# Patient Record
Sex: FEMALE | Race: WHITE | NOT HISPANIC OR LATINO | Employment: FULL TIME | URBAN - METROPOLITAN AREA
[De-identification: names, ages, dates, MRNs, and addresses within clinical notes are randomized per-mention and may not be internally consistent; named-entity substitution may affect disease eponyms.]

---

## 2020-03-16 ENCOUNTER — APPOINTMENT (OUTPATIENT)
Dept: GENERAL RADIOLOGY | Facility: HOSPITAL | Age: 50
End: 2020-03-16

## 2020-03-16 ENCOUNTER — HOSPITAL ENCOUNTER (INPATIENT)
Facility: HOSPITAL | Age: 50
LOS: 1 days | Discharge: HOME | End: 2020-03-18
Attending: EMERGENCY MEDICINE | Admitting: INTERNAL MEDICINE

## 2020-03-16 DIAGNOSIS — E87.6 HYPOKALEMIA: ICD-10-CM

## 2020-03-16 DIAGNOSIS — I48.91 ATRIAL FIBRILLATION WITH RVR (HCC): ICD-10-CM

## 2020-03-16 DIAGNOSIS — I48.91 ATRIAL FIBRILLATION WITH RVR (HCC): Primary | ICD-10-CM

## 2020-03-16 PROBLEM — E03.9 HYPOTHYROIDISM: Status: ACTIVE | Noted: 2020-03-16

## 2020-03-16 PROBLEM — C44.91 BASAL CELL CARCINOMA: Status: ACTIVE | Noted: 2020-03-16

## 2020-03-16 PROBLEM — D25.9 UTERINE LEIOMYOMA: Status: ACTIVE | Noted: 2020-03-16

## 2020-03-16 LAB
ALBUMIN SERPL-MCNC: 4.7 G/DL (ref 3.5–5.2)
ALBUMIN/GLOB SERPL: 1.7 G/DL
ALP SERPL-CCNC: 63 U/L (ref 39–117)
ALT SERPL W P-5'-P-CCNC: 11 U/L (ref 1–33)
ANION GAP SERPL CALCULATED.3IONS-SCNC: 17 MMOL/L (ref 5–15)
AST SERPL-CCNC: 15 U/L (ref 1–32)
BASOPHILS # BLD AUTO: 0.08 10*3/MM3 (ref 0–0.2)
BASOPHILS NFR BLD AUTO: 0.5 % (ref 0–1.5)
BILIRUB SERPL-MCNC: 0.4 MG/DL (ref 0.2–1.2)
BUN BLD-MCNC: 12 MG/DL (ref 6–20)
BUN/CREAT SERPL: 13 (ref 7–25)
CALCIUM SPEC-SCNC: 9.3 MG/DL (ref 8.6–10.5)
CHLORIDE SERPL-SCNC: 100 MMOL/L (ref 98–107)
CO2 SERPL-SCNC: 22 MMOL/L (ref 22–29)
CREAT BLD-MCNC: 0.92 MG/DL (ref 0.57–1)
DEPRECATED RDW RBC AUTO: 38.4 FL (ref 37–54)
EOSINOPHIL # BLD AUTO: 0.25 10*3/MM3 (ref 0–0.4)
EOSINOPHIL NFR BLD AUTO: 1.7 % (ref 0.3–6.2)
ERYTHROCYTE [DISTWIDTH] IN BLOOD BY AUTOMATED COUNT: 12.2 % (ref 12.3–15.4)
GFR SERPL CREATININE-BSD FRML MDRD: 65 ML/MIN/1.73
GLOBULIN UR ELPH-MCNC: 2.8 GM/DL
GLUCOSE BLD-MCNC: 155 MG/DL (ref 65–99)
HCT VFR BLD AUTO: 41.7 % (ref 34–46.6)
HGB BLD-MCNC: 13.8 G/DL (ref 12–15.9)
HOLD SPECIMEN: NORMAL
HOLD SPECIMEN: NORMAL
IMM GRANULOCYTES # BLD AUTO: 0.03 10*3/MM3 (ref 0–0.05)
IMM GRANULOCYTES NFR BLD AUTO: 0.2 % (ref 0–0.5)
LYMPHOCYTES # BLD AUTO: 6.16 10*3/MM3 (ref 0.7–3.1)
LYMPHOCYTES NFR BLD AUTO: 41.5 % (ref 19.6–45.3)
MAGNESIUM SERPL-MCNC: 2 MG/DL (ref 1.6–2.6)
MCH RBC QN AUTO: 28.5 PG (ref 26.6–33)
MCHC RBC AUTO-ENTMCNC: 33.1 G/DL (ref 31.5–35.7)
MCV RBC AUTO: 86 FL (ref 79–97)
MONOCYTES # BLD AUTO: 1.03 10*3/MM3 (ref 0.1–0.9)
MONOCYTES NFR BLD AUTO: 6.9 % (ref 5–12)
NEUTROPHILS # BLD AUTO: 7.28 10*3/MM3 (ref 1.7–7)
NEUTROPHILS NFR BLD AUTO: 49.2 % (ref 42.7–76)
NRBC BLD AUTO-RTO: 0 /100 WBC (ref 0–0.2)
NT-PROBNP SERPL-MCNC: 247.2 PG/ML (ref 5–450)
PLATELET # BLD AUTO: 334 10*3/MM3 (ref 140–450)
PMV BLD AUTO: 10.9 FL (ref 6–12)
POTASSIUM BLD-SCNC: 2.9 MMOL/L (ref 3.5–5.2)
PROT SERPL-MCNC: 7.5 G/DL (ref 6–8.5)
RBC # BLD AUTO: 4.85 10*6/MM3 (ref 3.77–5.28)
SODIUM BLD-SCNC: 139 MMOL/L (ref 136–145)
TROPONIN T SERPL-MCNC: <0.01 NG/ML (ref 0–0.03)
TSH SERPL DL<=0.05 MIU/L-ACNC: 7.79 UIU/ML (ref 0.27–4.2)
WBC NRBC COR # BLD: 14.83 10*3/MM3 (ref 3.4–10.8)
WHOLE BLOOD HOLD SPECIMEN: NORMAL
WHOLE BLOOD HOLD SPECIMEN: NORMAL

## 2020-03-16 PROCEDURE — 99285 EMERGENCY DEPT VISIT HI MDM: CPT

## 2020-03-16 PROCEDURE — 84439 ASSAY OF FREE THYROXINE: CPT | Performed by: NURSE PRACTITIONER

## 2020-03-16 PROCEDURE — 25010000002 PROPOFOL 10 MG/ML EMULSION: Performed by: EMERGENCY MEDICINE

## 2020-03-16 PROCEDURE — 84481 FREE ASSAY (FT-3): CPT | Performed by: NURSE PRACTITIONER

## 2020-03-16 PROCEDURE — 99153 MOD SED SAME PHYS/QHP EA: CPT

## 2020-03-16 PROCEDURE — 80053 COMPREHEN METABOLIC PANEL: CPT | Performed by: EMERGENCY MEDICINE

## 2020-03-16 PROCEDURE — 25010000002 AMIODARONE IN DEXTROSE 5% 360-4.14 MG/200ML-% SOLUTION: Performed by: NURSE PRACTITIONER

## 2020-03-16 PROCEDURE — 99152 MOD SED SAME PHYS/QHP 5/>YRS: CPT

## 2020-03-16 PROCEDURE — 84443 ASSAY THYROID STIM HORMONE: CPT | Performed by: EMERGENCY MEDICINE

## 2020-03-16 PROCEDURE — 83735 ASSAY OF MAGNESIUM: CPT | Performed by: EMERGENCY MEDICINE

## 2020-03-16 PROCEDURE — 71045 X-RAY EXAM CHEST 1 VIEW: CPT

## 2020-03-16 PROCEDURE — 92960 CARDIOVERSION ELECTRIC EXT: CPT

## 2020-03-16 PROCEDURE — 25010000003 POTASSIUM CHLORIDE 10 MEQ/100ML SOLUTION: Performed by: EMERGENCY MEDICINE

## 2020-03-16 PROCEDURE — 83880 ASSAY OF NATRIURETIC PEPTIDE: CPT | Performed by: EMERGENCY MEDICINE

## 2020-03-16 PROCEDURE — 93005 ELECTROCARDIOGRAM TRACING: CPT | Performed by: EMERGENCY MEDICINE

## 2020-03-16 PROCEDURE — 84484 ASSAY OF TROPONIN QUANT: CPT | Performed by: EMERGENCY MEDICINE

## 2020-03-16 PROCEDURE — 85025 COMPLETE CBC W/AUTO DIFF WBC: CPT | Performed by: EMERGENCY MEDICINE

## 2020-03-16 PROCEDURE — 25010000002 AMIODARONE IN DEXTROSE 5% 150-4.21 MG/100ML-% SOLUTION: Performed by: NURSE PRACTITIONER

## 2020-03-16 PROCEDURE — 5A2204Z RESTORATION OF CARDIAC RHYTHM, SINGLE: ICD-10-PCS | Performed by: EMERGENCY MEDICINE

## 2020-03-16 RX ORDER — POTASSIUM CHLORIDE 750 MG/1
20 CAPSULE, EXTENDED RELEASE ORAL ONCE
Status: COMPLETED | OUTPATIENT
Start: 2020-03-16 | End: 2020-03-16

## 2020-03-16 RX ORDER — PROPOFOL 10 MG/ML
40 VIAL (ML) INTRAVENOUS ONCE
Status: DISCONTINUED | OUTPATIENT
Start: 2020-03-16 | End: 2020-03-16

## 2020-03-16 RX ORDER — PROPOFOL 10 MG/ML
VIAL (ML) INTRAVENOUS
Status: COMPLETED | OUTPATIENT
Start: 2020-03-16 | End: 2020-03-16

## 2020-03-16 RX ORDER — MAGNESIUM SULFATE HEPTAHYDRATE 40 MG/ML
2 INJECTION, SOLUTION INTRAVENOUS ONCE
Status: COMPLETED | OUTPATIENT
Start: 2020-03-16 | End: 2020-03-17

## 2020-03-16 RX ORDER — POTASSIUM CHLORIDE 7.45 MG/ML
10 INJECTION INTRAVENOUS ONCE
Status: COMPLETED | OUTPATIENT
Start: 2020-03-16 | End: 2020-03-17

## 2020-03-16 RX ORDER — POTASSIUM CHLORIDE 1.5 G/1.77G
40 POWDER, FOR SOLUTION ORAL AS NEEDED
Status: DISCONTINUED | OUTPATIENT
Start: 2020-03-16 | End: 2020-03-18 | Stop reason: HOSPADM

## 2020-03-16 RX ORDER — DILTIAZEM HCL IN NACL,ISO-OSM 125 MG/125
5-15 PLASTIC BAG, INJECTION (ML) INTRAVENOUS
Status: DISCONTINUED | OUTPATIENT
Start: 2020-03-16 | End: 2020-03-16

## 2020-03-16 RX ORDER — ONDANSETRON 4 MG/1
4 TABLET, FILM COATED ORAL EVERY 6 HOURS PRN
Status: DISCONTINUED | OUTPATIENT
Start: 2020-03-16 | End: 2020-03-18 | Stop reason: HOSPADM

## 2020-03-16 RX ORDER — ALPRAZOLAM 0.25 MG/1
0.25 TABLET ORAL 2 TIMES DAILY PRN
Status: DISCONTINUED | OUTPATIENT
Start: 2020-03-16 | End: 2020-03-18 | Stop reason: HOSPADM

## 2020-03-16 RX ORDER — HEPARIN SODIUM 5000 [USP'U]/ML
5000 INJECTION, SOLUTION INTRAVENOUS; SUBCUTANEOUS EVERY 8 HOURS SCHEDULED
Status: DISCONTINUED | OUTPATIENT
Start: 2020-03-16 | End: 2020-03-17

## 2020-03-16 RX ORDER — SODIUM CHLORIDE 0.9 % (FLUSH) 0.9 %
10 SYRINGE (ML) INJECTION AS NEEDED
Status: DISCONTINUED | OUTPATIENT
Start: 2020-03-16 | End: 2020-03-18 | Stop reason: HOSPADM

## 2020-03-16 RX ORDER — PROPOFOL 10 MG/ML
100 VIAL (ML) INTRAVENOUS ONCE
Status: DISCONTINUED | OUTPATIENT
Start: 2020-03-16 | End: 2020-03-17

## 2020-03-16 RX ORDER — POTASSIUM CHLORIDE 7.45 MG/ML
10 INJECTION INTRAVENOUS
Status: DISCONTINUED | OUTPATIENT
Start: 2020-03-16 | End: 2020-03-18 | Stop reason: HOSPADM

## 2020-03-16 RX ORDER — POTASSIUM CHLORIDE 750 MG/1
40 CAPSULE, EXTENDED RELEASE ORAL AS NEEDED
Status: DISCONTINUED | OUTPATIENT
Start: 2020-03-16 | End: 2020-03-18 | Stop reason: HOSPADM

## 2020-03-16 RX ADMIN — AMIODARONE HYDROCHLORIDE 1 MG/MIN: 1.8 INJECTION, SOLUTION INTRAVENOUS at 23:43

## 2020-03-16 RX ADMIN — PROPOFOL 30 MG: 10 INJECTION, EMULSION INTRAVENOUS at 22:06

## 2020-03-16 RX ADMIN — PROPOFOL 50 MG: 10 INJECTION, EMULSION INTRAVENOUS at 22:04

## 2020-03-16 RX ADMIN — PROPOFOL 50 MG: 10 INJECTION, EMULSION INTRAVENOUS at 22:21

## 2020-03-16 RX ADMIN — POTASSIUM CHLORIDE 20 MEQ: 10 CAPSULE, COATED, EXTENDED RELEASE ORAL at 23:34

## 2020-03-16 RX ADMIN — SODIUM CHLORIDE 2000 ML: 9 INJECTION, SOLUTION INTRAVENOUS at 22:55

## 2020-03-16 RX ADMIN — PROPOFOL 50 MG: 10 INJECTION, EMULSION INTRAVENOUS at 22:22

## 2020-03-16 RX ADMIN — AMIODARONE HYDROCHLORIDE 150 MG: 1.5 INJECTION, SOLUTION INTRAVENOUS at 23:30

## 2020-03-16 RX ADMIN — PROPOFOL 50 MG: 10 INJECTION, EMULSION INTRAVENOUS at 22:02

## 2020-03-16 RX ADMIN — Medication 5 MG/HR: at 23:01

## 2020-03-16 RX ADMIN — POTASSIUM CHLORIDE 10 MEQ: 7.46 INJECTION, SOLUTION INTRAVENOUS at 23:38

## 2020-03-16 RX ADMIN — PROPOFOL 20 MG: 10 INJECTION, EMULSION INTRAVENOUS at 22:07

## 2020-03-16 RX ADMIN — PROPOFOL 20 MG: 10 INJECTION, EMULSION INTRAVENOUS at 22:23

## 2020-03-17 ENCOUNTER — APPOINTMENT (OUTPATIENT)
Dept: CARDIOLOGY | Facility: HOSPITAL | Age: 50
End: 2020-03-17

## 2020-03-17 PROBLEM — I47.29 NONSUSTAINED VENTRICULAR TACHYCARDIA (HCC): Status: ACTIVE | Noted: 2020-03-17

## 2020-03-17 LAB
AMPHET+METHAMPHET UR QL: NEGATIVE
AMPHETAMINES UR QL: NEGATIVE
ANION GAP SERPL CALCULATED.3IONS-SCNC: 11 MMOL/L (ref 5–15)
B-HCG UR QL: NEGATIVE
BARBITURATES UR QL SCN: NEGATIVE
BENZODIAZ UR QL SCN: NEGATIVE
BH CV ECHO MEAS - AO MAX PG (FULL): 4.1 MMHG
BH CV ECHO MEAS - AO MAX PG: 7.4 MMHG
BH CV ECHO MEAS - AO MEAN PG (FULL): 1 MMHG
BH CV ECHO MEAS - AO MEAN PG: 3 MMHG
BH CV ECHO MEAS - AO ROOT AREA (BSA CORRECTED): 1.4
BH CV ECHO MEAS - AO ROOT AREA: 5.7 CM^2
BH CV ECHO MEAS - AO ROOT DIAM: 2.7 CM
BH CV ECHO MEAS - AO V2 MAX: 136 CM/SEC
BH CV ECHO MEAS - AO V2 MEAN: 82.4 CM/SEC
BH CV ECHO MEAS - AO V2 VTI: 25.8 CM
BH CV ECHO MEAS - AVA(I,A): 1.7 CM^2
BH CV ECHO MEAS - AVA(I,D): 1.7 CM^2
BH CV ECHO MEAS - AVA(V,A): 1.5 CM^2
BH CV ECHO MEAS - AVA(V,D): 1.5 CM^2
BH CV ECHO MEAS - BSA(HAYCOCK): 2 M^2
BH CV ECHO MEAS - BSA: 2 M^2
BH CV ECHO MEAS - BZI_BMI: 25.1 KILOGRAMS/M^2
BH CV ECHO MEAS - BZI_METRIC_HEIGHT: 177.8 CM
BH CV ECHO MEAS - BZI_METRIC_WEIGHT: 79.4 KG
BH CV ECHO MEAS - EDV(CUBED): 82.3 ML
BH CV ECHO MEAS - EDV(MOD-SP2): 47 ML
BH CV ECHO MEAS - EDV(MOD-SP4): 51 ML
BH CV ECHO MEAS - EDV(TEICH): 85.4 ML
BH CV ECHO MEAS - EF(CUBED): 71.9 %
BH CV ECHO MEAS - EF(MOD-BP): 62 %
BH CV ECHO MEAS - EF(MOD-SP2): 61.7 %
BH CV ECHO MEAS - EF(MOD-SP4): 60.8 %
BH CV ECHO MEAS - EF(TEICH): 63.8 %
BH CV ECHO MEAS - ESV(CUBED): 23.1 ML
BH CV ECHO MEAS - ESV(MOD-SP2): 18 ML
BH CV ECHO MEAS - ESV(MOD-SP4): 20 ML
BH CV ECHO MEAS - ESV(TEICH): 30.9 ML
BH CV ECHO MEAS - FS: 34.5 %
BH CV ECHO MEAS - IVS/LVPW: 1.1
BH CV ECHO MEAS - IVSD: 0.79 CM
BH CV ECHO MEAS - LA DIMENSION: 2.8 CM
BH CV ECHO MEAS - LA/AO: 1
BH CV ECHO MEAS - LAD MAJOR: 4.6 CM
BH CV ECHO MEAS - LAT PEAK E' VEL: 11.8 CM/SEC
BH CV ECHO MEAS - LATERAL E/E' RATIO: 8.2
BH CV ECHO MEAS - LV DIASTOLIC VOL/BSA (35-75): 25.9 ML/M^2
BH CV ECHO MEAS - LV MASS(C)D: 99 GRAMS
BH CV ECHO MEAS - LV MASS(C)DI: 50.2 GRAMS/M^2
BH CV ECHO MEAS - LV MAX PG: 3.3 MMHG
BH CV ECHO MEAS - LV MEAN PG: 2 MMHG
BH CV ECHO MEAS - LV SYSTOLIC VOL/BSA (12-30): 10.1 ML/M^2
BH CV ECHO MEAS - LV V1 MAX: 91 CM/SEC
BH CV ECHO MEAS - LV V1 MEAN: 58.1 CM/SEC
BH CV ECHO MEAS - LV V1 VTI: 18.8 CM
BH CV ECHO MEAS - LVIDD: 4.4 CM
BH CV ECHO MEAS - LVIDS: 2.9 CM
BH CV ECHO MEAS - LVLD AP2: 7 CM
BH CV ECHO MEAS - LVLD AP4: 6.6 CM
BH CV ECHO MEAS - LVLS AP2: 5.7 CM
BH CV ECHO MEAS - LVLS AP4: 5.5 CM
BH CV ECHO MEAS - LVOT AREA (M): 2.3 CM^2
BH CV ECHO MEAS - LVOT AREA: 2.3 CM^2
BH CV ECHO MEAS - LVOT DIAM: 1.7 CM
BH CV ECHO MEAS - LVPWD: 0.71 CM
BH CV ECHO MEAS - MED PEAK E' VEL: 9.5 CM/SEC
BH CV ECHO MEAS - MEDIAL E/E' RATIO: 10.2
BH CV ECHO MEAS - MV A MAX VEL: 53.3 CM/SEC
BH CV ECHO MEAS - MV DEC SLOPE: 294 CM/SEC^2
BH CV ECHO MEAS - MV DEC TIME: 0.23 SEC
BH CV ECHO MEAS - MV E MAX VEL: 96.7 CM/SEC
BH CV ECHO MEAS - MV E/A: 1.8
BH CV ECHO MEAS - MV P1/2T MAX VEL: 95.4 CM/SEC
BH CV ECHO MEAS - MV P1/2T: 95 MSEC
BH CV ECHO MEAS - MVA P1/2T LCG: 2.3 CM^2
BH CV ECHO MEAS - MVA(P1/2T): 2.3 CM^2
BH CV ECHO MEAS - SI(AO): 74.9 ML/M^2
BH CV ECHO MEAS - SI(CUBED): 30 ML/M^2
BH CV ECHO MEAS - SI(LVOT): 21.6 ML/M^2
BH CV ECHO MEAS - SI(MOD-SP2): 14.7 ML/M^2
BH CV ECHO MEAS - SI(MOD-SP4): 15.7 ML/M^2
BH CV ECHO MEAS - SI(TEICH): 27.6 ML/M^2
BH CV ECHO MEAS - SV(AO): 147.7 ML
BH CV ECHO MEAS - SV(CUBED): 59.2 ML
BH CV ECHO MEAS - SV(LVOT): 42.7 ML
BH CV ECHO MEAS - SV(MOD-SP2): 29 ML
BH CV ECHO MEAS - SV(MOD-SP4): 31 ML
BH CV ECHO MEAS - SV(TEICH): 54.5 ML
BH CV ECHO MEAS - TR MAX PG: 4 MMHG
BH CV ECHO MEAS - TR MAX VEL: 93.9 CM/SEC
BH CV ECHO MEASUREMENTS AVERAGE E/E' RATIO: 9.08
BILIRUB UR QL STRIP: NEGATIVE
BUN BLD-MCNC: 11 MG/DL (ref 6–20)
BUN/CREAT SERPL: 15.3 (ref 7–25)
BUPRENORPHINE SERPL-MCNC: NEGATIVE NG/ML
CA-I SERPL ISE-MCNC: 1.21 MMOL/L (ref 1.12–1.32)
CALCIUM SPEC-SCNC: 8 MG/DL (ref 8.6–10.5)
CANNABINOIDS SERPL QL: NEGATIVE
CHLORIDE SERPL-SCNC: 107 MMOL/L (ref 98–107)
CLARITY UR: CLEAR
CO2 SERPL-SCNC: 20 MMOL/L (ref 22–29)
COCAINE UR QL: NEGATIVE
COLOR UR: YELLOW
CREAT BLD-MCNC: 0.72 MG/DL (ref 0.57–1)
DEPRECATED RDW RBC AUTO: 38.7 FL (ref 37–54)
ERYTHROCYTE [DISTWIDTH] IN BLOOD BY AUTOMATED COUNT: 12.3 % (ref 12.3–15.4)
GFR SERPL CREATININE-BSD FRML MDRD: 86 ML/MIN/1.73
GLUCOSE BLD-MCNC: 122 MG/DL (ref 65–99)
GLUCOSE UR STRIP-MCNC: NEGATIVE MG/DL
HCT VFR BLD AUTO: 37.9 % (ref 34–46.6)
HGB BLD-MCNC: 12.1 G/DL (ref 12–15.9)
HGB UR QL STRIP.AUTO: NEGATIVE
KETONES UR QL STRIP: NEGATIVE
LEFT ATRIUM VOLUME INDEX: 27.4 ML/M^2
LEFT ATRIUM VOLUME: 54 ML
LEUKOCYTE ESTERASE UR QL STRIP.AUTO: NEGATIVE
LV EF 2D ECHO EST: 60 %
MAGNESIUM SERPL-MCNC: 2.2 MG/DL (ref 1.6–2.6)
MCH RBC QN AUTO: 27.5 PG (ref 26.6–33)
MCHC RBC AUTO-ENTMCNC: 31.9 G/DL (ref 31.5–35.7)
MCV RBC AUTO: 86.1 FL (ref 79–97)
METHADONE UR QL SCN: NEGATIVE
NITRITE UR QL STRIP: NEGATIVE
OPIATES UR QL: NEGATIVE
OXYCODONE UR QL SCN: NEGATIVE
PCP UR QL SCN: NEGATIVE
PH UR STRIP.AUTO: 6.5 [PH] (ref 5–8)
PHOSPHATE SERPL-MCNC: 2.8 MG/DL (ref 2.5–4.5)
PLATELET # BLD AUTO: 266 10*3/MM3 (ref 140–450)
PMV BLD AUTO: 10.5 FL (ref 6–12)
POTASSIUM BLD-SCNC: 4.4 MMOL/L (ref 3.5–5.2)
PROPOXYPH UR QL: NEGATIVE
PROT UR QL STRIP: NEGATIVE
RBC # BLD AUTO: 4.4 10*6/MM3 (ref 3.77–5.28)
SODIUM BLD-SCNC: 138 MMOL/L (ref 136–145)
SP GR UR STRIP: 1.01 (ref 1–1.03)
T3FREE SERPL-MCNC: 4.02 PG/ML (ref 2–4.4)
T4 FREE SERPL-MCNC: 1.29 NG/DL (ref 0.93–1.7)
TRICYCLICS UR QL SCN: NEGATIVE
TROPONIN T SERPL-MCNC: <0.01 NG/ML (ref 0–0.03)
UROBILINOGEN UR QL STRIP: NORMAL
WBC NRBC COR # BLD: 12.53 10*3/MM3 (ref 3.4–10.8)

## 2020-03-17 PROCEDURE — 93005 ELECTROCARDIOGRAM TRACING: CPT | Performed by: NURSE PRACTITIONER

## 2020-03-17 PROCEDURE — 93306 TTE W/DOPPLER COMPLETE: CPT | Performed by: INTERNAL MEDICINE

## 2020-03-17 PROCEDURE — 93306 TTE W/DOPPLER COMPLETE: CPT

## 2020-03-17 PROCEDURE — 83735 ASSAY OF MAGNESIUM: CPT | Performed by: NURSE PRACTITIONER

## 2020-03-17 PROCEDURE — 93010 ELECTROCARDIOGRAM REPORT: CPT | Performed by: INTERNAL MEDICINE

## 2020-03-17 PROCEDURE — 99254 IP/OBS CNSLTJ NEW/EST MOD 60: CPT | Performed by: INTERNAL MEDICINE

## 2020-03-17 PROCEDURE — 25010000002 AMIODARONE IN DEXTROSE 5% 360-4.14 MG/200ML-% SOLUTION: Performed by: NURSE PRACTITIONER

## 2020-03-17 PROCEDURE — 84484 ASSAY OF TROPONIN QUANT: CPT | Performed by: NURSE PRACTITIONER

## 2020-03-17 PROCEDURE — 25010000002 HEPARIN (PORCINE) PER 1000 UNITS: Performed by: NURSE PRACTITIONER

## 2020-03-17 PROCEDURE — 80048 BASIC METABOLIC PNL TOTAL CA: CPT | Performed by: NURSE PRACTITIONER

## 2020-03-17 PROCEDURE — 81003 URINALYSIS AUTO W/O SCOPE: CPT | Performed by: INTERNAL MEDICINE

## 2020-03-17 PROCEDURE — 25010000002 MAGNESIUM SULFATE 2 GM/50ML SOLUTION: Performed by: EMERGENCY MEDICINE

## 2020-03-17 PROCEDURE — 84100 ASSAY OF PHOSPHORUS: CPT | Performed by: NURSE PRACTITIONER

## 2020-03-17 PROCEDURE — 81025 URINE PREGNANCY TEST: CPT | Performed by: INTERNAL MEDICINE

## 2020-03-17 PROCEDURE — 85027 COMPLETE CBC AUTOMATED: CPT | Performed by: NURSE PRACTITIONER

## 2020-03-17 PROCEDURE — 99223 1ST HOSP IP/OBS HIGH 75: CPT | Performed by: INTERNAL MEDICINE

## 2020-03-17 PROCEDURE — 82330 ASSAY OF CALCIUM: CPT | Performed by: NURSE PRACTITIONER

## 2020-03-17 PROCEDURE — 80306 DRUG TEST PRSMV INSTRMNT: CPT | Performed by: NURSE PRACTITIONER

## 2020-03-17 RX ORDER — FLECAINIDE ACETATE 50 MG/1
50 TABLET ORAL EVERY 12 HOURS SCHEDULED
Status: DISCONTINUED | OUTPATIENT
Start: 2020-03-17 | End: 2020-03-18

## 2020-03-17 RX ORDER — DIPHENHYDRAMINE HYDROCHLORIDE 25 MG/1
TABLET ORAL
COMMUNITY
End: 2020-05-13

## 2020-03-17 RX ORDER — CHOLECALCIFEROL (VITAMIN D3) 125 MCG
5 CAPSULE ORAL NIGHTLY PRN
Status: DISCONTINUED | OUTPATIENT
Start: 2020-03-17 | End: 2020-03-18 | Stop reason: HOSPADM

## 2020-03-17 RX ORDER — ACETAMINOPHEN 325 MG/1
650 TABLET ORAL EVERY 6 HOURS PRN
Status: DISCONTINUED | OUTPATIENT
Start: 2020-03-17 | End: 2020-03-18 | Stop reason: HOSPADM

## 2020-03-17 RX ORDER — LEVOTHYROXINE AND LIOTHYRONINE 38; 9 UG/1; UG/1
60 TABLET ORAL DAILY
COMMUNITY
End: 2020-03-18 | Stop reason: HOSPADM

## 2020-03-17 RX ADMIN — APIXABAN 5 MG: 5 TABLET, FILM COATED ORAL at 11:07

## 2020-03-17 RX ADMIN — POTASSIUM CHLORIDE 40 MEQ: 10 CAPSULE, COATED, EXTENDED RELEASE ORAL at 01:01

## 2020-03-17 RX ADMIN — APIXABAN 5 MG: 5 TABLET, FILM COATED ORAL at 20:28

## 2020-03-17 RX ADMIN — AMIODARONE HYDROCHLORIDE 0.5 MG/MIN: 1.8 INJECTION, SOLUTION INTRAVENOUS at 11:40

## 2020-03-17 RX ADMIN — METOPROLOL TARTRATE 25 MG: 25 TABLET, FILM COATED ORAL at 20:28

## 2020-03-17 RX ADMIN — ACETAMINOPHEN 650 MG: 325 TABLET, FILM COATED ORAL at 20:27

## 2020-03-17 RX ADMIN — MAGNESIUM SULFATE 2 G: 2 INJECTION INTRAVENOUS at 00:41

## 2020-03-17 RX ADMIN — HEPARIN SODIUM 5000 UNITS: 5000 INJECTION, SOLUTION INTRAVENOUS; SUBCUTANEOUS at 06:13

## 2020-03-17 RX ADMIN — METOPROLOL TARTRATE 25 MG: 25 TABLET, FILM COATED ORAL at 11:07

## 2020-03-17 RX ADMIN — ALPRAZOLAM 0.25 MG: 0.25 TABLET ORAL at 00:41

## 2020-03-17 RX ADMIN — AMIODARONE HYDROCHLORIDE 1 MG/MIN: 1.8 INJECTION, SOLUTION INTRAVENOUS at 02:40

## 2020-03-17 RX ADMIN — HEPARIN SODIUM 5000 UNITS: 5000 INJECTION, SOLUTION INTRAVENOUS; SUBCUTANEOUS at 00:41

## 2020-03-17 RX ADMIN — ALPRAZOLAM 0.25 MG: 0.25 TABLET ORAL at 20:31

## 2020-03-17 RX ADMIN — SODIUM CHLORIDE, PRESERVATIVE FREE 10 ML: 5 INJECTION INTRAVENOUS at 20:26

## 2020-03-17 NOTE — H&P
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 This evening ~2230 she noticed palpitations that was associated with throat tightness, dyspnea on exertion, bilateral hand numbness, and stomach tingling. She has had this in the past with most recent episode in November that sustained for ~3-4 days and resolved spontaneously.     She states that she has recently been on some hormone therapy, including thyroid replacement, to aid in weight loss. There is a familial history of AF in both her parents and mother underwent ablation this past fall. She denies pertinent medical history although she does not see a doctor on a regular basis.     On ED arrival she was noted to be in AF RVR with rates 200s. ECV X2 performed and each time she converted to NSR, but AF recurred within ~5 minutes with some runs of wide-complex beats. Cardizem infusion initiated and BP has remained satisfactory. Pertinent labs reveal negative troponin, K 2.9, Mg 2, Ca 9.3, and WBC 14.8. Potassium and magnesium replacements given in the ED. TSH is elevated at 7.79 previously 0.984 on 1/9/20 and she has reportedly been on replacement therapy. CXR negative for acute process.     Upon arrival to the intensive care unit she remains in atrial fibrillation however states that her palpitations are much better.  She is no longer short of breath.  Sensation is normal in her upper extremities.  She is anxious but without distress. She is requesting something for anxiety. There is family present at bedside.     Problem List, Surgical History, Family, Social History, and ROS      "    Patient Active Problem List    Diagnosis   • *Atrial fibrillation with RVR  [I48.91]   • Nonsustained ventricular tachycardia versus aberrantly conducted atrial fibrillation [I47.2]   • Hypothyroidism [E03.9]   • Hypokalemia [E87.6]   • Uterine leiomyoma [D25.9]   • Basal cell carcinoma [C44.91]     No past surgical history on file.    Allergies   Allergen Reactions   • Penicillins Hives   • Percocet [Oxycodone-Acetaminophen] Rash     No current facility-administered medications on file prior to encounter.      No current outpatient medications on file prior to encounter.     MEDICATION LIST AND ALLERGIES REVIEWED.    History reviewed. No pertinent family history.  Social History     Tobacco Use   • Smoking status: Not on file   Substance Use Topics   • Alcohol use: Not on file   • Drug use: Not on file      FAMILY AND SOCIAL HISTORY REVIEWED.    Review of Systems   Constitutional: Positive for diaphoresis.   Respiratory: Positive for chest tightness and shortness of breath.    Cardiovascular: Positive for palpitations.   Neurological: Positive for numbness.   Psychiatric/Behavioral: The patient is nervous/anxious.    All other systems reviewed and are negative.    ALL OTHER SYSTEMS REVIEWED AND ARE NEGATIVE.    Physical Exam and Clinical Information   /81   Pulse (!) 137   Temp 98.1 °F (36.7 °C) (Oral)   Resp 18   Ht 177.8 cm (70\")   Wt 76.7 kg (169 lb)   LMP  (LMP Unknown)   SpO2 100%   BMI 24.25 kg/m²   Physical Exam   Constitutional: She is oriented to person, place, and time. She appears well-developed and well-nourished. No distress.   HENT:   Head: Normocephalic and atraumatic.   Mouth/Throat: Oropharynx is clear and moist.   Eyes: Pupils are equal, round, and reactive to light. EOM are normal.   Neck: Normal range of motion. Neck supple. No JVD present. No tracheal deviation present. No thyromegaly present.   No carotid bruits   Cardiovascular: Normal heart sounds.   No murmur " heard.  Irregularly irregular tachycardia.  This appears to be atrial fibrillation on telemetry.   Pulmonary/Chest: Effort normal and breath sounds normal. No respiratory distress. She has no wheezes. She has no rales.   Abdominal: Soft. Bowel sounds are normal. She exhibits no distension. There is no tenderness.   Musculoskeletal: Normal range of motion. She exhibits no edema.   Lymphadenopathy:     She has no cervical adenopathy.   Neurological: She is alert and oriented to person, place, and time.   Skin: Skin is warm. Capillary refill takes less than 2 seconds. She is not diaphoretic. No erythema.   Psychiatric: She has a normal mood and affect. Her behavior is normal. Judgment and thought content normal.   Vitals reviewed.      Results from last 7 days   Lab Units 03/16/20  2200   WBC 10*3/mm3 14.83*   HEMOGLOBIN g/dL 13.8   PLATELETS 10*3/mm3 334     Results from last 7 days   Lab Units 03/16/20  2200   SODIUM mmol/L 139   POTASSIUM mmol/L 2.9*   CO2 mmol/L 22.0   BUN mg/dL 12   CREATININE mg/dL 0.92   MAGNESIUM mg/dL 2.0   GLUCOSE mg/dL 155*     Estimated Creatinine Clearance: 90.5 mL/min (by C-G formula based on SCr of 0.92 mg/dL).          No results found for: LACTATE     Images: 3/16/20    I reviewed the patient's results and images.     Impression     Atrial fibrillation with RVR     Hypothyroidism    Hypokalemia    Nonsustained ventricular tachycardia versus aberrantly conducted atrial fibrillation    Plan/Recommendations     1. Admit to ICU for close monitoring  2. Switch to Amiodarone drip from Cardizem.  3. Trend troponin   4. Cardiology consult in AM   5. Echo in AM   6. Defer systemic anticoagulation at this time  7. Thyroid studies  8. UDS   9. Xanax as needed for anxiety   10. Heparin VTE PPX   11. Cardiac diet     Maureen Sánchez, APRN, AGACNP-BC, FNP-BC   Pulmonary and Critical Care     I have been able to interview the patient and her family myself.  I conducted my own physical examination.  I  discussed the case with Ms. Princess and Dr. Oliva.  I have changed the above documentation to reflect my findings including the HPI, physical examination, and impression/plan.  In brief, this is a very nice woman with a prior history of probable atrial fibrillation as early as 2016 who presented this evening with palpitations and rapid atrial fibrillation as well as several seconds of wide-complex tachycardia which she felt the most symptomatic with.  She underwent to failed cardioversions this evening in the emergency department.  I was consulted for transfer to the intensive care unit for further management.  She has since been started on an amiodarone drip and her rate is much better controlled however she does remain in atrial fibrillation.  Her symptoms have improved greatly.  We will go ahead and check a UDS as well as a urinalysis.  I suspect that some of these weight loss supplements could be contributing to her atrial fibrillation and worsened palpitations though certainly her family history as well as recent stressors could be contributing.    She will be monitored closely in the intensive care unit on the amiodarone drip and we will continue with her stratification and work-up through the morning.  I will involve cardiology in her care tomorrow morning.  She remains at high risk for worsening secondary to hemodynamic instability.    Juan Garibay MD, [facility]  Pulmonary and Critical Care Medicine         CC: Rut Khanna MD

## 2020-03-17 NOTE — PAYOR COMM NOTE
"Sumi Elmore (49 y.o. Female)     Date of Birth Social Security Number Address Home Phone MRN    1970  [address] [place] [address] [phone] [id number]    Orthodox Marital Status          Evangelical        Admission Date Admission Type Admitting Provider Attending Provider Department, Room/Bed    3/16/20 Emergency Juan Garibay MD McIntosh, Matthew M, MD [facility] 2B ICU, N239/1    Discharge Date Discharge Disposition Discharge Destination                       Attending Provider:  Juan Garibay MD    Allergies:  Penicillins, Percocet [Oxycodone-acetaminophen]    Isolation:  None   Infection:  None   Code Status:  CPR    Ht:  177.8 cm (70\")   Wt:  79.7 kg (175 lb 11.3 oz)    Admission Cmt:  None   Principal Problem:  Atrial fibrillation with RVR  [I48.91]                 Active Insurance as of 3/16/2020     Primary Coverage     Payor Plan Insurance Group Employer/Plan Group    ANTHEM BLUE CROSS ANTHEM BLUE CROSS BLUE [facility] PPO [id number]     Payor Plan Address Payor Plan Phone Number Payor Plan Fax Number Effective Dates    [address] [phone]  1/1/2019 - None Entered    [facility] [address]       Subscriber Name Subscriber Birth Date Member ID       SUMI ELMORE 1970 [id number]                 Emergency Contacts      (Rel.) Home Phone Work Phone Mobile Phone    MAKAYLA ELMORE (Spouse) [phone] -- [phone]               History & Physical      Juan Garibay MD at 03/17/20 0103            Intensive Care Admission Note     Atrial fibrillation with RVR (CMS/HCC)    History of Present Illness     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     The patient states that she has recently been under increased stress as she began a bridge to bachelor of nursing and has been very nervous with the recent " coronavirus concerns. This evening ~2230 she noticed palpitations that was associated with throat tightness, dyspnea on exertion, bilateral hand numbness, and stomach tingling. She has had this in the past with most recent episode in November that sustained for ~3-4 days and resolved spontaneously.     She states that she has recently been on some hormone therapy, including thyroid replacement, to aid in weight loss. There is a familial history of AF in both her parents and mother underwent ablation this past fall. She denies pertinent medical history although she does not see a doctor on a regular basis.     On ED arrival she was noted to be in AF RVR with rates 200s. ECV X2 performed and each time she converted to NSR, but AF recurred within ~5 minutes with some runs of wide-complex beats. Cardizem infusion initiated and BP has remained satisfactory. Pertinent labs reveal negative troponin, K 2.9, Mg 2, Ca 9.3, and WBC 14.8. Potassium and magnesium replacements given in the ED. TSH is elevated at 7.79 previously 0.984 on 1/9/20 and she has reportedly been on replacement therapy. CXR negative for acute process.     Upon arrival to the intensive care unit she remains in atrial fibrillation however states that her palpitations are much better.  She is no longer short of breath.  Sensation is normal in her upper extremities.  She is anxious but without distress. She is requesting something for anxiety. There is family present at bedside.     Problem List, Surgical History, Family, Social History, and ROS     Patient Active Problem List    Diagnosis   • *Atrial fibrillation with RVR  [I48.91]   • Nonsustained ventricular tachycardia versus aberrantly conducted atrial fibrillation [I47.2]   • Hypothyroidism [E03.9]   • Hypokalemia [E87.6]   • Uterine leiomyoma [D25.9]   • Basal cell carcinoma [C44.91]     No past surgical history on file.    Allergies   Allergen Reactions   • Penicillins Hives   • Percocet  "[Oxycodone-Acetaminophen] Rash     No current facility-administered medications on file prior to encounter.      No current outpatient medications on file prior to encounter.     MEDICATION LIST AND ALLERGIES REVIEWED.    History reviewed. No pertinent family history.  Social History     Tobacco Use   • Smoking status: Not on file   Substance Use Topics   • Alcohol use: Not on file   • Drug use: Not on file      FAMILY AND SOCIAL HISTORY REVIEWED.    Review of Systems   Constitutional: Positive for diaphoresis.   Respiratory: Positive for chest tightness and shortness of breath.    Cardiovascular: Positive for palpitations.   Neurological: Positive for numbness.   Psychiatric/Behavioral: The patient is nervous/anxious.    All other systems reviewed and are negative.    ALL OTHER SYSTEMS REVIEWED AND ARE NEGATIVE.    Physical Exam and Clinical Information   /81   Pulse (!) 137   Temp 98.1 °F (36.7 °C) (Oral)   Resp 18   Ht 177.8 cm (70\")   Wt 76.7 kg (169 lb)   LMP  (LMP Unknown)   SpO2 100%   BMI 24.25 kg/m²    Physical Exam   Constitutional: She is oriented to person, place, and time. She appears well-developed and well-nourished. No distress.   HENT:   Head: Normocephalic and atraumatic.   Mouth/Throat: Oropharynx is clear and moist.   Eyes: Pupils are equal, round, and reactive to light. EOM are normal.   Neck: Normal range of motion. Neck supple. No JVD present. No tracheal deviation present. No thyromegaly present.   No carotid bruits   Cardiovascular: Normal heart sounds.   No murmur heard.  Irregularly irregular tachycardia.  This appears to be atrial fibrillation on telemetry.   Pulmonary/Chest: Effort normal and breath sounds normal. No respiratory distress. She has no wheezes. She has no rales.   Abdominal: Soft. Bowel sounds are normal. She exhibits no distension. There is no tenderness.   Musculoskeletal: Normal range of motion. She exhibits no edema.   Lymphadenopathy:     She has no " cervical adenopathy.   Neurological: She is alert and oriented to person, place, and time.   Skin: Skin is warm. Capillary refill takes less than 2 seconds. She is not diaphoretic. No erythema.   Psychiatric: She has a normal mood and affect. Her behavior is normal. Judgment and thought content normal.   Vitals reviewed.      Results from last 7 days   Lab Units 03/16/20  2200   WBC 10*3/mm3 14.83*   HEMOGLOBIN g/dL 13.8   PLATELETS 10*3/mm3 334     Results from last 7 days   Lab Units 03/16/20  2200   SODIUM mmol/L 139   POTASSIUM mmol/L 2.9*   CO2 mmol/L 22.0   BUN mg/dL 12   CREATININE mg/dL 0.92   MAGNESIUM mg/dL 2.0   GLUCOSE mg/dL 155*     Estimated Creatinine Clearance: 90.5 mL/min (by C-G formula based on SCr of 0.92 mg/dL).          No results found for: LACTATE     Images: 3/16/20    I reviewed the patient's results and images.     Impression     Atrial fibrillation with RVR     Hypothyroidism    Hypokalemia    Nonsustained ventricular tachycardia versus aberrantly conducted atrial fibrillation    Plan/Recommendations     1. Admit to ICU for close monitoring  2. Switch to Amiodarone drip from Cardizem.  3. Trend troponin   4. Cardiology consult in AM   5. Echo in AM   6. Defer systemic anticoagulation at this time  7. Thyroid studies  8. UDS   9. Xanax as needed for anxiety   10. Heparin VTE PPX   11. Cardiac diet     Maureen Sánchez, APRN, AGACNP-BC, FNP-BC   Pulmonary and Critical Care     I have been able to interview the patient and her family myself.  I conducted my own physical examination.  I discussed the case with Ms. Sánchez and Dr. Oliva.  I have changed the above documentation to reflect my findings including the HPI, physical examination, and impression/plan.  In brief, this is a very nice woman with a prior history of probable atrial fibrillation as early as 2016 who presented this evening with palpitations and rapid atrial fibrillation as well as several seconds of wide-complex tachycardia  "which she felt the most symptomatic with.  She underwent to failed cardioversions this evening in the emergency department.  I was consulted for transfer to the intensive care unit for further management.  She has since been started on an amiodarone drip and her rate is much better controlled however she does remain in atrial fibrillation.  Her symptoms have improved greatly.  We will go ahead and check a UDS as well as a urinalysis.  I suspect that some of these weight loss supplements could be contributing to her atrial fibrillation and worsened palpitations though certainly her family history as well as recent stressors could be contributing.    She will be monitored closely in the intensive care unit on the amiodarone drip and we will continue with her stratification and work-up through the morning.  I will involve cardiology in her care tomorrow morning.  She remains at high risk for worsening secondary to hemodynamic instability.    Juan Garibay MD, [facility]  Pulmonary and Critical Care Medicine         CC: Rut Khanna MD    Electronically signed by Juan Garibay MD at 03/17/20 0111          Emergency Department Notes      Yair Oilva MD at 03/16/20 2210      Procedure Orders    1. Critical Care [[id number]] ordered by Yair Oliva MD at 03/17/20 0028     2. Procedural Sedation [[id number]] ordered by Yair Oliva MD at 03/17/20 0024     3. Procedural Sedation [[id number]] ordered by Yair Oliva MD at 03/17/20 0000     4. Electrical Cardioversion [[id number]] ordered by Yair Oliva MD at 03/16/20 2215                Subjective   49-year-old female presents for evaluation of \"palpitations.\"  Of note, the patient is otherwise healthy.  She states that over the past few year she has had few episodes of brief palpitations but has never formally been diagnosed with any arrhythmias.  Tonight, approximately 20 minutes before coming to the emergency department the " patient was at home in her kitchen when she had acute onset of rapid heart rate and palpitations.  Upon EMS arrival, the patient was markedly tachycardic with an irregularly irregular rhythm consistent with atrial fibrillation with rapid ventricular response.  She was subsequently brought to the ED.  The patient endorses accompanying shortness of breath and dizziness.  She is unsure as to what may have triggered her symptoms.  No family history of sudden cardiac death.      History provided by:  Patient and EMS personnel  Palpitations   Palpitations quality:  Irregular  Onset quality:  Sudden  Timing:  Constant  Progression:  Unchanged  Chronicity:  New  Associated symptoms: dizziness and shortness of breath    Associated symptoms: no cough        Review of Systems   Constitutional: Negative for fever.   Respiratory: Positive for chest tightness and shortness of breath. Negative for cough.    Cardiovascular: Positive for palpitations.   Neurological: Positive for dizziness.   All other systems reviewed and are negative.      No past medical history on file.    Allergies   Allergen Reactions   • Penicillins Hives   • Percocet [Oxycodone-Acetaminophen] Rash       No past surgical history on file.    No family history on file.    Social History     Socioeconomic History   • Marital status:      Spouse name: Not on file   • Number of children: Not on file   • Years of education: Not on file   • Highest education level: Not on file         Objective   Physical Exam   Constitutional: She is oriented to person, place, and time. She appears well-developed and well-nourished.   Nontoxic-appearing female, anxious   HENT:   Head: Normocephalic and atraumatic.   Mouth/Throat: Oropharynx is clear and moist.   Eyes: Pupils are equal, round, and reactive to light. EOM are normal.   Neck: Normal range of motion. Neck supple.   Cardiovascular: Normal heart sounds. Exam reveals no gallop and no friction rub.   No murmur  heard.  Irregularly irregular tachycardia   Pulmonary/Chest: Effort normal and breath sounds normal. No respiratory distress. She has no wheezes. She has no rales.   Abdominal: Soft. Bowel sounds are normal. She exhibits no distension and no mass. There is no tenderness. There is no rebound and no guarding.   Musculoskeletal: Normal range of motion.   Neurological: She is alert and oriented to person, place, and time.   Skin: Skin is warm and dry. No rash noted. She is not diaphoretic. No erythema.   Psychiatric: Judgment and thought content normal.   Anxious   Nursing note and vitals reviewed.      Electrical Cardioversion  Date/Time: 3/16/2020 10:01 PM  Performed by: Yair Oliva MD  Authorized by: Yair Oliva MD     Consent:     Consent obtained:  Verbal and written    Consent given by:  Patient    Risks discussed:  Pain, cutaneous burn, death and induced arrhythmia  Pre-procedure details:     Cardioversion basis:  Elective    Rhythm:  Atrial fibrillation    Electrode placement:  Anterior-posterior  Attempt one:     Cardioversion mode:  Synchronous    Waveform:  Biphasic    Shock (Joules):  150    Shock outcome:  Conversion to normal sinus rhythm (transiently)  Attempt two:     Cardioversion mode:  Synchronous    Waveform:  Biphasic    Shock (Joules):  150    Shock outcome:  Conversion to normal sinus rhythm (transiently)  Post-procedure details:     Patient status:  Alert    Patient tolerance of procedure:  Tolerated well, no immediate complications  Procedural Sedation  Date/Time: 3/17/2020 10:01 PM  Performed by: Yair Oliva MD  Authorized by: Yair Oliva MD     Consent:     Consent obtained:  Verbal and written    Consent given by:  Patient    Risks discussed:  Respiratory compromise necessitating ventilatory assistance and intubation, vomiting, prolonged hypoxia resulting in organ damage, allergic reaction, dysrhythmia, inadequate sedation and nausea  Indications:      Procedure performed:  Cardioversion    Procedure necessitating sedation performed by:  Physician performing sedation    Intended level of sedation:  Moderate (conscious sedation)  Pre-sedation assessment:     Time since last food or drink:  2 hours    ASA classification: class 1 - normal, healthy patient      Neck mobility: normal      Mallampati score:  I - soft palate, uvula, fauces, pillars visible    Pre-sedation assessments completed and reviewed: airway patency, cardiovascular function, hydration status, mental status, nausea/vomiting, pain level, respiratory function and temperature      Pre-sedation assessment completed:  3/17/2020 9:50 PM  Immediate pre-procedure details:     Reassessment: Patient reassessed immediately prior to procedure      Reviewed: vital signs, relevant labs/tests and NPO status      Verified: bag valve mask available, emergency equipment available, intubation equipment available, IV patency confirmed, oxygen available and suction available    Procedure details (see MAR for exact dosages):     Sedation start time:  3/17/2020 10:01 PM    Preoxygenation:  Nasal cannula and nonrebreather mask    Sedation:  Propofol    Intra-procedure monitoring:  Blood pressure monitoring, cardiac monitor, continuous pulse oximetry, frequent LOC assessments and frequent vital sign checks    Intra-procedure events: none      Sedation end time:  3/17/2020 10:14 PM    Total sedation time (minutes):  13  Post-procedure details:     Post-sedation assessment completed:  3/17/2020 10:16 PM    Attendance: Constant attendance by certified staff until patient recovered      Recovery: Patient returned to pre-procedure baseline      Post-sedation assessments completed and reviewed: airway patency, cardiovascular function, hydration status, mental status, nausea/vomiting, pain level and respiratory function      Specimens recovered:  None    Patient is stable for discharge or admission: yes      Patient tolerance:   Tolerated well, no immediate complications  Procedural Sedation  Date/Time: 3/17/2020 10:20 PM  Performed by: Yair Oliva MD  Authorized by: Yair Oliva MD     Consent:     Consent obtained:  Verbal and written    Consent given by:  Patient    Risks discussed:  Prolonged hypoxia resulting in organ damage, respiratory compromise necessitating ventilatory assistance and intubation, vomiting, allergic reaction, dysrhythmia, inadequate sedation and nausea  Indications:     Procedure performed:  Cardioversion    Procedure necessitating sedation performed by:  Physician performing sedation    Intended level of sedation:  Moderate (conscious sedation)  Pre-sedation assessment:     Time since last food or drink:  2 hours    ASA classification: class 1 - normal, healthy patient      Neck mobility: normal      Mallampati score:  I - soft palate, uvula, fauces, pillars visible    Pre-sedation assessments completed and reviewed: airway patency, cardiovascular function, hydration status, mental status, nausea/vomiting, pain level, respiratory function and temperature      Pre-sedation assessment completed:  3/17/2020 10:10 PM  Immediate pre-procedure details:     Reassessment: Patient reassessed immediately prior to procedure      Reviewed: vital signs, relevant labs/tests and NPO status      Verified: bag valve mask available, emergency equipment available, intubation equipment available, IV patency confirmed, oxygen available and suction available    Procedure details (see MAR for exact dosages):     Sedation start time:  3/17/2020 10:20 PM    Preoxygenation:  Nasal cannula and nonrebreather mask    Sedation:  Propofol    Intra-procedure monitoring:  Blood pressure monitoring, cardiac monitor, continuous pulse oximetry, frequent LOC assessments and frequent vital sign checks    Intra-procedure events: none      Sedation end time:  3/17/2020 10:32 PM    Total sedation time (minutes):  12  Post-procedure  details:     Post-sedation assessment completed:  3/17/2020 10:34 PM    Attendance: Constant attendance by certified staff until patient recovered      Recovery: Patient returned to pre-procedure baseline      Post-sedation assessments completed and reviewed: airway patency, cardiovascular function, hydration status, mental status, nausea/vomiting, pain level and respiratory function      Specimens recovered:  None    Patient is stable for discharge or admission: yes      Patient tolerance:  Tolerated well, no immediate complications  Critical Care  Performed by: Yair Oliva MD  Authorized by: Yair Oliva MD     Critical care provider statement:     Critical care time (minutes):  40    Critical care time was exclusive of:  Separately billable procedures and treating other patients    Critical care was necessary to treat or prevent imminent or life-threatening deterioration of the following conditions:  Cardiac failure    Critical care was time spent personally by me on the following activities:  Development of treatment plan with patient or surrogate, evaluation of patient's response to treatment, examination of patient, interpretation of cardiac output measurements, obtaining history from patient or surrogate, ordering and performing treatments and interventions, ordering and review of laboratory studies, pulse oximetry and re-evaluation of patient's condition    I assumed direction of critical care for this patient from another provider in my specialty: no              ED Course  ED Course as of Mar 17 0417   Mon Mar 16, 2020   2301 Dr. Oliva paged the intensivist.    [DD]   2308 Dr. Oliva discussed the case with intensivist.    [DD]   Tue Mar 17, 2020   0000 49-year-old female presents for evaluation of heart palpitations and rapid heart rate.  The patient is a nurse and a very good historian.  Her symptoms started approximately 20 minutes before coming to the emergency department.  On EMS arrival  to the patient's home, she was markedly tachycardic with irregularly irregular narrow complex rhythm consistent with atrial fibrillation with rapid ventricular response.  On arrival to the ED, EKG revealed atrial fibrillation with rapid ventricular response and heart rates ranging from approximately 175-210.  No family history of sudden cardiac death.  The patient is an excellent historian with a clear time of onset and an excellent candidate for electrical cardioversion.  After obtaining informed consent, the patient was sedated with propofol and electrically cardioverted at 150 J.  She transiently returned to normal sinus rhythm for approximately 3 minutes.  Her repeat EKG revealed normal sinus rhythm with normal RI and QT intervals and no obvious delta wave.  No EKG evidence of Brugada syndrome.  No family history of sudden cardiac death.  The patient then returned to atrial fibrillation with rapid ventricular response and was once again electrically cardioverted at 150 J with transient success for approximately 1 minute.  Labs remarkable for potassium of 2.9.  Potassium replaced both orally and intravenously.  Magnesium given as well.  Following her second cardioversion, the patient had a few visible runs of sustained ventricular tachycardia lasting for seconds at a time with accompanying dizziness and was symptomatic.  At this point, I felt that she warranted admission to the ICU for close observation.  Diltiazem initiated for rate control.  I discussed the patient's case with Dr. Garibay, and the patient will be admitted to the ICU under his care.  She is aware/agreeable with the plan at this time.    [DD]   0031     [DD]   0031 [id number] score of 1.    [DD]      ED Course User Index  [DD] Yair Oliva MD     No results found for this or any previous visit (from the past 24 hour(s)).  Note: In addition to lab results from this visit, the labs listed above may include labs taken at another facility  or during a different encounter within the last 24 hours. Please correlate lab times with ED admission and discharge times for further clarification of the services performed during this visit.    XR Chest 1 View    (Results Pending)     Vitals:    03/16/20 2208 03/16/20 2209 03/16/20 2210 03/16/20 2211   BP:   100/54    Patient Position:       Pulse: (!) 158 (!) 135 (!) 137 (!) 140   Resp:       SpO2: 100% 100% 94% 100%   Weight:       Height:         Medications   sodium chloride 0.9 % flush 10 mL (has no administration in time range)   Propofol (DIPRIVAN) injection 100 mg (has no administration in time range)   Propofol (DIPRIVAN) injection (20 mg Intravenous Given 3/16/20 2207)   Propofol (DIPRIVAN) injection (50 mg Intravenous Given 3/16/20 2204)     ECG/EMG Results (last 24 hours)     Procedure Component Value Units Date/Time    ECG 12 Lead [[id number]] Collected:  03/16/20 2153     Updated:  03/16/20 2153    ECG 12 Lead [[id number]] Collected:  03/16/20 2213     Updated:  03/16/20 2213        ECG 12 Lead         ECG 12 Lead                                                Recent Results (from the past 24 hour(s))   Comprehensive Metabolic Panel    Collection Time: 03/16/20 10:00 PM   Result Value Ref Range    Glucose 155 (H) 65 - 99 mg/dL    BUN 12 6 - 20 mg/dL    Creatinine 0.92 0.57 - 1.00 mg/dL    Sodium 139 136 - 145 mmol/L    Potassium 2.9 (L) 3.5 - 5.2 mmol/L    Chloride 100 98 - 107 mmol/L    CO2 22.0 22.0 - 29.0 mmol/L    Calcium 9.3 8.6 - 10.5 mg/dL    Total Protein 7.5 6.0 - 8.5 g/dL    Albumin 4.70 3.50 - 5.20 g/dL    ALT (SGPT) 11 1 - 33 U/L    AST (SGOT) 15 1 - 32 U/L    Alkaline Phosphatase 63 39 - 117 U/L    Total Bilirubin 0.4 0.2 - 1.2 mg/dL    eGFR Non African Amer 65 >60 mL/min/1.73    Globulin 2.8 gm/dL    A/G Ratio 1.7 g/dL    BUN/Creatinine Ratio 13.0 7.0 - 25.0    Anion Gap 17.0 (H) 5.0 - 15.0 mmol/L   Magnesium    Collection Time: 03/16/20 10:00 PM   Result Value Ref Range    Magnesium 2.0  1.6 - 2.6 mg/dL   Troponin    Collection Time: 03/16/20 10:00 PM   Result Value Ref Range    Troponin T <0.010 0.000 - 0.030 ng/mL   TSH    Collection Time: 03/16/20 10:00 PM   Result Value Ref Range    TSH 7.790 (H) 0.270 - 4.200 uIU/mL   BNP    Collection Time: 03/16/20 10:00 PM   Result Value Ref Range    proBNP 247.2 5.0 - 450.0 pg/mL   Light Blue Top    Collection Time: 03/16/20 10:00 PM   Result Value Ref Range    Extra Tube hold for add-on    Green Top (Gel)    Collection Time: 03/16/20 10:00 PM   Result Value Ref Range    Extra Tube Hold for add-ons.    Lavender Top    Collection Time: 03/16/20 10:00 PM   Result Value Ref Range    Extra Tube hold for add-on    Gold Top - SST    Collection Time: 03/16/20 10:00 PM   Result Value Ref Range    Extra Tube Hold for add-ons.    CBC Auto Differential    Collection Time: 03/16/20 10:00 PM   Result Value Ref Range    WBC 14.83 (H) 3.40 - 10.80 10*3/mm3    RBC 4.85 3.77 - 5.28 10*6/mm3    Hemoglobin 13.8 12.0 - 15.9 g/dL    Hematocrit 41.7 34.0 - 46.6 %    MCV 86.0 79.0 - 97.0 fL    MCH 28.5 26.6 - 33.0 pg    MCHC 33.1 31.5 - 35.7 g/dL    RDW 12.2 (L) 12.3 - 15.4 %    RDW-SD 38.4 37.0 - 54.0 fl    MPV 10.9 6.0 - 12.0 fL    Platelets 334 140 - 450 10*3/mm3    Neutrophil % 49.2 42.7 - 76.0 %    Lymphocyte % 41.5 19.6 - 45.3 %    Monocyte % 6.9 5.0 - 12.0 %    Eosinophil % 1.7 0.3 - 6.2 %    Basophil % 0.5 0.0 - 1.5 %    Immature Grans % 0.2 0.0 - 0.5 %    Neutrophils, Absolute 7.28 (H) 1.70 - 7.00 10*3/mm3    Lymphocytes, Absolute 6.16 (H) 0.70 - 3.10 10*3/mm3    Monocytes, Absolute 1.03 (H) 0.10 - 0.90 10*3/mm3    Eosinophils, Absolute 0.25 0.00 - 0.40 10*3/mm3    Basophils, Absolute 0.08 0.00 - 0.20 10*3/mm3    Immature Grans, Absolute 0.03 0.00 - 0.05 10*3/mm3    nRBC 0.0 0.0 - 0.2 /100 WBC   T4, Free    Collection Time: 03/16/20 10:00 PM   Result Value Ref Range    Free T4 1.29 0.93 - 1.70 ng/dL   Urine Drug Screen - Urine, Clean Catch    Collection Time: 03/17/20   1:00 AM   Result Value Ref Range    THC, Screen, Urine Negative Negative    Phencyclidine (PCP), Urine Negative Negative    Cocaine Screen, Urine Negative Negative    Methamphetamine, Ur Negative Negative    Opiate Screen Negative Negative    Amphetamine Screen, Urine Negative Negative    Benzodiazepine Screen, Urine Negative Negative    Tricyclic Antidepressants Screen Negative Negative    Methadone Screen, Urine Negative Negative    Barbiturates Screen, Urine Negative Negative    Oxycodone Screen, Urine Negative Negative    Propoxyphene Screen Negative Negative    Buprenorphine, Screen, Urine Negative Negative     Note: In addition to lab results from this visit, the labs listed above may include labs taken at another facility or during a different encounter within the last 24 hours. Please correlate lab times with ED admission and discharge times for further clarification of the services performed during this visit.    XR Chest 1 View   Final Result   No acute cardiopulmonary findings.      Signer Name: Bandar oRck MD    Signed: 3/16/2020 10:34 PM    Workstation Name: [email or web address]     [facility]        Vitals:    03/17/20 0230 03/17/20 0231 03/17/20 0245 03/17/20 0300   BP: 125/67  (P) 104/79 (P) 100/60   BP Location:       Patient Position:       Pulse: (!) 138 (!) 130 (!) (P) 149 (!) (P) 129   Resp:       Temp:       TempSrc:       SpO2: 98% 98% (P) 100% (P) 97%   Weight:       Height:         Medications   sodium chloride 0.9 % flush 10 mL (has no administration in time range)   Propofol (DIPRIVAN) injection 100 mg (has no administration in time range)   Propofol (DIPRIVAN) injection 100 mg (has no administration in time range)   amiodarone in dextrose 5% (NEXTERONE) loading dose 150mg/100mL (0 mg Intravenous Stopped 3/16/20 0740)     Followed by   amiodarone (NEXTERONE) 360 mg/200 mL (1.8 mg/mL) infusion (1 mg/min Intravenous New Bag 3/17/20 0240)     Followed by   amiodarone  (NEXTERONE) 360 mg/200 mL (1.8 mg/mL) infusion (has no administration in time range)   potassium chloride (MICRO-K) CR capsule 40 mEq (40 mEq Oral Given 3/17/20 0101)     Or   potassium chloride (KLOR-CON) packet 40 mEq ( Oral Not Given:  See Alt 3/17/20 0101)     Or   potassium chloride 10 mEq in 100 mL IVPB ( Intravenous Not Given:  See Alt 3/17/20 0101)   heparin (porcine) 5000 UNIT/ML injection 5,000 Units (5,000 Units Subcutaneous Given 3/17/20 0041)   ondansetron (ZOFRAN) tablet 4 mg (has no administration in time range)   ALPRAZolam (XANAX) tablet 0.25 mg (0.25 mg Oral Given 3/17/20 0041)   amiodarone in dextrose 5% (NEXTERONE) loading dose 150mg/100mL ( Intravenous Canceled Entry 3/17/20 0243)   Propofol (DIPRIVAN) injection (50 mg Intravenous Given 3/16/20 2204)   Propofol (DIPRIVAN) injection (20 mg Intravenous Given 3/16/20 2207)   Propofol (DIPRIVAN) injection (20 mg Intravenous Given 3/16/20 2223)   sodium chloride 0.9 % bolus 2,000 mL (0 mL Intravenous Stopped 3/16/20 2332)   magnesium sulfate 2g/50 mL (PREMIX) infusion (2 g Intravenous New Bag 3/17/20 0041)   potassium chloride (MICRO-K) CR capsule 20 mEq (20 mEq Oral Given 3/16/20 2334)   potassium chloride 10 mEq in 100 mL IVPB (0 mEq Intravenous Stopped 3/17/20 0020)     ECG/EMG Results (last 24 hours)     Procedure Component Value Units Date/Time    ECG 12 Lead [[id number]] Collected:  03/16/20 2153     Updated:  03/16/20 2153    ECG 12 Lead [[id number]] Collected:  03/16/20 2213     Updated:  03/16/20 2213    ECG 12 Lead [[id number]] Collected:  03/16/20 2228     Updated:  03/16/20 2228        ECG 12 Lead         ECG 12 Lead         ECG 12 Lead                 MDM  Number of Diagnoses or Management Options  Critical Care  Total time providing critical care: 30-74 minutes      Final diagnoses:   Atrial fibrillation with RVR (CMS/HCC)   Hypokalemia       Documentation assistance provided by guillermina Archibald.  Information recorded by the scribe was  done at my direction and has been verified and validated by me.     DraganSalud  03/16/20 2215       [place]  03/16/20 2235       [place]  03/17/20 0029       Yair Oliva MD  03/17/20 0418      Electronically signed by Yair Oliva MD at 03/17/20 0418       Vital Signs (last day)     Date/Time   Temp   Temp src   Pulse   Resp   BP   Patient Position   SpO2    03/17/20 0930   --   --   74   --   113/63   --   97    03/17/20 0900   --   --   74   --   102/56   --   99    03/17/20 0830   --   --   84   --   115/71   --   99    03/17/20 0800   98.4 (36.9)   Oral   74   16   110/62   Lying   98    03/17/20 0730   --   --   81   --   111/71   --   99    03/17/20 0700   --   --   73   --   91/58   --   98    03/17/20 0630   --   --   80   --   116/67   --   98    03/17/20 0600   --   --   73   18   102/65   --   97    03/17/20 0530   --   --   74   --   95/52   --   96    03/17/20 0500   --   --   80   --   107/59   --   98    03/17/20 0445   --   --   80   --   --   --   99    03/17/20 0430   --   --   85   --   103/48   --   97    03/17/20 0415   --   --   83   --   --   --   99    03/17/20 0400   98.3 (36.8)   Oral   84   16   110/60   --   97    03/17/20 0345   --   --   84   --   --   --   98    03/17/20 0330   --   --   92   --   94/57   --   97    03/17/20 0315   --   --   93   --   --   --   97    03/17/20 0300   --   --   (!) 129   --   100/60   --   97    03/17/20 0245   --   --   (!) 149   --   104/79   --   100    03/17/20 0231   --   --   (!) 130   --   --   --   98 03/17/20 0230   --   --   (!) 138   --   125/67   --   98 03/17/20 0229   --   --   (!) 133   --   --   --   97 03/17/20 0228   --   --   (!) 147   --   --   --   98 03/17/20 0227   --   --   (!) 206   --   --   --   99 03/17/20 0226   --   --   (!) 200   --   --   --   98 03/17/20 0215   --   --   (!) 137   --   118/89   --   98 03/17/20 0115   --   --   (!) 140   --   --   --   99     03/17/20 0100   --   --   (!) 144   --   --   --   97    03/17/20 0045   --   --   (!) 140   --   --   --   97    03/17/20 0030   --   --   (!) 152   --   --   --   99    03/17/20 0018   98.5 (36.9)   Oral   (!) 144   16   129/71   Lying   98    03/16/20 2356   --   --   (!) 137   --   --   --   100 03/16/20 2355   --   --   (!) 149   --   115/81   --   100    03/16/20 2354   --   --   (!) 146   --   --   --   100 03/16/20 2353   --   --   (!) 129   --   --   --   99 03/16/20 2352   --   --   (!) 154   --   --   --   100 03/16/20 2351   --   --   (!) 146   --   --   --   100 03/16/20 2350   --   --   (!) 134   --   102/77   --   97 03/16/20 2349   --   --   (!) 140   --   --   --   100 03/16/20 2348   --   --   (!) 144   --   --   --   96    03/16/20 2346   --   --   (!) 142   --   --   --   100 03/16/20 2345   --   --   (!) 144   --   135/91   --   100 03/16/20 2344   --   --   (!) 147   --   --   --   100 03/16/20 2343   --   --   (!) 146   --   --   --   100 03/16/20 2342   --   --   (!) 146   --   --   --   100 03/16/20 2341   --   --   (!) 152   --   --   --   100 03/16/20 2340   --   --   (!) 141   --   128/78   --   100 03/16/20 2339   --   --   (!) 151   --   --   --   100 03/16/20 2338   --   --   (!) 146   --   --   --   99 03/16/20 2337   --   --   (!) 152   --   --   --   99    03/16/20 2336   --   --   (!) 161   --   --   --   98    03/16/20 2335   --   --   (!) 160   --   121/68   --   97    03/16/20 2334   --   --   (!) 156   --   --   --   100    03/16/20 2333   --   --   (!) 156   --   --   --   100    03/16/20 2332   --   --   (!) 174   --   --   --   95    03/16/20 2331   --   --   (!) 168   --   --   --   99    03/16/20 2330   --   --   (!) 156   --   114/70   --   100    03/16/20 2329   --   --   --   --   --   --   98    03/16/20 2328   --   --   (!) 192   --   --   --   100    03/16/20 2327   --   --   (!) 170   --   --   --   100    03/16/20  2326   --   --   (!) 161   --   --   --   99 03/16/20 2325   --   --   (!) 149   --   145/94   --   99 03/16/20 2324   --   --   (!) 163   --   --   --   100 03/16/20 2323   --   --   (!) 163   --   --   --   100 03/16/20 2322   --   --   (!) 161   --   --   --   100 03/16/20 2321   --   --   (!) 140   --   --   --   100 03/16/20 2320   --   --   (!) 176   --   140/80   --   99 03/16/20 2319   --   --   --   --   --   --   93    03/16/20 2317   --   --   (!) 189   --   --   --   96 03/16/20 2316   --   --   (!) 170   --   --   --   99 03/16/20 2315   --   --   (!) 146   --   136/83   --   100 03/16/20 2312   --   --   (!) 165   --   --   --   100 03/16/20 2311   --   --   (!) 200   --   --   --   100 03/16/20 2310   --   --   (!) 161   --   118/75   --   100 03/16/20 2309   --   --   (!) 174   --   --   --   100 03/16/20 2308   --   --   (!) 170   --   --   --   100 03/16/20 2307   --   --   (!) 176   --   --   --   100 03/16/20 2306   --   --   (!) 163   --   138/75   --   100 03/16/20 2305   --   --   (!) 179   --   --   --   100 03/16/20 2304   --   --   (!) 210   --   --   --   100 03/16/20 2303   --   --   (!) 172   --   --   --   100 03/16/20 2302   --   --   --   --   --   --   99 03/16/20 2301   --   --   --   --   --   --   100 03/16/20 2300   --   --   (!) 176   --   123/95   --   100 03/16/20 2259   --   --   (!) 184   --   --   --   100 03/16/20 2258   --   --   (!) 200   --   --   --   100 03/16/20 2257   --   --   (!) 179   --   --   --   100 03/16/20 2256   --   --   (!) 181   --   --   --   100 03/16/20 2255   --   --   (!) 163   --   146/77   --   100 03/16/20 2254   98.1 (36.7)   Oral   (!) 170   --   --   --   100 03/16/20 2253   --   --   (!) 170   --   --   --   97 03/16/20 2252   --   --   (!) 274   --   --   --   91 03/16/20 2251   --   --   --   --   --   --   100 03/16/20 2248   --   --   (!) 156    --   --   --   --    03/16/20 22:46:25   --   --   (!) 147   18   92/68   --   100    03/16/20 2245   --   --   (!) 137   --   92/68   --   --    03/16/20 2242   --   --   (!) 141   --   --   --   --    03/16/20 2240   --   --   --   --   (!) 83/69   --   --    03/16/20 2239   --   --   (!) 131   --   --   --   --    03/16/20 2237   --   --   (!) 142   --   --   --   100 03/16/20 2236   --   --   (!) 151   --   --   --   100 03/16/20 2235   --   --   (!) 140   --   97/79   --   100 03/16/20 2234   --   --   (!) 141   --   --   --   100 03/16/20 2233   --   --   (!) 163   --   --   --   100 03/16/20 2232   --   --   (!) 158   --   --   --   100 03/16/20 2231   --   --   (!) 160   --   --   --   100 03/16/20 2230   --   --   (!) 128   --   (!) 86/67   --   100 03/16/20 2229   --   --   112   --   --   --   99 03/16/20 2228   --   --   (!) 129   --   (!) 88/56   --   100 03/16/20 2227   --   --   91   --   --   --   99 03/16/20 2226   --   --   93   --   --   --   99 03/16/20 2225   --   --   90   --   94/54   --   100 03/16/20 2224   --   --   (!) 152   --   --   --   100 03/16/20 2223   --   --   (!) 142   --   --   --   100 03/16/20 2222   --   --   (!) 161   --   --   --   100 03/16/20 2221   --   --   (!) 170   --   --   --   100 03/16/20 2220   --   --   (!) 165   --   121/85   --   97    03/16/20 2219   --   --   (!) 156   --   --   --   99    03/16/20 2218   --   --   (!) 172   --   --   --   100    03/16/20 2217   --   --   (!) 165   --   --   --   100 03/16/20 2216   --   --   (!) 168   --   --   --   100 03/16/20 2215   --   --   (!) 160   --   102/79   --   100 03/16/20 2214   --   --   93   --   --   --   100 03/16/20 2213   --   --   (!) 130   --   --   --   100 03/16/20 2212   --   --   (!) 138   --   --   --   100 03/16/20 2211   --   --   (!) 140   --   --   --   100 03/16/20 2210   --   --   (!) 137   --   100/54   --   94     20   --   --   (!) 135   --   --   --   20   --   --   (!) 158   --   --   --   20 22:07:41   --   --   --   --   104/46   --   --    20   --   --   (!) 165   --   --   --   20   --   --   (!) 165   --   --   --   20   --   --   (!) 168   --   104/46   --   20   --   --   (!) 168   --   --   --   20   --   --   (!) 179   --   --   --   20   --   --   (!) 194   --   148/92   --   20   --   --   (!) 186   --   --   --   20   --   --   (!) 170   --   149/85   --   20   --   --   (!) 172   --   --   --   20   --   --   (!) 197   --   --   --   20   --   --   (!) 181   --   --   --   20   --   --   (!) 174   20   155/94   Sitting   20   --   --   (!) 192   --   --   --   20   --   --   (!) 216   --   --   --   20   --   --   (!) 165   --   --   --   20   --   --   (!) 189   --   --   --   99                Facility-Administered Medications as of 3/17/2020   Medication Dose Route Frequency Provider Last Rate Last Dose   • ALPRAZolam (XANAX) tablet 0.25 mg  0.25 mg Oral BID PRN Juan Garibay MD   0.25 mg at 20 0041   • [COMPLETED] amiodarone in dextrose 5% (NEXTERONE) loading dose 150mg/100mL  150 mg Intravenous Once Maureen Sánchez, APRN   Stopped at 20 2343    Followed by   • [] amiodarone (NEXTERONE) 360 mg/200 mL (1.8 mg/mL) infusion  1 mg/min Intravenous Continuous Maureen Sánchez, APRN 33.3 mL/hr at 20 0240 1 mg/min at 20 0240    Followed by   • amiodarone (NEXTERONE) 360 mg/200 mL (1.8 mg/mL) infusion  0.5 mg/min Intravenous Continuous Maureen Sánchez APRN 16.67 mL/hr at 20 0613 0.5 mg/min at 20 0613   • amiodarone in dextrose 5% (NEXTERONE) loading  dose 150mg/100mL  150 mg Intravenous Once Maureen Sánchez, APRN       • apixaban (ELIQUIS) tablet 5 mg  5 mg Oral Q12H Avtar Currie MD       • [COMPLETED] magnesium sulfate 2g/50 mL (PREMIX) infusion  2 g Intravenous Once Yair Oliva MD   2 g at 03/17/20 0041   • metoprolol tartrate (LOPRESSOR) tablet 25 mg  25 mg Oral Q6H Avtar Currie MD       • ondansetron (ZOFRAN) tablet 4 mg  4 mg Oral Q6H PRN Maureen Sánchez, APRN       • potassium chloride (MICRO-K) CR capsule 40 mEq  40 mEq Oral PRN Maureen Sánchez APRN   40 mEq at 03/17/20 0101    Or   • potassium chloride (KLOR-CON) packet 40 mEq  40 mEq Oral PRN Maureen Sánchez, APRN        Or   • potassium chloride 10 mEq in 100 mL IVPB  10 mEq Intravenous Q1H PRN Maureen Sánchez APRRITIKA       • [COMPLETED] potassium chloride (MICRO-K) CR capsule 20 mEq  20 mEq Oral Once Yair Oliva MD   20 mEq at 03/16/20 2334   • [COMPLETED] potassium chloride 10 mEq in 100 mL IVPB  10 mEq Intravenous Once Yair Oliva MD   Stopped at 03/17/20 0020   • [COMPLETED] Propofol (DIPRIVAN) injection   Intravenous Code / Trauma / Sedation Medication Yair Oliva MD   50 mg at 03/16/20 2204   • [COMPLETED] Propofol (DIPRIVAN) injection   Intravenous Code / Trauma / Sedation Medication Yair Oliva MD   20 mg at 03/16/20 2207   • [COMPLETED] Propofol (DIPRIVAN) injection   Intravenous Code / Trauma / Sedation Medication Yair Oliva MD   20 mg at 03/16/20 2223   • [COMPLETED] sodium chloride 0.9 % bolus 2,000 mL  2,000 mL Intravenous Once Yair Oliva MD   Stopped at 03/16/20 2332   • sodium chloride 0.9 % flush 10 mL  10 mL Intravenous PRN aYir Oliva MD           Lab Results (last 24 hours)     Procedure Component Value Units Date/Time    Pregnancy, Urine - Urine, Clean Catch [[id number]]  (Normal) Collected:  03/17/20 0100    Specimen:  Urine, Clean Catch Updated:  03/17/20 0955     HCG, Urine QL  Negative    Urinalysis With Culture If Indicated - Urine, Clean Catch [[id number]]  (Normal) Collected:  03/17/20 0100    Specimen:  Urine, Clean Catch Updated:  03/17/20 0627     Color, UA Yellow     Appearance, UA Clear     pH, UA 6.5     Specific Gravity, UA 1.006     Glucose, UA Negative     Ketones, UA Negative     Bilirubin, UA Negative     Blood, UA Negative     Protein, UA Negative     Leuk Esterase, UA Negative     Nitrite, UA Negative     Urobilinogen, UA 0.2 E.U./dL    Narrative:       Urine microscopic not indicated.    Troponin [[id number]]  (Normal) Collected:  03/17/20 0405    Specimen:  Blood Updated:  03/17/20 0519     Troponin T <0.010 ng/mL     Narrative:       Troponin T Reference Range:  <= 0.03 ng/mL-   Negative for AMI  >0.03 ng/mL-     Abnormal for myocardial necrosis.  Clinicians would have to utilize clinical acumen, EKG, Troponin and serial changes to determine if it is an Acute Myocardial Infarction or myocardial injury due to an underlying chronic condition.       Results may be falsely decreased if patient taking Biotin.      Basic Metabolic Panel [[id number]]  (Abnormal) Collected:  03/17/20 0405    Specimen:  Blood Updated:  03/17/20 0517     Glucose 122 mg/dL      BUN 11 mg/dL      Creatinine 0.72 mg/dL      Sodium 138 mmol/L      Potassium 4.4 mmol/L      Chloride 107 mmol/L      CO2 20.0 mmol/L      Calcium 8.0 mg/dL      eGFR Non African Amer 86 mL/min/1.73      BUN/Creatinine Ratio 15.3     Anion Gap 11.0 mmol/L     Narrative:       GFR Normal >60  Chronic Kidney Disease <60  Kidney Failure <15      Magnesium [[id number]]  (Normal) Collected:  03/17/20 0405    Specimen:  Blood Updated:  03/17/20 0515     Magnesium 2.2 mg/dL     Phosphorus [[id number]]  (Normal) Collected:  03/17/20 0405    Specimen:  Blood Updated:  03/17/20 0515     Phosphorus 2.8 mg/dL     Calcium, Ionized [[id number]]  (Normal) Collected:  03/17/20 0405    Specimen:  Blood Updated:  03/17/20 0512     Ionized Calcium  1.21 mmol/L     CBC (No Diff) [[id number]]  (Abnormal) Collected:  03/17/20 0405    Specimen:  Blood Updated:  03/17/20 0457     WBC 12.53 10*3/mm3      RBC 4.40 10*6/mm3      Hemoglobin 12.1 g/dL      Hematocrit 37.9 %      MCV 86.1 fL      MCH 27.5 pg      MCHC 31.9 g/dL      RDW 12.3 %      RDW-SD 38.7 fl      MPV 10.5 fL      Platelets 266 10*3/mm3     Urine Drug Screen - Urine, Clean Catch [[id number]]  (Normal) Collected:  03/17/20 0100    Specimen:  Urine, Clean Catch Updated:  03/17/20 0200     THC, Screen, Urine Negative     Phencyclidine (PCP), Urine Negative     Cocaine Screen, Urine Negative     Methamphetamine, Ur Negative     Opiate Screen Negative     Amphetamine Screen, Urine Negative     Benzodiazepine Screen, Urine Negative     Tricyclic Antidepressants Screen Negative     Methadone Screen, Urine Negative     Barbiturates Screen, Urine Negative     Oxycodone Screen, Urine Negative     Propoxyphene Screen Negative     Buprenorphine, Screen, Urine Negative    Narrative:       Cutoff For Drugs Screened:    Amphetamines               500 ng/ml  Barbiturates               200 ng/ml  Benzodiazepines            150 ng/ml  Cocaine                    150 ng/ml  Methadone                  200 ng/ml  Opiates                    100 ng/ml  Phencyclidine               25 ng/ml  THC                            50 ng/ml  Methamphetamine            500 ng/ml  Tricyclic Antidepressants  300 ng/ml  Oxycodone                  100 ng/ml  Propoxyphene               300 ng/ml  Buprenorphine               10 ng/ml    The normal value for all drugs tested is negative. This report includes unconfirmed screening results, with the cutoff values listed, to be used for medical treatment purposes only.  Unconfirmed results must not be used for non-medical purposes such as employment or legal testing.  Clinical consideration should be applied to any drug of abuse test, particularly when unconfirmed results are used.      T4, Free  [[id number]]  (Normal) Collected:  03/16/20 2200    Specimen:  Blood Updated:  03/17/20 0009     Free T4 1.29 ng/dL     Narrative:       Results may be falsely increased if patient taking Biotin.      T3, Free [[id number]] Collected:  03/16/20 2200    Specimen:  Blood Updated:  03/16/20 2346    Troponin [[id number]]  (Normal) Collected:  03/16/20 2200    Specimen:  Blood Updated:  03/16/20 2325     Troponin T <0.010 ng/mL     Narrative:       Troponin T Reference Range:  <= 0.03 ng/mL-   Negative for AMI  >0.03 ng/mL-     Abnormal for myocardial necrosis.  Clinicians would have to utilize clinical acumen, EKG, Troponin and serial changes to determine if it is an Acute Myocardial Infarction or myocardial injury due to an underlying chronic condition.       Results may be falsely decreased if patient taking Biotin.      TSH [[id number]]  (Abnormal) Collected:  03/16/20 2200    Specimen:  Blood Updated:  03/16/20 2325     TSH 7.790 uIU/mL     Narrative:       Due to abnormal TSH results, suggest ordering Free T4.    BNP [[id number]]  (Normal) Collected:  03/16/20 2200    Specimen:  Blood Updated:  03/16/20 2325     proBNP 247.2 pg/mL     Narrative:       Among patients with dyspnea, NT-proBNP is highly sensitive for the detection of acute congestive heart failure. In addition NT-proBNP of <300 pg/ml effectively rules out acute congestive heart failure with 99% negative predictive value.    Results may be falsely decreased if patient taking Biotin.      Comprehensive Metabolic Panel [[id number]]  (Abnormal) Collected:  03/16/20 2200    Specimen:  Blood Updated:  03/16/20 2321     Glucose 155 mg/dL      BUN 12 mg/dL      Creatinine 0.92 mg/dL      Sodium 139 mmol/L      Potassium 2.9 mmol/L      Chloride 100 mmol/L      CO2 22.0 mmol/L      Calcium 9.3 mg/dL      Total Protein 7.5 g/dL      Albumin 4.70 g/dL      ALT (SGPT) 11 U/L      AST (SGOT) 15 U/L      Alkaline Phosphatase 63 U/L      Total Bilirubin 0.4 mg/dL      eGFR Non   Amer 65 mL/min/1.73      Globulin 2.8 gm/dL      A/G Ratio 1.7 g/dL      BUN/Creatinine Ratio 13.0     Anion Gap 17.0 mmol/L     Narrative:       GFR Normal >60  Chronic Kidney Disease <60  Kidney Failure <15      Magnesium [[id number]]  (Normal) Collected:  03/16/20 2200    Specimen:  Blood Updated:  03/16/20 2321     Magnesium 2.0 mg/dL     [place] [facility] [[id number]] Collected:  03/16/20 2200    Specimen:  Blood Updated:  03/16/20 2315    Narrative:       The following orders were created for panel order [place] [facility].  Procedure                               Abnormality         Status                     ---------                               -----------         ------                     Light Blue Top[[id number]]                                    Final result               Green Top (Gel)[[id number]]                                   Final result               Lavender Top[[id number]]                                      Final result               Gold Top - SST[[id number]]                                    Final result                 Please view results for these tests on the individual orders.    Lavender Top [[id number]] Collected:  03/16/20 2200    Specimen:  Blood Updated:  03/16/20 2315     Extra Tube hold for add-on     Comment: Auto resulted       Light Blue Top [[id number]] Collected:  03/16/20 2200    Specimen:  Blood Updated:  03/16/20 2315     Extra Tube hold for add-on     Comment: Auto resulted       Green Top (Gel) [[id number]] Collected:  03/16/20 2200    Specimen:  Blood Updated:  03/16/20 2315     Extra Tube Hold for add-ons.     Comment: Auto resulted.       Gold Top - SST [[id number]] Collected:  03/16/20 2200    Specimen:  Blood Updated:  03/16/20 2315     Extra Tube Hold for add-ons.     Comment: Auto resulted.       CBC & Differential [[id number]] Collected:  03/16/20 2200    Specimen:  Blood Updated:  03/16/20 2303    Narrative:       The following orders were created for panel order CBC &  Differential.  Procedure                               Abnormality         Status                     ---------                               -----------         ------                     CBC Auto Differential[[id number]]         Abnormal            Final result                 Please view results for these tests on the individual orders.    CBC Auto Differential [[id number]]  (Abnormal) Collected:  03/16/20 2200    Specimen:  Blood Updated:  03/16/20 2303     WBC 14.83 10*3/mm3      RBC 4.85 10*6/mm3      Hemoglobin 13.8 g/dL      Hematocrit 41.7 %      MCV 86.0 fL      MCH 28.5 pg      MCHC 33.1 g/dL      RDW 12.2 %      RDW-SD 38.4 fl      MPV 10.9 fL      Platelets 334 10*3/mm3      Neutrophil % 49.2 %      Lymphocyte % 41.5 %      Monocyte % 6.9 %      Eosinophil % 1.7 %      Basophil % 0.5 %      Immature Grans % 0.2 %      Neutrophils, Absolute 7.28 10*3/mm3      Lymphocytes, Absolute 6.16 10*3/mm3      Monocytes, Absolute 1.03 10*3/mm3      Eosinophils, Absolute 0.25 10*3/mm3      Basophils, Absolute 0.08 10*3/mm3      Immature Grans, Absolute 0.03 10*3/mm3      nRBC 0.0 /100 WBC         Imaging Results (Last 24 Hours)     Procedure Component Value Units Date/Time    XR Chest 1 View [[id number]] Collected:  03/16/20 2234     Updated:  03/16/20 2236    Narrative:       CR Chest 1 Vw    INDICATION:   49-year-old female with dysrhythmia today.     COMPARISON:    None available.    FINDINGS:  Single portable AP view(s) of the chest.  The heart and mediastinal contours are normal. The lungs are clear. No pneumothorax or pleural effusion.      Impression:       No acute cardiopulmonary findings.    Signer Name: Bandar Rock MD   Signed: 3/16/2020 10:34 PM   Workstation Name: [id number]    [facility]        ECG/EMG Results (last 24 hours)     Procedure Component Value Units Date/Time    ECG 12 Lead [[id number]] Collected:  03/16/20 2153     Updated:  03/17/20 0701    ECG 12 Lead [[id number]]  Collected:  03/16/20 2213     Updated:  03/17/20 0701    ECG 12 Lead [[id number]] Collected:  03/16/20 2228     Updated:  03/17/20 0701    Adult Transthoracic Echo Complete W/ Cont if Necessary Per Protocol [[id number]] Collected:  03/17/20 0822     Updated:  03/17/20 1004     BSA 2.0 m^2      IVSd 0.79 cm      LVIDd 4.4 cm      LVIDs 2.9 cm      LVPWd 0.71 cm      IVS/LVPW 1.1     FS 34.5 %      EDV(Teich) 85.4 ml      ESV(Teich) 30.9 ml      EF(Teich) 63.8 %      EDV(cubed) 82.3 ml      ESV(cubed) 23.1 ml      EF(cubed) 71.9 %      LV mass(C)d 99.0 grams      LV mass(C)dI 50.2 grams/m^2      SV(Teich) 54.5 ml      SI(Teich) 27.6 ml/m^2      SV(cubed) 59.2 ml      SI(cubed) 30.0 ml/m^2      Ao root diam 2.7 cm      Ao root area 5.7 cm^2      LA dimension 2.8 cm      LA/Ao 1.0     LVOT diam 1.7 cm      LVOT area 2.3 cm^2      LVOT area(traced) 2.3 cm^2      LAd major 4.6 cm      LVLd ap4 6.6 cm      EDV(MOD-sp4) 51.0 ml      LVLs ap4 5.5 cm      ESV(MOD-sp4) 20.0 ml      EF(MOD-sp4) 60.8 %      LVLd ap2 7.0 cm      EDV(MOD-sp2) 47.0 ml      LVLs ap2 5.7 cm      ESV(MOD-sp2) 18.0 ml      EF(MOD-sp2) 61.7 %      LA volume 54.0 ml      EF(MOD-bp) 62.0 %      SV(MOD-sp4) 31.0 ml      SI(MOD-sp4) 15.7 ml/m^2      SV(MOD-sp2) 29.0 ml      SI(MOD-sp2) 14.7 ml/m^2      Ao root area (BSA corrected) 1.4     LV Christie Vol (BSA corrected) 25.9 ml/m^2      LV Sys Vol (BSA corrected) 10.1 ml/m^2      LA Volume Index 27.4 ml/m^2      MV E max angelic 96.7 cm/sec      MV A max angelic 53.3 cm/sec      MV E/A 1.8     MV P1/2t max angelic 95.4 cm/sec      MV P1/2t 95.0 msec      MVA(P1/2t) 2.3 cm^2      MV dec slope 294.0 cm/sec^2      MV dec time 0.23 sec      Ao pk angelic 136.0 cm/sec      Ao max PG 7.4 mmHg      Ao max PG (full) 4.1 mmHg      Ao V2 mean 82.4 cm/sec      Ao mean PG 3.0 mmHg      Ao mean PG (full) 1.0 mmHg      Ao V2 VTI 25.8 cm      RADHA(I,A) 1.7 cm^2      RADHA(I,D) 1.7 cm^2      RADHA(V,A) 1.5 cm^2      RADHA(V,D) 1.5 cm^2      LV V1 max  PG 3.3 mmHg      LV V1 mean PG 2.0 mmHg      LV V1 max 91.0 cm/sec      LV V1 mean 58.1 cm/sec      LV V1 VTI 18.8 cm      SV(Ao) 147.7 ml      SI(Ao) 74.9 ml/m^2      SV(LVOT) 42.7 ml      SI(LVOT) 21.6 ml/m^2      TR max travis 93.9 cm/sec      TR max PG 4.0 mmHg      MVA P1/2T LCG 2.3 cm^2      Lat E/e'  8.2     Med E/e' 10.2     Lat Peak E' Travis 11.8 cm/sec      Med Peak E' Travis 9.5 cm/sec       CV ECHO PARKER - BZI_BMI 25.1 kilograms/m^2       CV ECHO PARKER - BSA(HAYCOCK) 2.0 m^2       CV ECHO PARKER - BZI_METRIC_WEIGHT 79.4 kg       CV ECHO PARKER - BZI_METRIC_HEIGHT 177.8 cm      Avg E/e' ratio 9.08

## 2020-03-17 NOTE — NURSING NOTE
Received patient from ICU. Patient is in stable condition and nurse hand off report was completed. Agree with all head to toe assessments. Will continue to monitor.

## 2020-03-17 NOTE — CONSULTS
[facility] AT [address], [address]  [place], [place], [address] [phone]  [email or web address].[email or web address]           INPATIENT CONSULTATION NOTE    Patient Care Team:  Patient Care Team:  Rut Khanna MD as PCP - General (Family Medicine)    Requesting Provider and Reason for consultation: The patient is being seen today at the request of Dr. Perea for atrial fibrillation with RVR.     Chief complaint:   Chief Complaint   Patient presents with   • Irregular Heart Beat            HPI:    Sumi Garay is a 49 y.o. female.  Cardiac problem list:  1. Atrial fibrillation with RVR  2. Possible hypothyroidism    The patient presented to [facility] ED via EMS after experiencing significant palpitations, nausea, lightheadedness, and chest pressure.  Upon EMS arrival she was noted to be in atrial fibrillation with RVR.  Cardioversion was attempted twice in the ED.  The patient initially converted to normal sinus rhythm both attempts, but then A. fib resumed.  She was transferred to the ICU where her diltiazem drip was transitioned to amiodarone.  She later converted to normal sinus rhythm spontaneously.  Metoprolol and Eliquis were also initiated.  She remains currently in normal sinus rhythm and is asymptomatic.  She reports the first time she experienced palpitations was in October 2016.  At that time, she was evaluated by her PCP and underwent a stress test and echocardiogram which were reportedly normal.  Since that time they have reoccurred 4-5 times.  Her palpitations are provoked by stress and alcohol use.  She reports that both of her parents have a history of atrial fibrillation and are followed by Dr. Thompson.    Review of Systems:  Positive for palpitations, nausea, lightheadedness, chest pressure  All other systems reviewed and are negative.    PFSH:  Patient Active Problem List   Diagnosis   • Atrial fibrillation with RVR    • Hypothyroidism   • Hypokalemia   •  Uterine leiomyoma   • Basal cell carcinoma   • Nonsustained ventricular tachycardia versus aberrantly conducted atrial fibrillation       No current facility-administered medications on file prior to encounter.      Current Outpatient Medications on File Prior to Encounter   Medication Sig Dispense Refill   • Biotin (BIOTIN 5000) 5 MG capsule Take  by mouth.     • Multiple Vitamins-Minerals (MULTIVITAMIN ADULT PO) Take  by mouth.     • progesterone (PROMETRIUM) 100 MG capsule Take 600 mg by mouth Daily.     • Thyroid 60 MG PO tablet Take 60 mg by mouth Daily.       Allergies   Allergen Reactions   • Penicillins Hives   • Percocet [Oxycodone-Acetaminophen] Rash       Social History     Socioeconomic History   • Marital status:      Spouse name: Not on file   • Number of children: Not on file   • Years of education: Not on file   • Highest education level: Not on file   Tobacco Use   • Smoking status: Never Smoker   • Smokeless tobacco: Never Used     History reviewed. No pertinent family history.         Objective:     Vital Sign Min/Max for last 24 hours  Temp  Min: 98.1 °F (36.7 °C)  Max: 98.5 °F (36.9 °C)   BP  Min: 83/69  Max: 155/94   Pulse  Min: 62  Max: 274   Resp  Min: 16  Max: 20   SpO2  Min: 91 %  Max: 100 %   No data recorded      Intake/Output Summary (Last 24 hours) at 3/17/2020 1426  Last data filed at 3/17/2020 1200  Gross per 24 hour   Intake 2569.12 ml   Output 1900 ml   Net 669.12 ml           Vitals:    03/17/20 1400   BP: 132/73   Pulse: 80   Resp:    Temp:    SpO2: 100%       CONSTITUTIONAL: Well-nourished. In no acute distress.   SKIN: Warm and dry. No rashes noted  HEENT: Head is normocephalic and atraumatic. Mucous membranes are pink and moist.   NECK: Supple without masses or thyromegaly.   LUNGS: Normal effort. Clear to auscultation bilaterally without wheezing, rhonchi, or rales noted.   CARDIOVASCULAR: The heart has a regular rate and rhythm with a normal S1 and S2. There is no  murmur, gallop, rub, or click appreciated.   PERIPHERAL VASCULAR: Carotid upstroke is 2+ bilaterally and without bruits. Radial pulses are 2+ bilaterally. DP pulses are 2+ and symmetrical. There is no lower extremity edema.   ABDOMEN: Normal bowel sounds.  Soft with no tenderness with palpitation. No hepatosplenomegaly  MUSCULOSKELETAL:  No digital cyanosis  NEUROLOGICAL: Nonfocal.  PSYCHIATRIC: Alert, orientated x 3, appropriate affect     Labs:  Lab Results   Component Value Date    TROPONINT <0.010 03/17/2020       Glucose   Date Value Ref Range Status   03/17/2020 122 (H) 65 - 99 mg/dL Final     BUN   Date Value Ref Range Status   03/17/2020 11 6 - 20 mg/dL Final     Creatinine   Date Value Ref Range Status   03/17/2020 0.72 0.57 - 1.00 mg/dL Final     Sodium   Date Value Ref Range Status   03/17/2020 138 136 - 145 mmol/L Final     Potassium   Date Value Ref Range Status   03/17/2020 4.4 3.5 - 5.2 mmol/L Final     Chloride   Date Value Ref Range Status   03/17/2020 107 98 - 107 mmol/L Final     CO2   Date Value Ref Range Status   03/17/2020 20.0 (L) 22.0 - 29.0 mmol/L Final     Calcium   Date Value Ref Range Status   03/17/2020 8.0 (L) 8.6 - 10.5 mg/dL Final     Total Protein   Date Value Ref Range Status   03/16/2020 7.5 6.0 - 8.5 g/dL Final     Albumin   Date Value Ref Range Status   03/16/2020 4.70 3.50 - 5.20 g/dL Final     ALT (SGPT)   Date Value Ref Range Status   03/16/2020 11 1 - 33 U/L Final     AST (SGOT)   Date Value Ref Range Status   03/16/2020 15 1 - 32 U/L Final     Alkaline Phosphatase   Date Value Ref Range Status   03/16/2020 63 39 - 117 U/L Final     Total Bilirubin   Date Value Ref Range Status   03/16/2020 0.4 0.2 - 1.2 mg/dL Final     eGFR Non  Amer   Date Value Ref Range Status   03/17/2020 86 >60 mL/min/1.73 Final     BUN/Creatinine Ratio   Date Value Ref Range Status   03/17/2020 15.3 7.0 - 25.0 Final     Anion Gap   Date Value Ref Range Status   03/17/2020 11.0 5.0 - 15.0 mmol/L  Final       No results found for: CHOL  No results found for: TRIG  No results found for: HDL  No results found for: LDL  No components found for: LDLDIRECTC      WBC   Date Value Ref Range Status   03/17/2020 12.53 (H) 3.40 - 10.80 10*3/mm3 Final     RBC   Date Value Ref Range Status   03/17/2020 4.40 3.77 - 5.28 10*6/mm3 Final     Hemoglobin   Date Value Ref Range Status   03/17/2020 12.1 12.0 - 15.9 g/dL Final     Hematocrit   Date Value Ref Range Status   03/17/2020 37.9 34.0 - 46.6 % Final     MCV   Date Value Ref Range Status   03/17/2020 86.1 79.0 - 97.0 fL Final     MCH   Date Value Ref Range Status   03/17/2020 27.5 26.6 - 33.0 pg Final     MCHC   Date Value Ref Range Status   03/17/2020 31.9 31.5 - 35.7 g/dL Final     RDW   Date Value Ref Range Status   03/17/2020 12.3 12.3 - 15.4 % Final     RDW-SD   Date Value Ref Range Status   03/17/2020 38.7 37.0 - 54.0 fl Final     MPV   Date Value Ref Range Status   03/17/2020 10.5 6.0 - 12.0 fL Final     Platelets   Date Value Ref Range Status   03/17/2020 266 140 - 450 10*3/mm3 Final     Neutrophil %   Date Value Ref Range Status   03/16/2020 49.2 42.7 - 76.0 % Final     Lymphocyte %   Date Value Ref Range Status   03/16/2020 41.5 19.6 - 45.3 % Final     Monocyte %   Date Value Ref Range Status   03/16/2020 6.9 5.0 - 12.0 % Final     Eosinophil %   Date Value Ref Range Status   03/16/2020 1.7 0.3 - 6.2 % Final     Basophil %   Date Value Ref Range Status   03/16/2020 0.5 0.0 - 1.5 % Final     Immature Grans %   Date Value Ref Range Status   03/16/2020 0.2 0.0 - 0.5 % Final     Neutrophils, Absolute   Date Value Ref Range Status   03/16/2020 7.28 (H) 1.70 - 7.00 10*3/mm3 Final     Lymphocytes, Absolute   Date Value Ref Range Status   03/16/2020 6.16 (H) 0.70 - 3.10 10*3/mm3 Final     Monocytes, Absolute   Date Value Ref Range Status   03/16/2020 1.03 (H) 0.10 - 0.90 10*3/mm3 Final     Eosinophils, Absolute   Date Value Ref Range Status   03/16/2020 0.25 0.00 - 0.40  10*3/mm3 Final     Basophils, Absolute   Date Value Ref Range Status   03/16/2020 0.08 0.00 - 0.20 10*3/mm3 Final     Immature Grans, Absolute   Date Value Ref Range Status   03/16/2020 0.03 0.00 - 0.05 10*3/mm3 Final     nRBC   Date Value Ref Range Status   03/16/2020 0.0 0.0 - 0.2 /100 WBC Final         Diagnostic Data:    EKG: Atrial fibrillation with RVR    Tele: Normal sinus rhythm         Assessment and Plan:   ASSESSMENT:  1. Paroxysmal atrial fibrillation in the setting of recent use of Armor Thyroid followed by discontinuation and now an elevated TSH, hypokalemia, increased emotional/work stress, family history of AFib (both parents.  a. Prior history of significant palpitations beginning in 10/2016.  b. PWQIC1DUDi= 1  c. ECV x2 with brief return to normal sinus rhythm.  Converted to normal sinus rhythm on amiodarone.  d. Maintaining normal sinus rhythm on metoprolol and amiodarone.  2. Possible Hypothyroidism  a. Has previously been on thyroid supplementation as part of a weight loss program.  Has not had prior elevated TSH.  Reports stopping Otto Thyroid approximately 4 days ago.  b. TSH 7.790  3. Hypokalemia  a. Improved with replacement.    PLAN:  1. Discontinue amiodarone.   2. Begin flecainide 50 mg po BID. Will need repeat EKG on Friday as an outpatient.  3. Decrease metoprolol to 25 mg po BID.  4. Continue Eliquis for at least one month.  5. Primary team to address possible hypothyroidism.  6. Possibly home tomorrow from a cardiac standpoint with EKG Friday and in the future, follow up in EP clinic    Scribed for Dr. Howard by Radha Mejias, QUIN. 3/17/2020  16:12     I, Sulaiman Howard MD, personally performed the services as scribed by the above named individual. I have made any necessary edits and it is both accurate and complete.     Sulaiman Howard MD, MSc, [facility]  Interventional Cardiology  [facility] at [facility]

## 2020-03-17 NOTE — PROGRESS NOTES
Discharge Planning Assessment  [facility]     Patient Name: Sumi Garay  MRN: [id number]  Today's Date: 3/17/2020    Admit Date: 3/16/2020    Discharge Needs Assessment     Row Name 03/17/20 1022       Living Environment    Lives With  spouse    Name(s) of Who Lives With Patient  Lives in 1 level home in [place] c her spouse.    Current Living Arrangements  home/apartment/condo    Primary Care Provided by  self    Provides Primary Care For  no one    Family Caregiver if Needed  spouse    Quality of Family Relationships  supportive    Able to Return to Prior Arrangements  yes       Resource/Environmental Concerns    Resource/Environmental Concerns  none    Transportation Concerns  car, none       Transition Planning    Patient/Family Anticipates Transition to  home with family    Patient/Family Anticipated Services at Transition  none    Transportation Anticipated  family or friend will provide       Discharge Needs Assessment    Readmission Within the Last 30 Days  no previous admission in last 30 days    Concerns to be Addressed  denies needs/concerns at this time;adjustment to diagnosis/illness    Equipment Currently Used at Home  none        Discharge Plan     Row Name 03/17/20 1023       Plan    Plan  Home    Patient/Family in Agreement with Plan  yes    Plan Comments  Talked with Mrs. Garay and her spouse at .  She is independent c ADL's.  She works FT.  No DME in home.  Her PCP is Dr. Rut Khanna.  She gets her Rx filled at [facility] on [address]  Her insurance covers most of cost of her medications.  She is able to pay copay s difficulty.  Her goal is to d/c to home acc. by spouse.      Final Discharge Disposition Code  01 - home or self-care    Row Name 03/17/20 0914       Plan    Final Discharge Disposition Code  01 - home or self-care        Destination      Coordination has not been started for this encounter.      Durable Medical Equipment      Coordination has not been started for  this encounter.      Dialysis/Infusion      Coordination has not been started for this encounter.      Home Medical Care      Coordination has not been started for this encounter.      Therapy      Coordination has not been started for this encounter.      Community Resources      Coordination has not been started for this encounter.          Demographic Summary     Row Name 03/17/20 0744       General Information    Admission Type  inpatient    Arrived From  emergency department    Referral Source  admission list    Reason for Consult  discharge planning    Preferred Language  English    General Information Comments  Her PCP is Dr. Rut Khanna.       Contact Information    Permission Granted to Share Info With          Functional Status     Row Name 03/17/20 1021       Functional Status    Usual Activity Tolerance  excellent    Current Activity Tolerance  good       Functional Status, IADL    Medications  independent    Meal Preparation  independent    Housekeeping  independent    Laundry  independent    Shopping  independent       Mental Status Summary    Recent Changes in Mental Status/Cognitive Functioning  no changes       Employment/    Employment Status  employed full time    Current or Previous Occupation  healthcare        Psychosocial    No documentation.       Abuse/Neglect    No documentation.       Legal    No documentation.       Substance Abuse    No documentation.       Patient Forms    No documentation.           Abel Boothe RN

## 2020-03-17 NOTE — PLAN OF CARE
Problem: Patient Care Overview  Goal: Plan of Care Review  Outcome: Ongoing (interventions implemented as appropriate)  Flowsheets (Taken 3/17/2020 0716)  Progress: improving  Plan of Care Reviewed With: patient  Note:   Pt arrived from the ED with A. Fib RVR after 2 unsuccessful ECV attempts in the ED. Amio gtt maintained. At 0226 pts HR jumped to the 220s and another amio bolus was given which almost immediately brought pts HR back to 120-130s, pt then converted to NSR at 0300. Amio gtt dose decreased to 0.5 at 0600.  Denies CP. Mild nausea which later resolved. New IV placed. VSS. Afebrile.

## 2020-03-17 NOTE — PROGRESS NOTES
INTENSIVIST / PULMONARY FOLLOW UP NOTE     Hospital:  LOS: 0 days   Ms. Sumi Garay, 49 y.o. female is followed for:     Atrial fibrillation with RVR     Hypothyroidism    Hypokalemia    Nonsustained ventricular tachycardia versus aberrantly conducted atrial fibrillation          SUBJECTIVE   In NSR this am on amio    The patient's relevant past medical, surgical, family, and social history were reviewed    Allergies and medications were reviewed    ROS:  Per subjective, all other systems were reviewed and were negative        OBJECTIVE     Vital Sign Min/Max for last 24 hours:  Temp  Min: 98.1 °F (36.7 °C)  Max: 98.5 °F (36.9 °C)   BP  Min: 83/69  Max: 155/94   Pulse  Min: 62  Max: 274   Resp  Min: 16  Max: 20   SpO2  Min: 91 %  Max: 100 %   No data recorded     Physical Exam:  General Appearance:  Conversant, in no acute distress  Eyes:  No scleral icterus or pallor, pupils normal  Ears, Nose, Mouth, Throat:  Atraumatic, oropharynx clear  Neck:  Trachea midline, thyroid normal  Respiratory:  Clear to auscultation bilaterally, normal effort, no tenderness to palpation  Cardiovascular:  Regular rate and rhythm, no murmurs, no peripheral edema, no thrill  Gastrointestinal:  Soft, non-tender, non-distended, no hepatosplenomegaly  Skin:  Normal temperature, no rash  Psychiatric:  Alert and oriented x 3, normal judgement and insight  Neuro:  No new focal neurologic deficits observed    Telemetry:              Hemodynamics:   CVP:     PAP:     PAOP:     CO:     CI:     SVI:     SVR:       SpO2: 98 % SpO2  Min: 91 %  Max: 100 %   Device:      Flow Rate:   No data recorded     Mechanical Ventilator Settings:                                         Intake/Ouptut 24 hrs (7:00AM - 6:59 AM)  Intake & Output (last 3 days)       03/14 0701 - 03/15 0700 03/15 0701 - 03/16 0700 03/16 0701 - 03/17 0700 03/17 0701 - 03/18 0700    I.V. (mL/kg)   427.4 (5.4) 90.7 (1.1)    IV Piggyback   2051     Total Intake(mL/kg)   2478.4  (31.1) 90.7 (1.1)    Urine (mL/kg/hr)   1200 700 (1.5)    Total Output   1200 700    Net   +1278.4 -609.3            Urine Unmeasured Occurrence    1 x          Lines, Drains & Airways    Active LDAs     Name:   Placement date:   Placement time:   Site:   Days:    Peripheral IV 03/16/20 2237 Right Forearm   03/16/20 2237    Forearm   less than 1    Peripheral IV 03/17/20 0200 Left Forearm   03/17/20  (Pended)     0200  (Pended)     Forearm  (Pended)    less than 1                Hematology:  Results from last 7 days   Lab Units 03/17/20  0405 03/16/20  2200   WBC 10*3/mm3 12.53* 14.83*   HEMOGLOBIN g/dL 12.1 13.8   HEMATOCRIT % 37.9 41.7   PLATELETS 10*3/mm3 266 334     Electrolytes, Magnesium and Phosphorus:  Results from last 7 days   Lab Units 03/17/20  0405 03/16/20  2200   SODIUM mmol/L 138 139   CHLORIDE mmol/L 107 100   POTASSIUM mmol/L 4.4 2.9*   CO2 mmol/L 20.0* 22.0   MAGNESIUM mg/dL 2.2 2.0   PHOSPHORUS mg/dL 2.8  --      Renal:  Results from last 7 days   Lab Units 03/17/20  0405 03/16/20  2200   CREATININE mg/dL 0.72 0.92   BUN mg/dL 11 12     Estimated Creatinine Clearance: 120.2 mL/min (by C-G formula based on SCr of 0.72 mg/dL).  Hepatic:  Results from last 7 days   Lab Units 03/16/20  2200   ALK PHOS U/L 63   BILIRUBIN mg/dL 0.4   ALT (SGPT) U/L 11   AST (SGOT) U/L 15     Arterial Blood Gases:              No results found for: LACTATE    Relevant imaging studies and labs from 03/17/20 were reviewed and interpreted by me    Medications (drips):    amiodarone Last Rate: 0.5 mg/min (03/17/20 1140)         amiodarone 150 mg Intravenous Once   apixaban 5 mg Oral Q12H   metoprolol tartrate 25 mg Oral Q6H       Assessment/Plan   IMPRESSION / PLAN     Inpatient Problem List:  49 y.o.female:  Active Hospital Problems    Diagnosis   • **Atrial fibrillation with RVR    • Nonsustained ventricular tachycardia versus aberrantly conducted atrial fibrillation   • Hypothyroidism   • Hypokalemia     "    Impression:  49 y.o.female with relevant PMH of likely prior PAF, ?hypothyroidism - on a \"cocktail\" of hormone replacement therapies from a [facility] clinic admitted 3/16/2020 w/ Afib w/ RVR, converted to NSR with amiodarone.  Has a strong family history of Afib.    Plan:  PAF - continue amio for now, add metoprolol and Eliquis.  Cards consult pending.    Resume / continue appropriate home meds    Replace lytes prn    Nutrition - Diet Regular; Cardiac    Plan of care and goals reviewed with mulitdisciplinary team at daily rounds         Avtar Currie MD  Intensive Care Medicine  03/17/20 12:45       "

## 2020-03-17 NOTE — PROGRESS NOTES
"Clinical Nutrition Note      Patient Name: Sumi Garay  MRN: [id number]  Admission date: 3/16/2020      Multidisciplinary Rounds    Additional information obtained during MDR:  RN reports pt adm form ED for a fib w/ RVR - rate in 200's,  s/p amio bolus, now on drip. Pt w/ strong family hx of PAF. 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    Current diet: Diet Regular; Cardiac    Oral Nutrition Supplement:     Pertinent medical data reviewed:  No nutrition risk identified on nursing screen; MST score \"0\"    Intervention:  Menu provided, advised of alternate selections, menu adjusted to pt's request.  Plan of care and goals reviewed    Monitor:  RD to follow per protocol      Betina Monson MS,RD,LD  03/17/20 1:40 PM  Time: 15  mins       "

## 2020-03-17 NOTE — ED PROVIDER NOTES
"Subjective   49-year-old female presents for evaluation of \"palpitations.\"  Of note, the patient is otherwise healthy.  She states that over the past few year she has had few episodes of brief palpitations but has never formally been diagnosed with any arrhythmias.  Tonight, approximately 20 minutes before coming to the emergency department the patient was at home in her kitchen when she had acute onset of rapid heart rate and palpitations.  Upon EMS arrival, the patient was markedly tachycardic with an irregularly irregular rhythm consistent with atrial fibrillation with rapid ventricular response.  She was subsequently brought to the ED.  The patient endorses accompanying shortness of breath and dizziness.  She is unsure as to what may have triggered her symptoms.  No family history of sudden cardiac death.      History provided by:  Patient and EMS personnel  Palpitations   Palpitations quality:  Irregular  Onset quality:  Sudden  Timing:  Constant  Progression:  Unchanged  Chronicity:  New  Associated symptoms: dizziness and shortness of breath    Associated symptoms: no cough        Review of Systems   Constitutional: Negative for fever.   Respiratory: Positive for chest tightness and shortness of breath. Negative for cough.    Cardiovascular: Positive for palpitations.   Neurological: Positive for dizziness.   All other systems reviewed and are negative.      No past medical history on file.    Allergies   Allergen Reactions   • Penicillins Hives   • Percocet [Oxycodone-Acetaminophen] Rash       No past surgical history on file.    No family history on file.    Social History     Socioeconomic History   • Marital status:      Spouse name: Not on file   • Number of children: Not on file   • Years of education: Not on file   • Highest education level: Not on file         Objective   Physical Exam   Constitutional: She is oriented to person, place, and time. She appears well-developed and well-nourished. "   Nontoxic-appearing female, anxious   HENT:   Head: Normocephalic and atraumatic.   Mouth/Throat: Oropharynx is clear and moist.   Eyes: Pupils are equal, round, and reactive to light. EOM are normal.   Neck: Normal range of motion. Neck supple.   Cardiovascular: Normal heart sounds. Exam reveals no gallop and no friction rub.   No murmur heard.  Irregularly irregular tachycardia   Pulmonary/Chest: Effort normal and breath sounds normal. No respiratory distress. She has no wheezes. She has no rales.   Abdominal: Soft. Bowel sounds are normal. She exhibits no distension and no mass. There is no tenderness. There is no rebound and no guarding.   Musculoskeletal: Normal range of motion.   Neurological: She is alert and oriented to person, place, and time.   Skin: Skin is warm and dry. No rash noted. She is not diaphoretic. No erythema.   Psychiatric: Judgment and thought content normal.   Anxious   Nursing note and vitals reviewed.      Electrical Cardioversion  Date/Time: 3/16/2020 10:01 PM  Performed by: Yair Oliva MD  Authorized by: Yair Oliva MD     Consent:     Consent obtained:  Verbal and written    Consent given by:  Patient    Risks discussed:  Pain, cutaneous burn, death and induced arrhythmia  Pre-procedure details:     Cardioversion basis:  Elective    Rhythm:  Atrial fibrillation    Electrode placement:  Anterior-posterior  Attempt one:     Cardioversion mode:  Synchronous    Waveform:  Biphasic    Shock (Joules):  150    Shock outcome:  Conversion to normal sinus rhythm (transiently)  Attempt two:     Cardioversion mode:  Synchronous    Waveform:  Biphasic    Shock (Joules):  150    Shock outcome:  Conversion to normal sinus rhythm (transiently)  Post-procedure details:     Patient status:  Alert    Patient tolerance of procedure:  Tolerated well, no immediate complications  Procedural Sedation  Date/Time: 3/17/2020 10:01 PM  Performed by: Yair Oliva MD  Authorized by: Pj  Yair Hilario MD     Consent:     Consent obtained:  Verbal and written    Consent given by:  Patient    Risks discussed:  Respiratory compromise necessitating ventilatory assistance and intubation, vomiting, prolonged hypoxia resulting in organ damage, allergic reaction, dysrhythmia, inadequate sedation and nausea  Indications:     Procedure performed:  Cardioversion    Procedure necessitating sedation performed by:  Physician performing sedation    Intended level of sedation:  Moderate (conscious sedation)  Pre-sedation assessment:     Time since last food or drink:  2 hours    ASA classification: class 1 - normal, healthy patient      Neck mobility: normal      Mallampati score:  I - soft palate, uvula, fauces, pillars visible    Pre-sedation assessments completed and reviewed: airway patency, cardiovascular function, hydration status, mental status, nausea/vomiting, pain level, respiratory function and temperature      Pre-sedation assessment completed:  3/17/2020 9:50 PM  Immediate pre-procedure details:     Reassessment: Patient reassessed immediately prior to procedure      Reviewed: vital signs, relevant labs/tests and NPO status      Verified: bag valve mask available, emergency equipment available, intubation equipment available, IV patency confirmed, oxygen available and suction available    Procedure details (see MAR for exact dosages):     Sedation start time:  3/17/2020 10:01 PM    Preoxygenation:  Nasal cannula and nonrebreather mask    Sedation:  Propofol    Intra-procedure monitoring:  Blood pressure monitoring, cardiac monitor, continuous pulse oximetry, frequent LOC assessments and frequent vital sign checks    Intra-procedure events: none      Sedation end time:  3/17/2020 10:14 PM    Total sedation time (minutes):  13  Post-procedure details:     Post-sedation assessment completed:  3/17/2020 10:16 PM    Attendance: Constant attendance by certified staff until patient recovered      Recovery:  Patient returned to pre-procedure baseline      Post-sedation assessments completed and reviewed: airway patency, cardiovascular function, hydration status, mental status, nausea/vomiting, pain level and respiratory function      Specimens recovered:  None    Patient is stable for discharge or admission: yes      Patient tolerance:  Tolerated well, no immediate complications  Procedural Sedation  Date/Time: 3/17/2020 10:20 PM  Performed by: Yair Oliva MD  Authorized by: Yair Oliva MD     Consent:     Consent obtained:  Verbal and written    Consent given by:  Patient    Risks discussed:  Prolonged hypoxia resulting in organ damage, respiratory compromise necessitating ventilatory assistance and intubation, vomiting, allergic reaction, dysrhythmia, inadequate sedation and nausea  Indications:     Procedure performed:  Cardioversion    Procedure necessitating sedation performed by:  Physician performing sedation    Intended level of sedation:  Moderate (conscious sedation)  Pre-sedation assessment:     Time since last food or drink:  2 hours    ASA classification: class 1 - normal, healthy patient      Neck mobility: normal      Mallampati score:  I - soft palate, uvula, fauces, pillars visible    Pre-sedation assessments completed and reviewed: airway patency, cardiovascular function, hydration status, mental status, nausea/vomiting, pain level, respiratory function and temperature      Pre-sedation assessment completed:  3/17/2020 10:10 PM  Immediate pre-procedure details:     Reassessment: Patient reassessed immediately prior to procedure      Reviewed: vital signs, relevant labs/tests and NPO status      Verified: bag valve mask available, emergency equipment available, intubation equipment available, IV patency confirmed, oxygen available and suction available    Procedure details (see MAR for exact dosages):     Sedation start time:  3/17/2020 10:20 PM    Preoxygenation:  Nasal cannula and  nonrebreather mask    Sedation:  Propofol    Intra-procedure monitoring:  Blood pressure monitoring, cardiac monitor, continuous pulse oximetry, frequent LOC assessments and frequent vital sign checks    Intra-procedure events: none      Sedation end time:  3/17/2020 10:32 PM    Total sedation time (minutes):  12  Post-procedure details:     Post-sedation assessment completed:  3/17/2020 10:34 PM    Attendance: Constant attendance by certified staff until patient recovered      Recovery: Patient returned to pre-procedure baseline      Post-sedation assessments completed and reviewed: airway patency, cardiovascular function, hydration status, mental status, nausea/vomiting, pain level and respiratory function      Specimens recovered:  None    Patient is stable for discharge or admission: yes      Patient tolerance:  Tolerated well, no immediate complications  Critical Care  Performed by: Yair Oliva MD  Authorized by: Yair Oliva MD     Critical care provider statement:     Critical care time (minutes):  40    Critical care time was exclusive of:  Separately billable procedures and treating other patients    Critical care was necessary to treat or prevent imminent or life-threatening deterioration of the following conditions:  Cardiac failure    Critical care was time spent personally by me on the following activities:  Development of treatment plan with patient or surrogate, evaluation of patient's response to treatment, examination of patient, interpretation of cardiac output measurements, obtaining history from patient or surrogate, ordering and performing treatments and interventions, ordering and review of laboratory studies, pulse oximetry and re-evaluation of patient's condition    I assumed direction of critical care for this patient from another provider in my specialty: no               ED Course  ED Course as of Mar 17 0417   Mon Mar 16, 2020   1837 Dr. Oliva pageharley the intensivist.     [DD]   0022 Dr. Oliva discussed the case with intensivist.    [DD]   Tue Mar 17, 2020   0000 49-year-old female presents for evaluation of heart palpitations and rapid heart rate.  The patient is a nurse and a very good historian.  Her symptoms started approximately 20 minutes before coming to the emergency department.  On EMS arrival to the patient's home, she was markedly tachycardic with irregularly irregular narrow complex rhythm consistent with atrial fibrillation with rapid ventricular response.  On arrival to the ED, EKG revealed atrial fibrillation with rapid ventricular response and heart rates ranging from approximately 175-210.  No family history of sudden cardiac death.  The patient is an excellent historian with a clear time of onset and an excellent candidate for electrical cardioversion.  After obtaining informed consent, the patient was sedated with propofol and electrically cardioverted at 150 J.  She transiently returned to normal sinus rhythm for approximately 3 minutes.  Her repeat EKG revealed normal sinus rhythm with normal UT and QT intervals and no obvious delta wave.  No EKG evidence of Brugada syndrome.  No family history of sudden cardiac death.  The patient then returned to atrial fibrillation with rapid ventricular response and was once again electrically cardioverted at 150 J with transient success for approximately 1 minute.  Labs remarkable for potassium of 2.9.  Potassium replaced both orally and intravenously.  Magnesium given as well.  Following her second cardioversion, the patient had a few visible runs of sustained ventricular tachycardia lasting for seconds at a time with accompanying dizziness and was symptomatic.  At this point, I felt that she warranted admission to the ICU for close observation.  Diltiazem initiated for rate control.  I discussed the patient's case with Dr. Garibay, and the patient will be admitted to the ICU under his care.  She is aware/agreeable with the  plan at this time.    [DD]   0031     [DD]   0031 [id number] score of 1.    [DD]      ED Course User Index  [DD] Yair Oliva MD     No results found for this or any previous visit (from the past 24 hour(s)).  Note: In addition to lab results from this visit, the labs listed above may include labs taken at another facility or during a different encounter within the last 24 hours. Please correlate lab times with ED admission and discharge times for further clarification of the services performed during this visit.    XR Chest 1 View    (Results Pending)     Vitals:    03/16/20 2208 03/16/20 2209 03/16/20 2210 03/16/20 2211   BP:   100/54    Patient Position:       Pulse: (!) 158 (!) 135 (!) 137 (!) 140   Resp:       SpO2: 100% 100% 94% 100%   Weight:       Height:         Medications   sodium chloride 0.9 % flush 10 mL (has no administration in time range)   Propofol (DIPRIVAN) injection 100 mg (has no administration in time range)   Propofol (DIPRIVAN) injection (20 mg Intravenous Given 3/16/20 2207)   Propofol (DIPRIVAN) injection (50 mg Intravenous Given 3/16/20 2204)     ECG/EMG Results (last 24 hours)     Procedure Component Value Units Date/Time    ECG 12 Lead [[id number]] Collected:  03/16/20 2153     Updated:  03/16/20 2153    ECG 12 Lead [[id number]] Collected:  03/16/20 2213     Updated:  03/16/20 2213        ECG 12 Lead         ECG 12 Lead                                                Recent Results (from the past 24 hour(s))   Comprehensive Metabolic Panel    Collection Time: 03/16/20 10:00 PM   Result Value Ref Range    Glucose 155 (H) 65 - 99 mg/dL    BUN 12 6 - 20 mg/dL    Creatinine 0.92 0.57 - 1.00 mg/dL    Sodium 139 136 - 145 mmol/L    Potassium 2.9 (L) 3.5 - 5.2 mmol/L    Chloride 100 98 - 107 mmol/L    CO2 22.0 22.0 - 29.0 mmol/L    Calcium 9.3 8.6 - 10.5 mg/dL    Total Protein 7.5 6.0 - 8.5 g/dL    Albumin 4.70 3.50 - 5.20 g/dL    ALT (SGPT) 11 1 - 33 U/L    AST (SGOT) 15 1 - 32 U/L     Alkaline Phosphatase 63 39 - 117 U/L    Total Bilirubin 0.4 0.2 - 1.2 mg/dL    eGFR Non African Amer 65 >60 mL/min/1.73    Globulin 2.8 gm/dL    A/G Ratio 1.7 g/dL    BUN/Creatinine Ratio 13.0 7.0 - 25.0    Anion Gap 17.0 (H) 5.0 - 15.0 mmol/L   Magnesium    Collection Time: 03/16/20 10:00 PM   Result Value Ref Range    Magnesium 2.0 1.6 - 2.6 mg/dL   Troponin    Collection Time: 03/16/20 10:00 PM   Result Value Ref Range    Troponin T <0.010 0.000 - 0.030 ng/mL   TSH    Collection Time: 03/16/20 10:00 PM   Result Value Ref Range    TSH 7.790 (H) 0.270 - 4.200 uIU/mL   BNP    Collection Time: 03/16/20 10:00 PM   Result Value Ref Range    proBNP 247.2 5.0 - 450.0 pg/mL   Light Blue Top    Collection Time: 03/16/20 10:00 PM   Result Value Ref Range    Extra Tube hold for add-on    Green Top (Gel)    Collection Time: 03/16/20 10:00 PM   Result Value Ref Range    Extra Tube Hold for add-ons.    Lavender Top    Collection Time: 03/16/20 10:00 PM   Result Value Ref Range    Extra Tube hold for add-on    Gold Top - SST    Collection Time: 03/16/20 10:00 PM   Result Value Ref Range    Extra Tube Hold for add-ons.    CBC Auto Differential    Collection Time: 03/16/20 10:00 PM   Result Value Ref Range    WBC 14.83 (H) 3.40 - 10.80 10*3/mm3    RBC 4.85 3.77 - 5.28 10*6/mm3    Hemoglobin 13.8 12.0 - 15.9 g/dL    Hematocrit 41.7 34.0 - 46.6 %    MCV 86.0 79.0 - 97.0 fL    MCH 28.5 26.6 - 33.0 pg    MCHC 33.1 31.5 - 35.7 g/dL    RDW 12.2 (L) 12.3 - 15.4 %    RDW-SD 38.4 37.0 - 54.0 fl    MPV 10.9 6.0 - 12.0 fL    Platelets 334 140 - 450 10*3/mm3    Neutrophil % 49.2 42.7 - 76.0 %    Lymphocyte % 41.5 19.6 - 45.3 %    Monocyte % 6.9 5.0 - 12.0 %    Eosinophil % 1.7 0.3 - 6.2 %    Basophil % 0.5 0.0 - 1.5 %    Immature Grans % 0.2 0.0 - 0.5 %    Neutrophils, Absolute 7.28 (H) 1.70 - 7.00 10*3/mm3    Lymphocytes, Absolute 6.16 (H) 0.70 - 3.10 10*3/mm3    Monocytes, Absolute 1.03 (H) 0.10 - 0.90 10*3/mm3    Eosinophils, Absolute 0.25  0.00 - 0.40 10*3/mm3    Basophils, Absolute 0.08 0.00 - 0.20 10*3/mm3    Immature Grans, Absolute 0.03 0.00 - 0.05 10*3/mm3    nRBC 0.0 0.0 - 0.2 /100 WBC   T4, Free    Collection Time: 03/16/20 10:00 PM   Result Value Ref Range    Free T4 1.29 0.93 - 1.70 ng/dL   Urine Drug Screen - Urine, Clean Catch    Collection Time: 03/17/20  1:00 AM   Result Value Ref Range    THC, Screen, Urine Negative Negative    Phencyclidine (PCP), Urine Negative Negative    Cocaine Screen, Urine Negative Negative    Methamphetamine, Ur Negative Negative    Opiate Screen Negative Negative    Amphetamine Screen, Urine Negative Negative    Benzodiazepine Screen, Urine Negative Negative    Tricyclic Antidepressants Screen Negative Negative    Methadone Screen, Urine Negative Negative    Barbiturates Screen, Urine Negative Negative    Oxycodone Screen, Urine Negative Negative    Propoxyphene Screen Negative Negative    Buprenorphine, Screen, Urine Negative Negative     Note: In addition to lab results from this visit, the labs listed above may include labs taken at another facility or during a different encounter within the last 24 hours. Please correlate lab times with ED admission and discharge times for further clarification of the services performed during this visit.    XR Chest 1 View   Final Result   No acute cardiopulmonary findings.      Signer Name: Bandar Rock MD    Signed: 3/16/2020 10:34 PM    Workstation Name: [id number]     [facility]        Vitals:    03/17/20 0230 03/17/20 0231 03/17/20 0245 03/17/20 0300   BP: 125/67  (P) 104/79 (P) 100/60   BP Location:       Patient Position:       Pulse: (!) 138 (!) 130 (!) (P) 149 (!) (P) 129   Resp:       Temp:       TempSrc:       SpO2: 98% 98% (P) 100% (P) 97%   Weight:       Height:         Medications   sodium chloride 0.9 % flush 10 mL (has no administration in time range)   Propofol (DIPRIVAN) injection 100 mg (has no administration in time range)    Propofol (DIPRIVAN) injection 100 mg (has no administration in time range)   amiodarone in dextrose 5% (NEXTERONE) loading dose 150mg/100mL (0 mg Intravenous Stopped 3/16/20 2343)     Followed by   amiodarone (NEXTERONE) 360 mg/200 mL (1.8 mg/mL) infusion (1 mg/min Intravenous New Bag 3/17/20 0240)     Followed by   amiodarone (NEXTERONE) 360 mg/200 mL (1.8 mg/mL) infusion (has no administration in time range)   potassium chloride (MICRO-K) CR capsule 40 mEq (40 mEq Oral Given 3/17/20 0101)     Or   potassium chloride (KLOR-CON) packet 40 mEq ( Oral Not Given:  See Alt 3/17/20 0101)     Or   potassium chloride 10 mEq in 100 mL IVPB ( Intravenous Not Given:  See Alt 3/17/20 0101)   heparin (porcine) 5000 UNIT/ML injection 5,000 Units (5,000 Units Subcutaneous Given 3/17/20 0041)   ondansetron (ZOFRAN) tablet 4 mg (has no administration in time range)   ALPRAZolam (XANAX) tablet 0.25 mg (0.25 mg Oral Given 3/17/20 0041)   amiodarone in dextrose 5% (NEXTERONE) loading dose 150mg/100mL ( Intravenous Canceled Entry 3/17/20 0243)   Propofol (DIPRIVAN) injection (50 mg Intravenous Given 3/16/20 2204)   Propofol (DIPRIVAN) injection (20 mg Intravenous Given 3/16/20 2207)   Propofol (DIPRIVAN) injection (20 mg Intravenous Given 3/16/20 2223)   sodium chloride 0.9 % bolus 2,000 mL (0 mL Intravenous Stopped 3/16/20 2332)   magnesium sulfate 2g/50 mL (PREMIX) infusion (2 g Intravenous New Bag 3/17/20 0041)   potassium chloride (MICRO-K) CR capsule 20 mEq (20 mEq Oral Given 3/16/20 2334)   potassium chloride 10 mEq in 100 mL IVPB (0 mEq Intravenous Stopped 3/17/20 0020)     ECG/EMG Results (last 24 hours)     Procedure Component Value Units Date/Time    ECG 12 Lead [[id number]] Collected:  03/16/20 2153     Updated:  03/16/20 2153    ECG 12 Lead [[id number]] Collected:  03/16/20 2213     Updated:  03/16/20 2213    ECG 12 Lead [[id number]] Collected:  03/16/20 2228     Updated:  03/16/20 2228        ECG 12 Lead         ECG 12 Lead          ECG 12 Lead                 MDM  Number of Diagnoses or Management Options  Critical Care  Total time providing critical care: 30-74 minutes      Final diagnoses:   Atrial fibrillation with RVR (CMS/HCC)   Hypokalemia       Documentation assistance provided by guillermina Archibald.  Information recorded by the [email or web address] was done at my direction and has been verified and validated by me.     Salud Archibald  03/16/20 2215       Salud Archibald  03/16/20 2235       Salud Archibald  03/17/20 0029       Yair Oliva MD  03/17/20 0418

## 2020-03-17 NOTE — PROGRESS NOTES
Patient is on Apixaban.  Education provided on 3/17/20 in writing via RN (Rajan).     Thanks  Sj Art, PharmD  PGY1 Resident  3/17/2020  11:16

## 2020-03-18 VITALS
TEMPERATURE: 97.7 F | RESPIRATION RATE: 16 BRPM | SYSTOLIC BLOOD PRESSURE: 105 MMHG | HEIGHT: 70 IN | BODY MASS INDEX: 24.51 KG/M2 | DIASTOLIC BLOOD PRESSURE: 65 MMHG | OXYGEN SATURATION: 96 % | HEART RATE: 71 BPM | WEIGHT: 171.2 LBS

## 2020-03-18 LAB
ALBUMIN SERPL-MCNC: 3.5 G/DL (ref 3.5–5.2)
ALBUMIN/GLOB SERPL: 1.5 G/DL
ALP SERPL-CCNC: 44 U/L (ref 39–117)
ALT SERPL W P-5'-P-CCNC: 14 U/L (ref 1–33)
ANION GAP SERPL CALCULATED.3IONS-SCNC: 8 MMOL/L (ref 5–15)
AST SERPL-CCNC: 15 U/L (ref 1–32)
BASOPHILS # BLD AUTO: 0.04 10*3/MM3 (ref 0–0.2)
BASOPHILS NFR BLD AUTO: 0.6 % (ref 0–1.5)
BILIRUB SERPL-MCNC: 0.5 MG/DL (ref 0.2–1.2)
BUN BLD-MCNC: 9 MG/DL (ref 6–20)
BUN/CREAT SERPL: 11.5 (ref 7–25)
CALCIUM SPEC-SCNC: 8.8 MG/DL (ref 8.6–10.5)
CHLORIDE SERPL-SCNC: 105 MMOL/L (ref 98–107)
CHOLEST SERPL-MCNC: 177 MG/DL (ref 0–200)
CO2 SERPL-SCNC: 25 MMOL/L (ref 22–29)
CREAT BLD-MCNC: 0.78 MG/DL (ref 0.57–1)
DEPRECATED RDW RBC AUTO: 40.6 FL (ref 37–54)
EOSINOPHIL # BLD AUTO: 0.21 10*3/MM3 (ref 0–0.4)
EOSINOPHIL NFR BLD AUTO: 2.9 % (ref 0.3–6.2)
ERYTHROCYTE [DISTWIDTH] IN BLOOD BY AUTOMATED COUNT: 12.5 % (ref 12.3–15.4)
GFR SERPL CREATININE-BSD FRML MDRD: 79 ML/MIN/1.73
GLOBULIN UR ELPH-MCNC: 2.3 GM/DL
GLUCOSE BLD-MCNC: 91 MG/DL (ref 65–99)
HCT VFR BLD AUTO: 40 % (ref 34–46.6)
HDLC SERPL-MCNC: 57 MG/DL (ref 40–60)
HGB BLD-MCNC: 12.5 G/DL (ref 12–15.9)
IMM GRANULOCYTES # BLD AUTO: 0.02 10*3/MM3 (ref 0–0.05)
IMM GRANULOCYTES NFR BLD AUTO: 0.3 % (ref 0–0.5)
LDLC SERPL CALC-MCNC: 98 MG/DL (ref 0–100)
LDLC/HDLC SERPL: 1.71 {RATIO}
LYMPHOCYTES # BLD AUTO: 2.71 10*3/MM3 (ref 0.7–3.1)
LYMPHOCYTES NFR BLD AUTO: 37.8 % (ref 19.6–45.3)
MAGNESIUM SERPL-MCNC: 2.1 MG/DL (ref 1.6–2.6)
MCH RBC QN AUTO: 27.5 PG (ref 26.6–33)
MCHC RBC AUTO-ENTMCNC: 31.3 G/DL (ref 31.5–35.7)
MCV RBC AUTO: 88.1 FL (ref 79–97)
MONOCYTES # BLD AUTO: 0.48 10*3/MM3 (ref 0.1–0.9)
MONOCYTES NFR BLD AUTO: 6.7 % (ref 5–12)
NEUTROPHILS # BLD AUTO: 3.71 10*3/MM3 (ref 1.7–7)
NEUTROPHILS NFR BLD AUTO: 51.7 % (ref 42.7–76)
NRBC BLD AUTO-RTO: 0 /100 WBC (ref 0–0.2)
PHOSPHATE SERPL-MCNC: 2.9 MG/DL (ref 2.5–4.5)
PLATELET # BLD AUTO: 237 10*3/MM3 (ref 140–450)
PMV BLD AUTO: 10.5 FL (ref 6–12)
POTASSIUM BLD-SCNC: 4.6 MMOL/L (ref 3.5–5.2)
PROT SERPL-MCNC: 5.8 G/DL (ref 6–8.5)
RBC # BLD AUTO: 4.54 10*6/MM3 (ref 3.77–5.28)
SODIUM BLD-SCNC: 138 MMOL/L (ref 136–145)
TRIGL SERPL-MCNC: 112 MG/DL (ref 0–150)
VLDLC SERPL-MCNC: 22.4 MG/DL
WBC NRBC COR # BLD: 7.17 10*3/MM3 (ref 3.4–10.8)

## 2020-03-18 PROCEDURE — 99232 SBSQ HOSP IP/OBS MODERATE 35: CPT | Performed by: INTERNAL MEDICINE

## 2020-03-18 PROCEDURE — 93005 ELECTROCARDIOGRAM TRACING: CPT | Performed by: NURSE PRACTITIONER

## 2020-03-18 PROCEDURE — 80053 COMPREHEN METABOLIC PANEL: CPT | Performed by: INTERNAL MEDICINE

## 2020-03-18 PROCEDURE — 83735 ASSAY OF MAGNESIUM: CPT | Performed by: INTERNAL MEDICINE

## 2020-03-18 PROCEDURE — 80061 LIPID PANEL: CPT | Performed by: INTERNAL MEDICINE

## 2020-03-18 PROCEDURE — 85025 COMPLETE CBC W/AUTO DIFF WBC: CPT | Performed by: INTERNAL MEDICINE

## 2020-03-18 PROCEDURE — 84100 ASSAY OF PHOSPHORUS: CPT | Performed by: INTERNAL MEDICINE

## 2020-03-18 PROCEDURE — 99238 HOSP IP/OBS DSCHRG MGMT 30/<: CPT | Performed by: NURSE PRACTITIONER

## 2020-03-18 RX ORDER — PROPAFENONE HYDROCHLORIDE 225 MG/1
225 CAPSULE, EXTENDED RELEASE ORAL 2 TIMES DAILY
Qty: 60 CAPSULE | Refills: 5 | Status: ON HOLD | OUTPATIENT
Start: 2020-03-18 | End: 2020-04-15

## 2020-03-18 RX ORDER — FLECAINIDE ACETATE 50 MG/1
50 TABLET ORAL EVERY 12 HOURS SCHEDULED
Qty: 60 TABLET | Refills: 5 | Status: SHIPPED | OUTPATIENT
Start: 2020-03-18 | End: 2020-03-18 | Stop reason: HOSPADM

## 2020-03-18 RX ORDER — DILTIAZEM HYDROCHLORIDE 120 MG/1
120 CAPSULE, COATED, EXTENDED RELEASE ORAL EVERY MORNING
Status: DISCONTINUED | OUTPATIENT
Start: 2020-03-18 | End: 2020-03-18

## 2020-03-18 RX ORDER — PROPAFENONE HYDROCHLORIDE 225 MG/1
225 CAPSULE, EXTENDED RELEASE ORAL EVERY 12 HOURS SCHEDULED
Status: DISCONTINUED | OUTPATIENT
Start: 2020-03-18 | End: 2020-03-18 | Stop reason: HOSPADM

## 2020-03-18 RX ORDER — DILTIAZEM HYDROCHLORIDE 120 MG/1
120 CAPSULE, COATED, EXTENDED RELEASE ORAL EVERY MORNING
Qty: 30 CAPSULE | Refills: 5 | Status: SHIPPED | OUTPATIENT
Start: 2020-03-18 | End: 2020-03-18 | Stop reason: HOSPADM

## 2020-03-18 RX ADMIN — FLECAINIDE ACETATE 50 MG: 50 TABLET ORAL at 08:21

## 2020-03-18 RX ADMIN — APIXABAN 5 MG: 5 TABLET, FILM COATED ORAL at 08:14

## 2020-03-18 RX ADMIN — ACETAMINOPHEN 650 MG: 325 TABLET, FILM COATED ORAL at 08:14

## 2020-03-18 NOTE — PROGRESS NOTES
[facility]    Inpatient Progress Note      Chief Complaint/Reason for service:    · Atrial fibrillation         Subjective:       Has a severe headache.  Denies chest pain, palpitations    Past medical, surgical, social and family history reviewed in the patient's electronic medical record.    Review of Systems:   Positive for headache  Negative for exertional chest pain, dyspnea with exertion, lower extremity edema, palpitations    Problem List  Active Hospital Problems    Diagnosis  POA   • **Atrial fibrillation with RVR  [I48.91]  Yes   • Nonsustained ventricular tachycardia versus aberrantly conducted atrial fibrillation [I47.2]  Unknown   • Hypothyroidism [E03.9]  Yes   • Hypokalemia [E87.6]  Yes      Resolved Hospital Problems   No resolved problems to display.            Objective:      Current Facility-Administered Medications:   •  acetaminophen (TYLENOL) tablet 650 mg, 650 mg, Oral, Q6H PRN, Maureen Sánchez, APRN, 650 mg at 03/18/20 0814  •  ALPRAZolam (XANAX) tablet 0.25 mg, 0.25 mg, Oral, BID PRN, Juan Garibay MD, 0.25 mg at 03/17/20 2031  •  apixaban (ELIQUIS) tablet 5 mg, 5 mg, Oral, Q12H, Avtar Currie MD, 5 mg at 03/18/20 0814  •  flecainide (TAMBOCOR) tablet 50 mg, 50 mg, Oral, Q12H, Radha Mejias, APRN, 50 mg at 03/18/20 0821  •  melatonin tablet 5 mg, 5 mg, Oral, Nightly PRN, Mushtaq Wu APRN  •  metoprolol tartrate (LOPRESSOR) tablet 25 mg, 25 mg, Oral, Q12H, Radha Mejias P, APRN, 25 mg at 03/17/20 2028  •  ondansetron (ZOFRAN) tablet 4 mg, 4 mg, Oral, Q6H PRN, Maureen Sánchez APRN  •  potassium chloride (MICRO-K) CR capsule 40 mEq, 40 mEq, Oral, PRN, 40 mEq at 03/17/20 0101 **OR** potassium chloride (KLOR-CON) packet 40 mEq, 40 mEq, Oral, PRN **OR** potassium chloride 10 mEq in 100 mL IVPB, 10 mEq, Intravenous, Q1H PRN, Maureen Sánchez, QUIN  •  sodium chloride 0.9 % flush 10 mL, 10 mL, Intravenous, PRN, Yair Oliva,  MD, 10 mL at 03/17/20 2026    Vital Sign Min/Max for last 24 hours  Temp  Min: 98 °F (36.7 °C)  Max: 98.4 °F (36.9 °C)   BP  Min: 98/67  Max: 132/73   Pulse  Min: 62  Max: 80   Resp  Min: 18  Max: 18   SpO2  Min: 97 %  Max: 100 %   No data recorded      Intake/Output Summary (Last 24 hours) at 3/18/2020 0852  Last data filed at 3/17/2020 1200  Gross per 24 hour   Intake 59.1 ml   Output --   Net 59.1 ml           CONSTITUTIONAL: No acute distress  RESPIRATORY: Normal effort. Clear to auscultation bilaterally without wheezing or rales  CARDIOVASCULAR: Regular rate and rhythm with normal S1 and S2. Without murmur, gallop or rub. Normal radial pulses bilaterally.   PSYCH: Normal affect    Results Review:   Lab Results   Component Value Date    TROPONINT <0.010 03/17/2020       BUN   Date Value Ref Range Status   03/18/2020 9 6 - 20 mg/dL Final     Creatinine   Date Value Ref Range Status   03/18/2020 0.78 0.57 - 1.00 mg/dL Final     Potassium   Date Value Ref Range Status   03/18/2020 4.6 3.5 - 5.2 mmol/L Final     ALT (SGPT)   Date Value Ref Range Status   03/18/2020 14 1 - 33 U/L Final     AST (SGOT)   Date Value Ref Range Status   03/18/2020 15 1 - 32 U/L Final         Tele:  NSR    TTE 3/2020: Normal LVEF of 60%, normal diastolic function    Remote stress test 2016: Reportedly negative         Assessment/Plan:     ASSESSMENT:  1. Paroxysmal atrial fibrillation in the setting of recent use of Armor Thyroid followed by discontinuation and now an elevated TSH, hypokalemia, increased emotional/work stress, family history of AFib (both parents.  a. Prior history of significant palpitations beginning in 10/2016.  Unremarkable echocardiogram and stress test at that time  b. ECV x2 with brief return to normal sinus rhythm.  Converted to normal sinus rhythm on amiodarone.  c. NLMCH6CFRl= 1  2. Possible Hypothyroidism  a. Has previously been on thyroid supplementation as part of a weight loss program.  Has not had prior  elevated TSH.  Reports stopping Isaban Thyroid approximately 4 days ago.  b. TSH 7.790  3. Hypokalemia  a. Improved with replacement.    PLAN:  1. Continue flecainide  2. Switched metoprolol to low-dose, long-acting diltiazem in effort to avoid relative hypotension  3. Continue apixaban for at least the next 30 days.  Long-term anticoagulation decisions to be made upon follow-up in the cardiology clinic  4. Okay for discharge from a cardiac standpoint if stable this afternoon  5. 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 She was asked that she fax us a copy of that EKG for review. A paper order has been printed and placed in her chart    6. Follow-up in the electrophysiology clinic with Dr. Thomposn (who she has met before; Dr Thompson sees both her parents for AFib) in the next 8-12 weeks    Sulaiman Howard MD, MSc, [facility]  Interventional Cardiology  [facility]  3/18/2020    Addendum  - BP still low today this morning.  Patient concerned about diltiazem lowering her blood pressure which is reasonable.  -We will discontinue diltiazem and flecainide and start propafenone ER 225mg BID starting tonight  -If feeling well this afternoon, still okay for discharge from my standpoint    Sulaiman Howard MD, MSc, FACC  Interventional Cardiology  [facility]

## 2020-03-18 NOTE — PROGRESS NOTES
Case Management Discharge Note      Final Note: Spoke with patient at bedside.  Patient discharing home today with family 3/18.  Patient will be discharging home on Eliquis for 30 days.  30 day Eliquis trial card provided to patient and Prior auth initiated on Cover my meds in case script extends over 30 days.  Patient declines DME and home services at this time.  Follow up appointments have been scheduled and added to AVS.  Family to transport home in private vehicle.           Destination      No service has been selected for the patient.      Durable Medical Equipment      No service has been selected for the patient.      Dialysis/Infusion      No service has been selected for the patient.      Home Medical Care      No service has been selected for the patient.      Therapy      No service has been selected for the patient.      Community Resources      No service has been selected for the patient.        Transportation Services  Private: Car    Final Discharge Disposition Code: 01 - home or self-care

## 2020-03-18 NOTE — DISCHARGE SUMMARY
Discharge Summary    Patient name: Sumi Garay  CSN: [id number]  MRN: [id number]  : 1970  Today's date: 3/18/2020     Date of Admission: 3/16/2020    Date of Discharge:  3/18/2020    Admitting Physician: SUE Currie MD    Primary Care Provider: Rut Khanna MD    Consultations:   Dr. 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 This evening ~0 she noticed palpitations that was associated with throat tightness, dyspnea on exertion, bilateral hand numbness, and stomach tingling. She has had this in the past with most recent episode in November that sustained for ~3-4 days and resolved spontaneously.      She states that she has recently been on some hormone therapy, including thyroid replacement, to aid in weight loss. There is a familial history of AF in both her parents and mother underwent ablation this past . She denies pertinent medical history although she does not see a doctor on a regular basis.      On ED arrival she was noted to be in AF RVR with rates 200s. ECV X2 performed and each time she converted to NSR, but AF recurred within ~5 minutes with some runs of wide-complex beats. Cardizem infusion initiated and BP has remained satisfactory. Pertinent labs reveal negative troponin, K 2.9,  "Mg 2, Ca 9.3, and WBC 14.8. Potassium and magnesium replacements given in the ED. TSH is elevated at 7.79 previously 0.984 on 1/9/20 and she has reportedly been on replacement therapy. CXR negative for acute process.      Upon arrival to the intensive care unit she remains in atrial fibrillation however states that her palpitations are much better.  She is no longer short of breath.  Sensation is normal in her upper extremities.  She is anxious but without distress. She is requesting something for anxiety. There is family present at bedside.     Hospital Course:  Sumi Garay is a 49 y.o. female who presented to [facility] as discussed in HPI.  Patient was admitted to ICU.  Amiodarone was initiated and Cardizem discontinued.  Cardiology was asked to see the patient in the morning.  Following day the patient was noted to be in normal sinus rhythm on amiodarone.  Cardiology recommended initiation of flecainide, decrease metoprolol, ongoing Eliquis, and primary care follow-up for hypothyroidism.  She was transferred to telemetry and on hospital day #2 deemed to be able to be discharged home.  Medications were changed by cardiology to include Rythmol and discontinue metoprolol, and flecainide/diltiazem as the patient did not tolerate this.  She is to continue Eliquis for at least 30 days and obtain EKG at her place of work after which she is to fax this to cardiology.  She is to follow-up with primary care soon as possible for evaluation of hypothyroidism, and Dr. Thompson in 8 to 12 weeks.    Vitals:  /65 (BP Location: Right arm, Patient Position: Lying)   Pulse 71   Temp 97.7 °F (36.5 °C) (Axillary)   Resp 16   Ht 177.8 cm (70\")   Wt 77.7 kg (171 lb 3.2 oz)   LMP  (LMP Unknown)   SpO2 96%   BMI 24.56 kg/m²     Advance Directives: Code Status and Medical Interventions:   Ordered at: 03/16/20 8316     Level Of Support Discussed With:    Patient     Code Status:    CPR     Medical " Interventions (Level of Support Prior to Arrest):    Full        Physical Exam:  Constitutional:  Appears well-developed and well-nourished. No distress. Lying in bed.  HEENT:  Normocephalic and atraumatic. PERRL  Neck:  Neck supple. No JVD present.   CV: Normal rate, regular rhythm,  intact distal pulses.  No gallop, murmur or rub.  Pulmonary/Chest: Effort normal and breath sounds normal. No respiratory distress. No wheezes, rhonchi or rales.   Abdominal: Soft. +BS. No distension and no mass. There is no tenderness.   Musculoskeletal: Normal muscle tone and strength  Neurological: Alert and oriented to person, place, and time.  No focal deficits  Skin: Skin is warm and dry. No rash noted.   Extremities:  No clubbing, edema or cyanosis  Psychiatric: Normal mood and affect. Behavior is normal.  She complains of a headache which she attributes to stress    Labs:  Results from last 7 days   Lab Units 03/18/20  0435   WBC 10*3/mm3 7.17   HEMOGLOBIN g/dL 12.5   HEMATOCRIT % 40.0   PLATELETS 10*3/mm3 237     Results from last 7 days   Lab Units 03/18/20  0435   SODIUM mmol/L 138   POTASSIUM mmol/L 4.6   CHLORIDE mmol/L 105   CO2 mmol/L 25.0   BUN mg/dL 9   CREATININE mg/dL 0.78   CALCIUM mg/dL 8.8   BILIRUBIN mg/dL 0.5   ALK PHOS U/L 44   ALT (SGPT) U/L 14   AST (SGOT) U/L 15   GLUCOSE mg/dL 91         Magnesium   Date Value Ref Range Status   03/18/2020 2.1 1.6 - 2.6 mg/dL Final   03/17/2020 2.2 1.6 - 2.6 mg/dL Final   03/16/2020 2.0 1.6 - 2.6 mg/dL Final     Phosphorus   Date Value Ref Range Status   03/18/2020 2.9 2.5 - 4.5 mg/dL Final   03/17/2020 2.8 2.5 - 4.5 mg/dL Final                 Discharge Medications:     Discharge Medications      New Medications      Instructions Start Date   apixaban 5 MG tablet tablet  Commonly known as:  ELIQUIS   5 mg, Oral, Every 12 Hours Scheduled      propafenone  MG 12 hr capsule  Commonly known as:  RYTHMOL SR   225 mg, Oral, 2 Times Daily         Continue These Medications       Instructions Start Date   Biotin 5000 5 MG capsule  Generic drug:  Biotin   Oral      MULTIVITAMIN ADULT PO   Oral         Stop These Medications    progesterone 100 MG capsule  Commonly known as:  PROMETRIUM     Thyroid 60 MG tablet  Commonly known as:  ARMOUR            Discharge Diet:   Regular    Activity at Discharge:   With assistance    Follow-up Appointments  Future Appointments   Date Time Provider Department Center   5/27/2020  1:00 PM Urban Thompson MD [facility] None     Additional Instructions for the Follow-ups that You Need to Schedule     Discharge Follow-up with PCP   As directed       Currently Documented PCP:    Rut Khanna MD    PCP Phone Number:    [phone]     Follow Up Details:  ASAP for transition of care.         Discharge Follow-up with Specialty: Dr Thompson in 8-12 weeks   As directed      Specialty:  Dr Thompson in 8-12 weeks               Discharge Instructions:  Medications E prescribed  Follow-up as above       QUIN Blair, [facility]  Pulmonary & Critical Care Medicine    Time: Discharge 30 min    CC: Rut Khanna MD         [unfilled]

## 2020-03-26 NOTE — PAYOR COMM NOTE
"Ref # [id number]  Odessa Currie RN, BSN  Phone # [phone]  Fax # [phone]  Sumi Elmore (49 y.o. Female)     Date of Birth Social Security Number Address Home Phone MRN    1970  [address] [place] [address] [phone] [id number]    Mandaen Marital Status          Denominational        Admission Date Admission Type Admitting Provider Attending Provider Department, Room/Bed    3/16/20 Emergency Gracia Wild MD  [facility] 3E, S333/1    Discharge Date Discharge Disposition Discharge Destination        3/18/2020 Home or Self Care              Attending Provider:  (none)   Allergies:  Penicillins, Percocet [Oxycodone-acetaminophen]    Isolation:  None   Infection:  None   Code Status:  Prior    Ht:  177.8 cm (70\")   Wt:  77.7 kg (171 lb 3.2 oz)    Admission Cmt:  None   Principal Problem:  Atrial fibrillation with RVR  [I48.91]                 Active Insurance as of 3/16/2020     Primary Coverage     Payor Plan Insurance Group Employer/Plan Group    ANTHEM BLUE CROSS ANTHEM BLUE CROSS BLUE SHIELD PPO [id number]     Payor Plan Address Payor Plan Phone Number Payor Plan Fax Number Effective Dates    [address] [phone]  2019 - None Entered    [place] [address]       Subscriber Name Subscriber Birth Date Member ID       SUMI ELMORE 1970 [id number]               Discharge Summary      Avtar Currie MD at 20 1457          Discharge Summary    Patient name: Sumi Elmore  CSN: [id number]  MRN: [id number]  : 1970  Today's date: 3/18/2020     Date of Admission: 3/16/2020    Date of Discharge:  3/18/2020    Admitting Physician: SUE Currie MD    Primary Care Provider: Rut Khanna MD    Consultations:   Dr. 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 This evening ~2230 she noticed palpitations that was associated with throat tightness, dyspnea on exertion, bilateral hand numbness, and stomach tingling. She has had this in the past with most recent episode in November that sustained for ~3-4 days and resolved spontaneously.      She states that she has recently been on some hormone therapy, including thyroid replacement, to aid in weight loss. There is a familial history of AF in both her parents and mother underwent ablation this past fall. She denies pertinent medical history although she does not see a doctor on a regular basis.      On ED arrival she was noted to be in AF RVR with rates 200s. ECV X2 performed and each time she converted to NSR, but AF recurred within ~5 minutes with some runs of wide-complex beats. Cardizem infusion initiated and BP has remained satisfactory. Pertinent labs reveal negative troponin, K 2.9, Mg 2, Ca 9.3, and WBC 14.8. Potassium and magnesium replacements given in the ED. TSH is elevated at 7.79 previously 0.984 on 1/9/20 and she has reportedly been on replacement therapy. CXR negative for acute process.      Upon arrival to the intensive care unit she remains in atrial fibrillation however states that her palpitations are much better.  She is no longer short of breath.  Sensation is normal in her upper extremities.  She is anxious but without distress. She is  "requesting something for anxiety. There is family present at bedside.     Hospital Course:  Sumi Garay is a 49 y.o. female who [facility].  Patient was admitted to ICU.  Amiodarone was initiated and Cardizem discontinued.  Cardiology was asked to see the patient in the morning.  Following day the patient was noted to be in normal sinus rhythm on amiodarone.  Cardiology recommended initiation of flecainide, decrease metoprolol, ongoing Eliquis, and primary care follow-up for hypothyroidism.  She was transferred to telemetry and on hospital day #2 deemed to be able to be discharged home.  Medications were changed by cardiology to include Rythmol and discontinue metoprolol, and flecainide/diltiazem as the patient did not tolerate this.  She is to continue Eliquis for at least 30 days and obtain EKG at her place of work after which she is to fax this to cardiology.  She is to follow-up with primary care soon as possible for evaluation of hypothyroidism, and Dr. Thompson in 8 to 12 weeks.    Vitals:  /65 (BP Location: Right arm, Patient Position: Lying)   Pulse 71   Temp 97.7 °F (36.5 °C) (Axillary)   Resp 16   Ht 177.8 cm (70\")   Wt 77.7 kg (171 lb 3.2 oz)   LMP  (LMP Unknown)   SpO2 96%   BMI 24.56 kg/m²      Advance Directives: Code Status and Medical Interventions:   Ordered at: 03/16/20 5451     Level Of Support Discussed With:    Patient     Code Status:    CPR     Medical Interventions (Level of Support Prior to Arrest):    Full        Physical Exam:  Constitutional:  Appears well-developed and well-nourished. No distress. Lying in bed.  HEENT:  Normocephalic and atraumatic. PERRL  Neck:  Neck supple. No JVD present.   CV: Normal rate, regular rhythm,  intact distal pulses.  No gallop, murmur or rub.  Pulmonary/Chest: Effort normal and breath sounds normal. No respiratory distress. No wheezes, rhonchi or rales.   Abdominal: Soft. +BS. No " distension and no mass. There is no tenderness.   Musculoskeletal: Normal muscle tone and strength  Neurological: Alert and oriented to person, place, and time.  No focal deficits  Skin: Skin is warm and dry. No rash noted.   Extremities:  No clubbing, edema or cyanosis  Psychiatric: Normal mood and affect. Behavior is normal.  She complains of a headache which she attributes to stress    Labs:  Results from last 7 days   Lab Units 03/18/20  0435   WBC 10*3/mm3 7.17   HEMOGLOBIN g/dL 12.5   HEMATOCRIT % 40.0   PLATELETS 10*3/mm3 237     Results from last 7 days   Lab Units 03/18/20  0435   SODIUM mmol/L 138   POTASSIUM mmol/L 4.6   CHLORIDE mmol/L 105   CO2 mmol/L 25.0   BUN mg/dL 9   CREATININE mg/dL 0.78   CALCIUM mg/dL 8.8   BILIRUBIN mg/dL 0.5   ALK PHOS U/L 44   ALT (SGPT) U/L 14   AST (SGOT) U/L 15   GLUCOSE mg/dL 91         Magnesium   Date Value Ref Range Status   03/18/2020 2.1 1.6 - 2.6 mg/dL Final   03/17/2020 2.2 1.6 - 2.6 mg/dL Final   03/16/2020 2.0 1.6 - 2.6 mg/dL Final     Phosphorus   Date Value Ref Range Status   03/18/2020 2.9 2.5 - 4.5 mg/dL Final   03/17/2020 2.8 2.5 - 4.5 mg/dL Final                 Discharge Medications:     Discharge Medications      New Medications      Instructions Start Date   apixaban 5 MG tablet tablet  Commonly known as:  ELIQUIS   5 mg, Oral, Every 12 Hours Scheduled      propafenone  MG 12 hr capsule  Commonly known as:  RYTHMOL SR   225 mg, Oral, 2 Times Daily         Continue These Medications      Instructions Start Date   Biotin 5000 5 MG capsule  Generic drug:  Biotin   Oral      MULTIVITAMIN ADULT PO   Oral         Stop These Medications    progesterone 100 MG capsule  Commonly known as:  PROMETRIUM     Thyroid 60 MG tablet  Commonly known as:  ARMOUR            Discharge Diet:   Regular    Activity at Discharge:   With assistance    Follow-up Appointments  Future Appointments   Date Time Provider Department Center   5/27/2020  1:00 PM Urban Thompson,  MD [facility] None     Additional Instructions for the Follow-ups that You Need to Schedule     Discharge Follow-up with PCP   As directed       Currently Documented PCP:    Rut Khanna MD    PCP Phone Number:    [phone]     Follow Up Details:  ASAP for transition of care.         Discharge Follow-up with Specialty: Dr Thompson in 8-12 weeks   As directed      Specialty:  Dr Thompson in 8-12 weeks               Discharge Instructions:  Medications E prescribed  Follow-up as above       QUIN Blair, [facility]  Pulmonary & Critical Care Medicine    Time: Discharge 30 min    CC: Rut Khanna MD         [unfilled]    Electronically signed by Avtar Chester MD at 03/19/20 1106       Discharge Order (From admission, onward)     Start     Ordered    03/18/20 1603  Discharge patient  Once     Expected Discharge Date:  03/18/20    Expected Discharge Time:  Afternoon    Discharge Disposition:  Home or Self Care    Physician of Record for Attribution - Please select from Treatment Team:  AVTAR CHESTER [[id number]]    Review needed by CMO to determine Physician of Record:  No    Please choose which facility the patient is currently admitted if they are being discharged to another facility or unit.:   [facility]    Mode:  Family       Question Answer Comment   Physician of Record for Attribution - Please select from Treatment Team AVTAR CHESTER    Review needed by CMO to determine Physician of Record No    Please choose which facility the patient is currently admitted if they are being discharged to another facility or unit.  [facility]    Mode: Family        03/18/20 0283

## 2020-04-14 ENCOUNTER — APPOINTMENT (OUTPATIENT)
Dept: GENERAL RADIOLOGY | Facility: HOSPITAL | Age: 50
End: 2020-04-14

## 2020-04-14 ENCOUNTER — HOSPITAL ENCOUNTER (INPATIENT)
Facility: HOSPITAL | Age: 50
LOS: 1 days | Discharge: HOME | End: 2020-04-15
Attending: EMERGENCY MEDICINE | Admitting: INTERNAL MEDICINE

## 2020-04-14 DIAGNOSIS — I48.91 ATRIAL FIBRILLATION WITH RVR (HCC): Primary | ICD-10-CM

## 2020-04-14 DIAGNOSIS — E87.6 HYPOKALEMIA: ICD-10-CM

## 2020-04-14 LAB
ALBUMIN SERPL-MCNC: 4 G/DL (ref 3.5–5.2)
ALBUMIN/GLOB SERPL: 1.3 G/DL
ALP SERPL-CCNC: 97 U/L (ref 39–117)
ALT SERPL W P-5'-P-CCNC: 15 U/L (ref 1–33)
ANION GAP SERPL CALCULATED.3IONS-SCNC: 15 MMOL/L (ref 5–15)
AST SERPL-CCNC: 13 U/L (ref 1–32)
BASOPHILS # BLD AUTO: 0.1 10*3/MM3 (ref 0–0.2)
BASOPHILS NFR BLD AUTO: 0.6 % (ref 0–1.5)
BILIRUB SERPL-MCNC: 0.2 MG/DL (ref 0.2–1.2)
BUN BLD-MCNC: 11 MG/DL (ref 6–20)
BUN/CREAT SERPL: 12.2 (ref 7–25)
CALCIUM SPEC-SCNC: 9.2 MG/DL (ref 8.6–10.5)
CHLORIDE SERPL-SCNC: 97 MMOL/L (ref 98–107)
CO2 SERPL-SCNC: 24 MMOL/L (ref 22–29)
CREAT BLD-MCNC: 0.9 MG/DL (ref 0.57–1)
DEPRECATED RDW RBC AUTO: 37 FL (ref 37–54)
EOSINOPHIL # BLD AUTO: 1 10*3/MM3 (ref 0–0.4)
EOSINOPHIL NFR BLD AUTO: 5.7 % (ref 0.3–6.2)
ERYTHROCYTE [DISTWIDTH] IN BLOOD BY AUTOMATED COUNT: 12.2 % (ref 12.3–15.4)
GFR SERPL CREATININE-BSD FRML MDRD: 67 ML/MIN/1.73
GLOBULIN UR ELPH-MCNC: 3 GM/DL
GLUCOSE BLD-MCNC: 256 MG/DL (ref 65–99)
HCT VFR BLD AUTO: 40.3 % (ref 34–46.6)
HGB BLD-MCNC: 12.9 G/DL (ref 12–15.9)
HOLD SPECIMEN: NORMAL
HOLD SPECIMEN: NORMAL
IMM GRANULOCYTES # BLD AUTO: 0.06 10*3/MM3 (ref 0–0.05)
IMM GRANULOCYTES NFR BLD AUTO: 0.3 % (ref 0–0.5)
LIPASE SERPL-CCNC: 39 U/L (ref 13–60)
LYMPHOCYTES # BLD AUTO: 4.39 10*3/MM3 (ref 0.7–3.1)
LYMPHOCYTES NFR BLD AUTO: 25.1 % (ref 19.6–45.3)
MAGNESIUM SERPL-MCNC: 1.7 MG/DL (ref 1.6–2.6)
MCH RBC QN AUTO: 26.9 PG (ref 26.6–33)
MCHC RBC AUTO-ENTMCNC: 32 G/DL (ref 31.5–35.7)
MCV RBC AUTO: 84 FL (ref 79–97)
MONOCYTES # BLD AUTO: 0.9 10*3/MM3 (ref 0.1–0.9)
MONOCYTES NFR BLD AUTO: 5.2 % (ref 5–12)
NEUTROPHILS # BLD AUTO: 11.02 10*3/MM3 (ref 1.7–7)
NEUTROPHILS NFR BLD AUTO: 63.1 % (ref 42.7–76)
NRBC BLD AUTO-RTO: 0 /100 WBC (ref 0–0.2)
NT-PROBNP SERPL-MCNC: 151.4 PG/ML (ref 5–450)
PLATELET # BLD AUTO: 346 10*3/MM3 (ref 140–450)
PMV BLD AUTO: 10.2 FL (ref 6–12)
POTASSIUM BLD-SCNC: 3.2 MMOL/L (ref 3.5–5.2)
PROT SERPL-MCNC: 7 G/DL (ref 6–8.5)
RBC # BLD AUTO: 4.8 10*6/MM3 (ref 3.77–5.28)
SODIUM BLD-SCNC: 136 MMOL/L (ref 136–145)
TROPONIN T SERPL-MCNC: <0.01 NG/ML (ref 0–0.03)
WBC NRBC COR # BLD: 17.47 10*3/MM3 (ref 3.4–10.8)
WHOLE BLOOD HOLD SPECIMEN: NORMAL
WHOLE BLOOD HOLD SPECIMEN: NORMAL

## 2020-04-14 PROCEDURE — 83880 ASSAY OF NATRIURETIC PEPTIDE: CPT | Performed by: EMERGENCY MEDICINE

## 2020-04-14 PROCEDURE — 93010 ELECTROCARDIOGRAM REPORT: CPT | Performed by: INTERNAL MEDICINE

## 2020-04-14 PROCEDURE — 25010000003 POTASSIUM CHLORIDE 10 MEQ/100ML SOLUTION: Performed by: EMERGENCY MEDICINE

## 2020-04-14 PROCEDURE — 25010000002 DIGOXIN PER 500 MCG: Performed by: EMERGENCY MEDICINE

## 2020-04-14 PROCEDURE — 84484 ASSAY OF TROPONIN QUANT: CPT | Performed by: EMERGENCY MEDICINE

## 2020-04-14 PROCEDURE — 99285 EMERGENCY DEPT VISIT HI MDM: CPT

## 2020-04-14 PROCEDURE — 83735 ASSAY OF MAGNESIUM: CPT | Performed by: EMERGENCY MEDICINE

## 2020-04-14 PROCEDURE — 85025 COMPLETE CBC W/AUTO DIFF WBC: CPT | Performed by: EMERGENCY MEDICINE

## 2020-04-14 PROCEDURE — 80053 COMPREHEN METABOLIC PANEL: CPT | Performed by: EMERGENCY MEDICINE

## 2020-04-14 PROCEDURE — 93005 ELECTROCARDIOGRAM TRACING: CPT | Performed by: EMERGENCY MEDICINE

## 2020-04-14 PROCEDURE — 83690 ASSAY OF LIPASE: CPT | Performed by: EMERGENCY MEDICINE

## 2020-04-14 PROCEDURE — 25010000002 PROPOFOL 10 MG/ML EMULSION: Performed by: EMERGENCY MEDICINE

## 2020-04-14 PROCEDURE — 92960 CARDIOVERSION ELECTRIC EXT: CPT

## 2020-04-14 PROCEDURE — 5A2204Z RESTORATION OF CARDIAC RHYTHM, SINGLE: ICD-10-PCS | Performed by: EMERGENCY MEDICINE

## 2020-04-14 PROCEDURE — 71045 X-RAY EXAM CHEST 1 VIEW: CPT

## 2020-04-14 PROCEDURE — 99152 MOD SED SAME PHYS/QHP 5/>YRS: CPT

## 2020-04-14 RX ORDER — PROPOFOL 10 MG/ML
VIAL (ML) INTRAVENOUS
Status: COMPLETED | OUTPATIENT
Start: 2020-04-14 | End: 2020-04-14

## 2020-04-14 RX ORDER — PROPOFOL 10 MG/ML
80 VIAL (ML) INTRAVENOUS ONCE
Status: COMPLETED | OUTPATIENT
Start: 2020-04-14 | End: 2020-04-14

## 2020-04-14 RX ORDER — ESMOLOL HYDROCHLORIDE 10 MG/ML
50-300 INJECTION, SOLUTION INTRAVENOUS
Status: DISCONTINUED | OUTPATIENT
Start: 2020-04-14 | End: 2020-04-15 | Stop reason: HOSPADM

## 2020-04-14 RX ORDER — POTASSIUM CHLORIDE 7.45 MG/ML
10 INJECTION INTRAVENOUS ONCE
Status: DISCONTINUED | OUTPATIENT
Start: 2020-04-14 | End: 2020-04-15 | Stop reason: HOSPADM

## 2020-04-14 RX ORDER — POTASSIUM CHLORIDE 750 MG/1
40 CAPSULE, EXTENDED RELEASE ORAL ONCE
Status: COMPLETED | OUTPATIENT
Start: 2020-04-14 | End: 2020-04-14

## 2020-04-14 RX ORDER — SODIUM CHLORIDE 0.9 % (FLUSH) 0.9 %
10 SYRINGE (ML) INJECTION AS NEEDED
Status: DISCONTINUED | OUTPATIENT
Start: 2020-04-14 | End: 2020-04-15 | Stop reason: HOSPADM

## 2020-04-14 RX ORDER — POTASSIUM CHLORIDE 1.5 G/1.77G
40 POWDER, FOR SOLUTION ORAL ONCE
Status: DISCONTINUED | OUTPATIENT
Start: 2020-04-14 | End: 2020-04-14 | Stop reason: ALTCHOICE

## 2020-04-14 RX ORDER — POTASSIUM CHLORIDE 7.45 MG/ML
10 INJECTION INTRAVENOUS ONCE
Status: DISCONTINUED | OUTPATIENT
Start: 2020-04-15 | End: 2020-04-15 | Stop reason: HOSPADM

## 2020-04-14 RX ORDER — POTASSIUM CHLORIDE 7.45 MG/ML
10 INJECTION INTRAVENOUS ONCE
Status: COMPLETED | OUTPATIENT
Start: 2020-04-14 | End: 2020-04-15

## 2020-04-14 RX ORDER — SODIUM CHLORIDE 9 MG/ML
125 INJECTION, SOLUTION INTRAVENOUS CONTINUOUS
Status: DISCONTINUED | OUTPATIENT
Start: 2020-04-14 | End: 2020-04-15 | Stop reason: HOSPADM

## 2020-04-14 RX ORDER — POTASSIUM CHLORIDE 7.45 MG/ML
10 INJECTION INTRAVENOUS ONCE
Status: COMPLETED | OUTPATIENT
Start: 2020-04-14 | End: 2020-04-14

## 2020-04-14 RX ORDER — DIGOXIN 0.25 MG/ML
500 INJECTION INTRAMUSCULAR; INTRAVENOUS ONCE
Status: COMPLETED | OUTPATIENT
Start: 2020-04-14 | End: 2020-04-14

## 2020-04-14 RX ORDER — ASPIRIN 81 MG/1
324 TABLET, CHEWABLE ORAL ONCE
Status: DISCONTINUED | OUTPATIENT
Start: 2020-04-14 | End: 2020-04-15 | Stop reason: HOSPADM

## 2020-04-14 RX ADMIN — SODIUM CHLORIDE 1000 ML: 9 INJECTION, SOLUTION INTRAVENOUS at 21:10

## 2020-04-14 RX ADMIN — POTASSIUM CHLORIDE 10 MEQ: 7.46 INJECTION, SOLUTION INTRAVENOUS at 23:18

## 2020-04-14 RX ADMIN — POTASSIUM CHLORIDE 10 MEQ: 7.46 INJECTION, SOLUTION INTRAVENOUS at 21:53

## 2020-04-14 RX ADMIN — POTASSIUM CHLORIDE 40 MEQ: 10 CAPSULE, COATED, EXTENDED RELEASE ORAL at 22:06

## 2020-04-14 RX ADMIN — PROPOFOL 80 MG: 10 INJECTION, EMULSION INTRAVENOUS at 21:18

## 2020-04-14 RX ADMIN — PROPOFOL 30 MG: 10 INJECTION, EMULSION INTRAVENOUS at 21:20

## 2020-04-14 RX ADMIN — DIGOXIN 500 MCG: 0.25 INJECTION INTRAMUSCULAR; INTRAVENOUS at 22:59

## 2020-04-14 RX ADMIN — ESMOLOL HYDROCHLORIDE 50 MCG/KG/MIN: 10 INJECTION INTRAVENOUS at 22:15

## 2020-04-15 ENCOUNTER — READMISSION MANAGEMENT (OUTPATIENT)
Dept: CALL CENTER | Facility: HOSPITAL | Age: 50
End: 2020-04-15

## 2020-04-15 VITALS
RESPIRATION RATE: 18 BRPM | BODY MASS INDEX: 24.05 KG/M2 | DIASTOLIC BLOOD PRESSURE: 62 MMHG | TEMPERATURE: 97.9 F | HEART RATE: 77 BPM | HEIGHT: 70 IN | SYSTOLIC BLOOD PRESSURE: 101 MMHG | WEIGHT: 168 LBS | OXYGEN SATURATION: 97 %

## 2020-04-15 PROBLEM — E83.42 HYPOMAGNESEMIA: Status: ACTIVE | Noted: 2020-04-15

## 2020-04-15 PROBLEM — D72.829 LEUKOCYTOSIS: Status: ACTIVE | Noted: 2020-04-15

## 2020-04-15 LAB
ANION GAP SERPL CALCULATED.3IONS-SCNC: 11 MMOL/L (ref 5–15)
BASOPHILS # BLD AUTO: 0.06 10*3/MM3 (ref 0–0.2)
BASOPHILS NFR BLD AUTO: 0.4 % (ref 0–1.5)
BUN BLD-MCNC: 12 MG/DL (ref 6–20)
BUN/CREAT SERPL: 18.5 (ref 7–25)
CALCIUM SPEC-SCNC: 9.2 MG/DL (ref 8.6–10.5)
CHLORIDE SERPL-SCNC: 108 MMOL/L (ref 98–107)
CO2 SERPL-SCNC: 23 MMOL/L (ref 22–29)
CREAT BLD-MCNC: 0.65 MG/DL (ref 0.57–1)
DEPRECATED RDW RBC AUTO: 37.5 FL (ref 37–54)
EOSINOPHIL # BLD AUTO: 0.24 10*3/MM3 (ref 0–0.4)
EOSINOPHIL NFR BLD AUTO: 1.5 % (ref 0.3–6.2)
ERYTHROCYTE [DISTWIDTH] IN BLOOD BY AUTOMATED COUNT: 12.1 % (ref 12.3–15.4)
GFR SERPL CREATININE-BSD FRML MDRD: 97 ML/MIN/1.73
GLUCOSE BLD-MCNC: 164 MG/DL (ref 65–99)
HCT VFR BLD AUTO: 39.7 % (ref 34–46.6)
HGB BLD-MCNC: 12.7 G/DL (ref 12–15.9)
IMM GRANULOCYTES # BLD AUTO: 0.08 10*3/MM3 (ref 0–0.05)
IMM GRANULOCYTES NFR BLD AUTO: 0.5 % (ref 0–0.5)
LYMPHOCYTES # BLD AUTO: 1.48 10*3/MM3 (ref 0.7–3.1)
LYMPHOCYTES NFR BLD AUTO: 9.5 % (ref 19.6–45.3)
MAGNESIUM SERPL-MCNC: 2.4 MG/DL (ref 1.6–2.6)
MCH RBC QN AUTO: 27.4 PG (ref 26.6–33)
MCHC RBC AUTO-ENTMCNC: 32 G/DL (ref 31.5–35.7)
MCV RBC AUTO: 85.6 FL (ref 79–97)
MONOCYTES # BLD AUTO: 0.73 10*3/MM3 (ref 0.1–0.9)
MONOCYTES NFR BLD AUTO: 4.7 % (ref 5–12)
NEUTROPHILS # BLD AUTO: 13 10*3/MM3 (ref 1.7–7)
NEUTROPHILS NFR BLD AUTO: 83.4 % (ref 42.7–76)
NRBC BLD AUTO-RTO: 0 /100 WBC (ref 0–0.2)
PLATELET # BLD AUTO: 319 10*3/MM3 (ref 140–450)
PMV BLD AUTO: 10.3 FL (ref 6–12)
POTASSIUM BLD-SCNC: 4.4 MMOL/L (ref 3.5–5.2)
POTASSIUM BLD-SCNC: 4.5 MMOL/L (ref 3.5–5.2)
RBC # BLD AUTO: 4.64 10*6/MM3 (ref 3.77–5.28)
SARS-COV-2 RNA RESP QL NAA+PROBE: NOT DETECTED
SODIUM BLD-SCNC: 142 MMOL/L (ref 136–145)
TROPONIN T SERPL-MCNC: <0.01 NG/ML (ref 0–0.03)
TSH SERPL DL<=0.05 MIU/L-ACNC: 1.71 UIU/ML (ref 0.27–4.2)
WBC NRBC COR # BLD: 15.59 10*3/MM3 (ref 3.4–10.8)

## 2020-04-15 PROCEDURE — 87635 SARS-COV-2 COVID-19 AMP PRB: CPT | Performed by: NURSE PRACTITIONER

## 2020-04-15 PROCEDURE — 93005 ELECTROCARDIOGRAM TRACING: CPT | Performed by: INTERNAL MEDICINE

## 2020-04-15 PROCEDURE — 83735 ASSAY OF MAGNESIUM: CPT | Performed by: NURSE PRACTITIONER

## 2020-04-15 PROCEDURE — 80048 BASIC METABOLIC PNL TOTAL CA: CPT | Performed by: NURSE PRACTITIONER

## 2020-04-15 PROCEDURE — 84484 ASSAY OF TROPONIN QUANT: CPT | Performed by: NURSE PRACTITIONER

## 2020-04-15 PROCEDURE — 84132 ASSAY OF SERUM POTASSIUM: CPT | Performed by: INTERNAL MEDICINE

## 2020-04-15 PROCEDURE — 85025 COMPLETE CBC W/AUTO DIFF WBC: CPT | Performed by: NURSE PRACTITIONER

## 2020-04-15 PROCEDURE — 25010000003 POTASSIUM CHLORIDE 10 MEQ/100ML SOLUTION: Performed by: EMERGENCY MEDICINE

## 2020-04-15 PROCEDURE — 99253 IP/OBS CNSLTJ NEW/EST LOW 45: CPT | Performed by: INTERNAL MEDICINE

## 2020-04-15 PROCEDURE — 93010 ELECTROCARDIOGRAM REPORT: CPT | Performed by: INTERNAL MEDICINE

## 2020-04-15 PROCEDURE — 84443 ASSAY THYROID STIM HORMONE: CPT | Performed by: NURSE PRACTITIONER

## 2020-04-15 PROCEDURE — 99235 HOSP IP/OBS SAME DATE MOD 70: CPT | Performed by: INTERNAL MEDICINE

## 2020-04-15 PROCEDURE — 84484 ASSAY OF TROPONIN QUANT: CPT | Performed by: INTERNAL MEDICINE

## 2020-04-15 RX ORDER — CHOLECALCIFEROL (VITAMIN D3) 125 MCG
5 CAPSULE ORAL NIGHTLY PRN
Status: DISCONTINUED | OUTPATIENT
Start: 2020-04-15 | End: 2020-04-15 | Stop reason: HOSPADM

## 2020-04-15 RX ORDER — PROPAFENONE HYDROCHLORIDE 225 MG/1
225 CAPSULE, EXTENDED RELEASE ORAL 2 TIMES DAILY
Status: DISCONTINUED | OUTPATIENT
Start: 2020-04-15 | End: 2020-04-15

## 2020-04-15 RX ORDER — MAGNESIUM SULFATE HEPTAHYDRATE 40 MG/ML
4 INJECTION, SOLUTION INTRAVENOUS AS NEEDED
Status: DISCONTINUED | OUTPATIENT
Start: 2020-04-15 | End: 2020-04-15 | Stop reason: HOSPADM

## 2020-04-15 RX ORDER — MAGNESIUM SULFATE HEPTAHYDRATE 40 MG/ML
2 INJECTION, SOLUTION INTRAVENOUS AS NEEDED
Status: DISCONTINUED | OUTPATIENT
Start: 2020-04-15 | End: 2020-04-15 | Stop reason: SDUPTHER

## 2020-04-15 RX ORDER — PROPAFENONE HYDROCHLORIDE 325 MG/1
325 CAPSULE, EXTENDED RELEASE ORAL 2 TIMES DAILY
Qty: 60 CAPSULE | Refills: 3 | Status: SHIPPED | OUTPATIENT
Start: 2020-04-15 | End: 2020-06-03 | Stop reason: HOSPADM

## 2020-04-15 RX ORDER — POTASSIUM CHLORIDE 1.5 G/1.77G
40 POWDER, FOR SOLUTION ORAL AS NEEDED
Status: DISCONTINUED | OUTPATIENT
Start: 2020-04-15 | End: 2020-04-15 | Stop reason: HOSPADM

## 2020-04-15 RX ORDER — POTASSIUM CHLORIDE 7.45 MG/ML
10 INJECTION INTRAVENOUS
Status: DISCONTINUED | OUTPATIENT
Start: 2020-04-15 | End: 2020-04-15 | Stop reason: HOSPADM

## 2020-04-15 RX ORDER — MAGNESIUM SULFATE HEPTAHYDRATE 40 MG/ML
2 INJECTION, SOLUTION INTRAVENOUS AS NEEDED
Status: DISCONTINUED | OUTPATIENT
Start: 2020-04-15 | End: 2020-04-15 | Stop reason: HOSPADM

## 2020-04-15 RX ORDER — POTASSIUM CHLORIDE 750 MG/1
40 CAPSULE, EXTENDED RELEASE ORAL AS NEEDED
Status: DISCONTINUED | OUTPATIENT
Start: 2020-04-15 | End: 2020-04-15 | Stop reason: HOSPADM

## 2020-04-15 RX ORDER — PROPAFENONE HYDROCHLORIDE 325 MG/1
325 CAPSULE, EXTENDED RELEASE ORAL 2 TIMES DAILY
Status: DISCONTINUED | OUTPATIENT
Start: 2020-04-15 | End: 2020-04-15 | Stop reason: HOSPADM

## 2020-04-15 RX ORDER — SODIUM CHLORIDE 0.9 % (FLUSH) 0.9 %
10 SYRINGE (ML) INJECTION EVERY 12 HOURS SCHEDULED
Status: DISCONTINUED | OUTPATIENT
Start: 2020-04-15 | End: 2020-04-15 | Stop reason: HOSPADM

## 2020-04-15 RX ORDER — MAGNESIUM SULFATE HEPTAHYDRATE 40 MG/ML
4 INJECTION, SOLUTION INTRAVENOUS AS NEEDED
Status: DISCONTINUED | OUTPATIENT
Start: 2020-04-15 | End: 2020-04-15 | Stop reason: SDUPTHER

## 2020-04-15 RX ORDER — SODIUM CHLORIDE 0.9 % (FLUSH) 0.9 %
10 SYRINGE (ML) INJECTION AS NEEDED
Status: DISCONTINUED | OUTPATIENT
Start: 2020-04-15 | End: 2020-04-15 | Stop reason: HOSPADM

## 2020-04-15 RX ADMIN — SODIUM CHLORIDE, PRESERVATIVE FREE 10 ML: 5 INJECTION INTRAVENOUS at 08:56

## 2020-04-15 RX ADMIN — SODIUM CHLORIDE 125 ML/HR: 9 INJECTION, SOLUTION INTRAVENOUS at 03:48

## 2020-04-15 RX ADMIN — SODIUM CHLORIDE, PRESERVATIVE FREE 10 ML: 5 INJECTION INTRAVENOUS at 01:49

## 2020-04-15 RX ADMIN — APIXABAN 5 MG: 5 TABLET, FILM COATED ORAL at 08:56

## 2020-04-15 RX ADMIN — PROPAFENONE HYDROCHLORIDE 225 MG: 225 CAPSULE, EXTENDED RELEASE ORAL at 08:56

## 2020-04-15 RX ADMIN — SODIUM CHLORIDE 125 ML/HR: 9 INJECTION, SOLUTION INTRAVENOUS at 11:49

## 2020-04-15 NOTE — PAYOR COMM NOTE
"Id# [id number]  ICD 10: I48.91 afib  Admitted on 20   Keke Barry DO  (NPI: [id number])  [facility] NPI: [id number]  Tax id : [id number]  Odessa Currie RN, BSN  Phone # [phone]  Fax # [phone]  Sumi Elmore (49 y.o. Female)     Date of Birth Social Security Number Address Home Phone MRN    1970  [address] [place] [address] [phone] [id number]    Bapt[employer] Marital Status          [place]        Admission Date Admission Type Admitting Provider Attending Provider Department, Room/Bed    20 Emergency Vilma Jones MD Hunter, Sarah M, MD [facility] 6A, N604/1    Discharge Date Discharge Disposition Discharge Destination                       Attending Provider:  Vilma Jones MD    Allergies:  Penicillins, Percocet [Oxycodone-acetaminophen]    Isolation:  None   Infection:  None   Code Status:  CPR    Ht:  177.8 cm (70\")   Wt:  76.2 kg (168 lb)    Admission Cmt:  None   Principal Problem:  Atrial fibrillation with RVR  [I48.91]                 Active Insurance as of 2020     Primary Coverage     Payor Plan Insurance Group Employer/Plan Group    ANTHEM BLUE CROSS [facility] BOOK A TIGER [facility] PPO [id number]     Payor Plan Address Payor Plan Phone Number Payor Plan Fax Number Effective Dates    [address] [phone]  2019 - None Entered    [place] [address]       Subscriber Name Subscriber Birth Date Member ID       SUMI ELMORE 1970 [id number]                    History & Physical      Asha Keke LAKISHA  at 04/15/20 0017              [facility] Medicine Services  HISTORY AND PHYSICAL    Patient Name: Sumi Elmore  : 1970  MRN: [id number]  Primary Care Physician: Rut Khanna MD  Date of admission: 2020      Subjective   Subjective     Chief Complaint:  Heart palpitations    HPI:  Sumi Elmore is a 49 y.o. female with a " medical history significant for hypothyroidism, basal cell carcinoma, uterine leiomyoma and atrial fibrillation on Eliquis (onset 3/16/20 at which time she was admitted to [facility] ICU and was on both cardizem then switched to amiodarone, she was seen by cardiology and d/c'd home on rhythmol and was to continue Eliquis for at least 30 days and repeat EKG with f/u to see Dr. Thompson in 8-12 weeks).  Patient states that tonight seated at home she had acute onset palpitations, chest pressure, shortness of breath, nausea, lightheaded/dizzy sensation, and bilateral hand numbness.  She says symptoms were similar to prior episode of atrial fibrillation.  She called EMS and was brought to the ED for further evaluation and care.  Patient denies cough, fever.  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        COVID-19 RISK SCREEN     1. Has the patient had close contact without PPE with a lab confirmed COVID-19 (+) person or a person under investigation (PUI) for COVID-19 infection?  -- no    2. Has the patient had respiratory symptoms, worsened/new cough and/or SOA, unexplained fever, or sudden loss of smell and/or taste in the past 7 days? --  no, but transient SOB with Afib    3. Does the patient have baseline higher exposure risk such as working in healthcare field, currently residing in healthcare facility, or ongoing hemodialysis?  --  Yes         Review of Systems   Constitutional: Negative.    HENT: Negative.    Eyes: Negative.    Respiratory: Positive for chest tightness and shortness of breath.    Cardiovascular: Positive for palpitations.   Gastrointestinal: Positive for nausea. Negative for vomiting.   Endocrine: Negative.    Genitourinary: Negative.    Skin: Negative.    Allergic/Immunologic: Negative.    Neurological: Positive for dizziness and light-headedness.   Hematological: Negative.    Psychiatric/Behavioral: Negative.         All other systems  reviewed and are negative.     Personal History     Past Medical History:   Diagnosis Date   • Basal cell carcinoma 3/16/2020   • Uterine leiomyoma 3/16/2020       No past surgical history on file.    Family History: family history is not on file. Otherwise pertinent FHx was reviewed and unremarkable.     Social History:  reports that she has never smoked. She has never used smokeless tobacco.  Social History     Social History Narrative   • Not on file       Medications:  Available home medication information reviewed.  Medications Prior to Admission   Medication Sig Dispense Refill Last Dose   • apixaban (ELIQUIS) 5 MG tablet tablet Take 1 tablet by mouth Every 12 (Twelve) Hours. Indications: Atrial Fibrillation 60 tablet 5    • Biotin (BIOTIN 5000) 5 MG capsule Take  by mouth.      • Multiple Vitamins-Minerals (MULTIVITAMIN ADULT PO) Take  by mouth.      • propafenone SR (RYTHMOL SR) 225 MG 12 hr capsule Take 1 capsule by mouth 2 (Two) Times a Day. 60 capsule 5        Allergies   Allergen Reactions   • Penicillins Hives   • Percocet [Oxycodone-Acetaminophen] Rash       Objective   Objective     Vital Signs:   Temp:  [97.9 °F (36.6 °C)-98.4 °F (36.9 °C)] 98.4 °F (36.9 °C)  Heart Rate:  [103-280] 128  Resp:  [20] 20  BP: (103-170)/(52-96) 117/70        Physical Exam   Constitutional: Awake, alert  Eyes: PERRLA, sclerae anicteric, no conjunctival injection  HENT: NCAT, mucous membranes moist  Neck: Supple, no thyromegaly, no lymphadenopathy, trachea midline  Respiratory: Clear to auscultation bilaterally, nonlabored respirations   Cardiovascular: Tachycardic, irregularly irregular, palpable pedal pulses bilaterally  Gastrointestinal: Positive bowel sounds, soft, nontender, nondistended  Musculoskeletal: No bilateral ankle edema, no clubbing or cyanosis to extremities  Psychiatric: Appropriate affect, cooperative  Neurologic: Oriented x 3, strength symmetric in all extremities, Cranial Nerves grossly intact to  confrontation, speech clear  Skin: No rashes      Results Reviewed:  I have personally reviewed current lab and radiology data.    Results from last 7 days   Lab Units 04/15/20  0112   WBC 10*3/mm3 15.59*   HEMOGLOBIN g/dL 12.7   HEMATOCRIT % 39.7   PLATELETS 10*3/mm3 319     Results from last 7 days   Lab Units 04/14/20  2033   SODIUM mmol/L 136   POTASSIUM mmol/L 3.2*   CHLORIDE mmol/L 97*   CO2 mmol/L 24.0   BUN mg/dL 11   CREATININE mg/dL 0.90   GLUCOSE mg/dL 256*   CALCIUM mg/dL 9.2   ALT (SGPT) U/L 15   AST (SGOT) U/L 13   TROPONIN T ng/mL <0.010   PROBNP pg/mL 151.4     Estimated Creatinine Clearance: 92 mL/min (by C-G formula based on SCr of 0.9 mg/dL).  Brief Urine Lab Results  (Last result in the past 365 days)      Color   Clarity   Blood   Leuk Est   Nitrite   Protein   CREAT   Urine HCG        03/17/20 0100               Negative     03/17/20 0100 Yellow Clear Negative Negative Negative Negative             Imaging Results (Last 24 Hours)     Procedure Component Value Units Date/Time    XR Chest 1 View [[id number]] Collected:  04/14/20 2156     Updated:  04/14/20 2158    Narrative:       CHEST X-RAY, 4/14/2020      HISTORY:    49-year-old female in the ED complaining of new onset chest pain. History of atrial fibrillation.      TECHNIQUE:  AP portable chest x-ray.    COMPARISON:  *  Chest x-ray, 3/16/2020.    FINDINGS:  Heart size and pulmonary vascularity are within normal limits. The lungs appear clear. No visible pulmonary edema, focal infiltrate or pleural effusion. No change since the prior exam.      Impression:       Negative chest. No change since 3/16/2020.    Signer Name: Nolberto Lwason MD   Signed: 4/14/2020 9:56 PM   Workstation Name: [id number]    [facility]        Results for orders placed during the hospital encounter of 03/16/20   Adult Transthoracic Echo Complete W/ Cont if Necessary Per Protocol    Narrative · Estimated EF = 60%.  · Left ventricular  systolic function is normal.  · Saline test results are negative.          Assessment/Plan   Assessment & Plan     Active Hospital Problems    Diagnosis POA   • **Atrial fibrillation with RVR  [I48.91] Yes   • Hypomagnesemia [E83.42] Unknown   • Hypothyroidism [E03.9] Yes   • Hypokalemia [E87.6] Yes       Plan:    AFIB w/ RVR / chest pain:  - admit to tele  - ECV unsuccessful in ER tonight  - digoxin given in ER  - continue esmolol drip  - blood pressure borderline low, received 1 liter NS bolus in ER and NS @ 125 ml/hr  - will likely need cardiology consult once COVID-19 ruled out  - initial troponin negative, will trend  - cbc, bmp, mag, troponin in am  -EKG not obtained in ED, diagnosed via telemetry.  Will get EKG now and in a.m.  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 fib RVR  -Monitor for signs of infection    DVT prophylaxis:  Eliquis    CODE STATUS:    Code Status and Medical Interventions:   Ordered at: 04/15/20 0059     Code Status:    CPR     Medical Interventions (Level of Support Prior to Arrest):    Full       Admission Status:  I believe this patient meets INPATIENT status due to atrial fibrillation with RVR.  I feel patient’s risk for adverse outcomes and need for care warrant INPATIENT  evaluation and I predict the patient’s care encounter to likely last beyond 2 midnights.      Electronically signed by QUIN Garsia, 04/15/20, 12:17 AM.      Electronically signed by Keke Barry DO at 04/15/20 0155          Emergency Department Notes      Alpesh Rogers MD at 04/14/20 2053      Procedure Orders    1. Critical Care [[id number]] ordered by Alpesh Rogers MD at 04/14/20 2327     2. Electrical Cardioversion [[id number]] ordered by Alpesh Rogers MD at 04/14/20 2327     3. Procedural Sedation [[id number]] ordered by Alpesh Rogers MD at 04/14/20 2328                Subjective   Pt complains of heart palpitations and chest pressure starting a few hours ago. She has had some mild intermittent midsternal chest pain for about 2 days then 2 hours ago this started.  She feels fatigued and anxious as well.  A month ago she had similar complaints and was hospitalized here with new onset AF.  She is currently on Eliquis and Rythmol and this is the first episode since hospitalization.  She reports compliance with meds.  She didn't try anything at home before coming in.          Review of Systems   Constitutional: Negative for fever.   Respiratory: Positive for shortness of breath. Negative for cough.    Cardiovascular: Positive for chest pain and palpitations.   Gastrointestinal: Negative for abdominal pain, diarrhea and vomiting.   Psychiatric/Behavioral: The patient is nervous/anxious.    All other systems reviewed and are negative.      Past Medical History:   Diagnosis Date   • Basal cell carcinoma 3/16/2020   • Uterine leiomyoma 3/16/2020       Allergies   Allergen Reactions   • Penicillins Hives   • Percocet [Oxycodone-Acetaminophen] Rash       No past surgical history on file.    No family history on file.    Social History     Socioeconomic History   • Marital status:      Spouse name: Not on file   • Number of children: Not on file   • Years of education: Not on  file   • Highest education level: Not on file   Tobacco Use   • Smoking status: Never Smoker   • Smokeless tobacco: Never Used           Objective   Physical Exam   Constitutional: She appears well-developed and well-nourished. She is cooperative.  Non-toxic appearance. No distress.   HENT:   Head: Normocephalic and atraumatic.   Mouth/Throat: Oropharynx is clear and moist and mucous membranes are normal.   Eyes: Conjunctivae and EOM are normal. No scleral icterus.   Neck: Normal range of motion. Neck supple.   Cardiovascular: Normal heart sounds. An irregularly irregular rhythm present. Tachycardia present.   No murmur heard.  Pulmonary/Chest: Effort normal and breath sounds normal. No respiratory distress. She has no wheezes. She has no rhonchi. She has no rales.   Abdominal: Soft. Bowel sounds are normal. There is no tenderness. There is no rebound and no guarding.   Musculoskeletal: Normal range of motion.   Neurological: She is alert. She has normal strength.   Skin: Skin is warm and dry. No rash noted.   Psychiatric: Her speech is normal and behavior is normal. Her mood appears anxious.   Nursing note and vitals reviewed.      Critical Care  Performed by: Alpesh Rogers MD  Authorized by: Alpesh Rogers MD     Critical care provider statement:     Critical care time (minutes):  35    Critical care time was exclusive of:  Separately billable procedures and treating other patients    Critical care was necessary to treat or prevent imminent or life-threatening deterioration of the following conditions:  Circulatory failure    Critical care was time spent personally by me on the following activities:  Obtaining history from patient or surrogate, discussions with consultants, ordering and review of laboratory studies, ordering and performing treatments and interventions, ordering and review of radiographic studies, pulse oximetry, re-evaluation of patient's condition, review of old charts, examination  of patient and evaluation of patient's response to treatment    I assumed direction of critical care for this patient from another provider in my specialty: no    Electrical Cardioversion  Date/Time: 4/14/2020 11:27 PM  Performed by: Alpesh Rogers MD  Authorized by: Alpesh Rogers MD     Consent:     Consent obtained:  Verbal and written    Consent given by:  Patient    Risks discussed:  Cutaneous burn and induced arrhythmia    Alternatives discussed:  Rate-control medication  Pre-procedure details:     Cardioversion basis:  Emergent    Rhythm:  Atrial fibrillation    Electrode placement:  Anterior-posterior  Attempt one:     Cardioversion mode:  Synchronous    Waveform:  Biphasic    Shock (Joules):  150    Cardioversion outcome attempt one: transient NSR then back to AF.  Post-procedure details:     Patient status:  Awake    Patient tolerance of procedure:  Tolerated well, no immediate complications  Procedural Sedation  Date/Time: 4/14/2020 11:28 PM  Performed by: Alpseh Rogers MD  Authorized by: Alpesh Rogers MD     Consent:     Consent obtained:  Verbal and written    Consent given by:  Patient    Risks discussed:  Prolonged hypoxia resulting in organ damage, prolonged sedation necessitating reversal and respiratory compromise necessitating ventilatory assistance and intubation  Indications:     Procedure performed:  Cardioversion    Procedure necessitating sedation performed by:  Physician performing sedation    Intended level of sedation:  Moderate (conscious sedation)  Pre-sedation assessment:     NPO status caution: unable to specify NPO status      ASA classification: class 2 - patient with mild systemic disease      Neck mobility: normal      Pre-sedation assessments completed and reviewed: airway patency, cardiovascular function, mental status, nausea/vomiting and respiratory function      History of difficult intubation: no    Immediate pre-procedure details:      Reassessment: Patient reassessed immediately prior to procedure      Reviewed: vital signs and relevant labs/tests      Verified: bag valve mask available, emergency equipment available, intubation equipment available, IV patency confirmed, oxygen available and suction available    Procedure details (see MAR for exact dosages):     Preoxygenation:  Nonrebreather mask    Sedation:  Propofol    Intra-procedure monitoring:  Blood pressure monitoring, cardiac monitor, frequent LOC assessments, continuous pulse oximetry and frequent vital sign checks    Intra-procedure events: none      Total sedation time (minutes):  12  Post-procedure details:     Attendance: Constant attendance by certified staff until patient recovered      Recovery: Patient returned to pre-procedure baseline      Post-sedation assessments completed and reviewed: airway patency, cardiovascular function, mental status, nausea/vomiting and respiratory function      Patient is stable for discharge or admission: yes      Patient tolerance:  Tolerated well, no immediate complications              ED Course         EKG AF RVR.  Long talk at the bedside about AF mgmt.  She has been through DC cardioversion before, didn't work on previous visit.  She talked to family and we talked through risks/benefits and she agrees to try it again.  She is compliant with Eliquis for > 3 weeks and has < 48 hours duration of symptoms.    Cardioverted as above, brief conversion but quickly back to AF.  Hypokalemia noted and replaced.  Labs otherwise benign, CXR negative.  Reviewed previous records from recent admission.  Discussed case with Dr Vieira who recommended IV rate control and time.  He agreed with digoxin as a therapeutic modality after potassium replacement.  Can't do amio like last time as she is on Rythmol.    IV esmolol started, no bolus as she has borderline pressures.  Fluid support givne as well.  Pt tolerated well and as rate improved she began to feel a lot  better.  Serial rechecks, discussed need for admission.                                  MDM  Number of Diagnoses or Management Options  Atrial fibrillation with RVR (CMS/HCC):   Hypokalemia:      Amount and/or Complexity of Data Reviewed  Clinical lab tests: ordered and reviewed  Tests in the radiology section of CPT®:  ordered and reviewed  Decide to obtain previous medical records or to obtain history from someone other than the patient: yes  Review and summarize past medical records: yes  Discuss the patient with other providers: yes  Independent visualization of images, tracings, or specimens: yes    Critical Care  Total time providing critical care: 30-74 minutes      Final diagnoses:   Atrial fibrillation with RVR (CMS/HCC)   Hypokalemia            Alpesh Rogers MD  04/14/20 2330      Electronically signed by Alpesh Rogers MD at 04/14/20 2330     Nadine Robert PCT at 04/14/20 2324        Pt  came in to waiting area inquiring about visiting-informed him of current visitor policy and he was agreeable to this.   is requesting that we do update him with any changes as he lives in [place] and the commute takes a while.  His phone number is [phone].       Nadine Robert PCT  04/14/20 2332      Electronically signed by Nadine Robert PCT at 04/14/20 2332       Operative/Procedure Notes (last 72 hours) (Notes from 04/12/20 1406 through 04/15/20 1406)    No notes of this type exist for this encounter.            Physician Progress Notes (last 72 hours) (Notes from 04/12/20 1406 through 04/15/20 1406)      Vilma Jones MD at 04/15/20 0711                [facility] Medicine Services  ADMISSION FOLLOW-UP NOTE          Patient admitted after midnight, H&P by my partner performed earlier on today's date reviewed.  Interim findings, labs, and charting also reviewed.        [facility] Problem List has been managed and updated to include any new  diagnoses:  Active Hospital Problems    Diagnosis  POA   • **Atrial fibrillation with RVR  [I48.91]  Yes   • Hypomagnesemia [E83.42]  Unknown   • Leukocytosis [D72.829]  Unknown   • Hypothyroidism [E03.9]  Yes   • Hypokalemia [E87.6]  Yes      Resolved Hospital Problems   No resolved problems to display.         ADDITIONAL PLAN:  - detailed assessment and plan form admission reviewed    Sumi Garay is a 49 y.o. female with a medical history significant for hypothyroidism, basal cell carcinoma, uterine leiomyoma and atrial fibrillation on Eliquis (onset 3/16/20 at which time she was admitted to [facility] ICU and was on both cardizem then switched to amiodarone, she was seen by cardiology and d/c'd home on rhythmol and was to continue Eliquis for at least 30 days and repeat EKG with f/u to see Dr. Thompson in 8-12 weeks). Patient presents to the [facility] ED 4/14 for acute onset palpitations. 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 Patient noted to have converted to NSR overnight. Have asked cardiology to formally see for assistance.       Electronically signed by Vilma Jones MD, 04/15/20, 7:33 AM.        Electronically signed by Vilma Jones MD at 04/15/20 1059       Consult Notes (last 72 hours) (Notes from 04/12/20 1406 through 04/15/20 1406)    No notes of this type exist for this encounter.

## 2020-04-15 NOTE — PLAN OF CARE
Patient arrived to room in Afib. Heart rate 120's. Converted to Normal sinus rhythm. Heart rate 70-80's. O2 sat 94-99% on room air. Reported continued pain with potassium infusion. Rate was decreased without relief. Potassium infusion stopped per pt patient request.

## 2020-04-15 NOTE — CONSULTS
Date of  Admission: 4/14/2020  8:19 PM     Consulting physician: Dr. Jones     CC/Reason for consult: afib w/ RVR     HPI:  Sumi Garay is a 49 y.o. female admitted for Atrial fibrillation with RVR (CMS/[facility]) [I48.91]. She is a 49 year old female with a history of PAF currently on Flecainide therapy that presented to the ED last night with complaints of palpitations and chest pressure. She was recently admitted in March 2020 for PAF w/ RVR and eventually converted spontaneously following amiodarone. She was discharged on Rythmol and Eliquis and had plans to follow-up with Dr. Thompson as an outpatient. Last night she was at home and had acute onset of palpitations, chest pressure, sob and presented to the ED. She failed ECV in the ED and was admitted on an esmolol gtt. She converted spontaneously to sinus rhythm overnight. 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 She has had a negative screening. She reports complete resolution of all symptoms.       Cardiac Problem List   1. PAF    - occurred in setting of use of Armor Thyroid for possible hypothyroidism in past.   - hospitalized in March 2020 for afib w/ RVR that occurred 4 days after stopping thyroid supplementation. Failed ECV x 2 and eventually converted on Amiodarone spontaneously.   - was transitioned to Flecainide therapy but had bradycardia and was changed to Rythmol prior to discharge and plans to follow-up with EP   - echo March 2020 with EF 60%   - readmission 4/15/2020 for afib w/ RVR   - CHADSVASC= 1    Patient Active Problem List   Diagnosis   • Atrial fibrillation with RVR    • Hypothyroidism   • Hypokalemia   • Uterine leiomyoma   • Basal cell carcinoma   • Nonsustained ventricular tachycardia versus aberrantly conducted atrial fibrillation   • Hypomagnesemia   • Leukocytosis       Past Medical History:   Diagnosis Date   • Basal cell carcinoma 3/16/2020   • Uterine leiomyoma 3/16/2020      No past surgical  history on file.  Allergies   Allergen Reactions   • Penicillins Hives   • Percocet [Oxycodone-Acetaminophen] Rash      History reviewed. No pertinent family history.     Social History     Socioeconomic History   • Marital status:      Spouse name: Not on file   • Number of children: Not on file   • Years of education: Not on file   • Highest education level: Not on file   Tobacco Use   • Smoking status: Never Smoker   • Smokeless tobacco: Never Used         Current Facility-Administered Medications:   •  apixaban (ELIQUIS) tablet 5 mg, 5 mg, Oral, Q12H, Augusta Simental APRN, 5 mg at 04/15/20 0856  •  aspirin chewable tablet 324 mg, 324 mg, Oral, Once, Alpesh Rogers MD  •  esmolol (BREVIBLOC) 2500 mg/250 mL (10 mg/mL) infusion,  mcg/kg/min, Intravenous, Titrated, Alpesh Rogers MD, Stopped at 04/15/20 0349  •  Magnesium Sulfate 2 gram Bolus, followed by 8 gram infusion (total Mg dose 10 grams)- Mg less than or equal to 1mg/dL, 2 g, Intravenous, PRN **OR** Magnesium Sulfate 2 gram / 50mL Infusion (GIVE X 3 BAGS TO EQUAL 6GM TOTAL DOSE) - Mg 1.1 - 1.5 mg/dl, 2 g, Intravenous, PRN **OR** Magnesium Sulfate 4 gram infusion- Mg 1.6-1.9 mg/dL, 4 g, Intravenous, PRN, Asha, Keke G, DO  •  melatonin tablet 5 mg, 5 mg, Oral, Nightly PRN, Augusta Simental APRN  •  potassium chloride (MICRO-K) CR capsule 40 mEq, 40 mEq, Oral, PRN **OR** potassium chloride (KLOR-CON) packet 40 mEq, 40 mEq, Oral, PRN **OR** potassium chloride 10 mEq in 100 mL IVPB, 10 mEq, Intravenous, Q1H PRN, Augusta Simental APRN  •  [COMPLETED] potassium chloride 10 mEq in 100 mL IVPB, 10 mEq, Intravenous, Once, Stopped at 04/14/20 2321 **AND** [COMPLETED] potassium chloride 10 mEq in 100 mL IVPB, 10 mEq, Intravenous, Once, Last Rate: 100 mL/hr at 04/14/20 2318, 10 mEq at 04/14/20 2318 **AND** potassium chloride 10 mEq in 100 mL IVPB, 10 mEq, Intravenous, Once, Stopped at 04/15/20 0206 **AND** potassium chloride 10 mEq in 100 mL  IVPB, 10 mEq, Intravenous, Once **AND** Potassium, , , PRN, Alpesh Rogers MD  •  propafenone SR (RYTHMOL SR) 12 hr capsule 225 mg, 225 mg, Oral, BID, Augusta Simental, APRN, 225 mg at 04/15/20 0856  •  sodium chloride 0.9 % flush 10 mL, 10 mL, Intravenous, PRN, Alpesh Rogers MD  •  sodium chloride 0.9 % flush 10 mL, 10 mL, Intravenous, Q12H, Augusta Simental, APRN, 10 mL at 04/15/20 0856  •  sodium chloride 0.9 % flush 10 mL, 10 mL, Intravenous, PRN, Augusta Simental, APRN  •  sodium chloride 0.9 % infusion, 125 mL/hr, Intravenous, Continuous, Alpesh Rogers MD, Last Rate: 125 mL/hr at 04/15/20 1149, 125 mL/hr at 04/15/20 1149    Review of Systems:  Review of Systems:  General: no recent weight loss/gain, weakness or fatigue  Skin: no rashes, lumps, or other skin changes  HEENT: no dizziness, lightheadedness, or vision changes  Respiratory: no cough or hemoptysis  Cardiovascular: + palpitations, and tachycardia  Gastrointestinal: no black/tarry stools or diarrhea  Urinary: no change in frequency or urgency  Peripheral Vascular: no claudication or leg cramps  Musculoskeletal: no muscle or joint pain/stiffness  Psychiatric: no depression or excessive stress  Neurological: no sensory or motor loss, no syncope  Hematologic: no anemia, easy bruising or bleeding  Endocrine: no thyroid problems, nor heat or cold intolerance      Physical Exam - Vitals  Vitals:    04/15/20 0839 04/15/20 0900 04/15/20 1100 04/15/20 1216   BP: 105/65   99/68   BP Location: Left arm   Left arm   Patient Position: Lying   Lying   Pulse: 80 101 75 73   Resp: 16   16   Temp: 97.3 °F (36.3 °C)   97.6 °F (36.4 °C)   TempSrc: Oral   Oral   SpO2:       Weight:       Height:           Physical Exam:  General-Well Nourished, Well developed  Eyes - PERRLA  Neck- supple, No mass  CV- regular rate and rhythm, no MRG  Lung- clear bilaterally  Abd- soft, +BS  Musculoskeletal - Norm strength and range of motion  Skin- warm and dry  Neuro -  Alert & Oriented x 3, appropriate mood.      Cardiographics  (I have reviewed the EKG/Tracing from today and listed the findings below)    Telemetry: sinus 80s   ECG:  afib with ;       Labs:   Results Review:     I reviewed the patient's new clinical results.    Results from last 7 days   Lab Units 04/15/20  0112   WBC 10*3/mm3 15.59*   HEMOGLOBIN g/dL 12.7   HEMATOCRIT % 39.7   PLATELETS 10*3/mm3 319     Results from last 7 days   Lab Units 04/15/20  0635 04/15/20  0112 04/14/20  2033   SODIUM mmol/L  --  142 136   POTASSIUM mmol/L 4.4 4.5 3.2*   CHLORIDE mmol/L  --  108* 97*   CO2 mmol/L  --  23.0 24.0   BUN mg/dL  --  12 11   CREATININE mg/dL  --  0.65 0.90   CALCIUM mg/dL  --  9.2 9.2   BILIRUBIN mg/dL  --   --  0.2   ALK PHOS U/L  --   --  97   ALT (SGPT) U/L  --   --  15   AST (SGOT) U/L  --   --  13   GLUCOSE mg/dL  --  164* 256*     Results from last 7 days   Lab Units 04/15/20  0635 04/15/20  0112   SODIUM mmol/L  --  142   POTASSIUM mmol/L 4.4 4.5   CHLORIDE mmol/L  --  108*   CO2 mmol/L  --  23.0   BUN mg/dL  --  12   CREATININE mg/dL  --  0.65   GLUCOSE mg/dL  --  164*   CALCIUM mg/dL  --  9.2         Lab Results   Lab Value Date/Time    TROPONINT <0.010 04/15/2020 1019    TROPONINT <0.010 04/15/2020 0635    TROPONINT <0.010 04/15/2020 0112    TROPONINT <0.010 04/14/2020 2033    TROPONINT <0.010 03/17/2020 0405    TROPONINT <0.010 03/16/2020 2200     Results from last 7 days   Lab Units 04/15/20  0112   TSH uIU/mL 1.710         Results from last 7 days   Lab Units 04/14/20 2033   PROBNP pg/mL 151.4          Assessment:        Atrial fibrillation with RVR     Hypothyroidism    Hypokalemia    Hypomagnesemia    Leukocytosis         Plan:     1. Paroxysmal Atrial Fibrillation  - occurred in setting of use of Armor Thyroid for possible hypothyroidism initially in March 2020.   - hospitalized in March 2020 for afib w/ RVR that occurred 4 days after stopping thyroid supplementation. Failed ECV x 2 and  eventually converted on Amiodarone spontaneously.   - was transitioned to Rythmol therapy prior to discharge and plans to follow-up with EP   - echo March 2020 with EF 60%   - readmission last night with failed ECV in ED. Started on esmolol gtt with spontaneous conversion  - CHADSVASC= 1; on Eliquis 5mg BID   - discussed options with patient at length and will plan to increase Rythmol to 325mg BID and have instructed patient to take an extra dose as needed for breakthrough episodes. Will also plan to add Lopressor 12.5mg BID and titrate up if able.   - in the long run we will plan on pulmonary vein ablation once COVID 19 restrictions have been lifted.     2. COVID rule out   - negative test     3. Hypokalemia   - replacement per protocol.       Patient is clear for discharge from an EP standpoint this afternoon. The plan was discussed with the patient by both Dr. Gonzalez and myself.     She will be discharged on Rythmol 325mg BID with extra dose PRN for breakthrough episodes and Lopressor 12.5mg BID. Orders have been placed. She will have video visit with Dr. Gonzalez in 3-4 weeks. Continue Eliquis. Please call if any questions.     Electronically signed by YEISON Wilson, 04/15/20, 11:43 AM.

## 2020-04-15 NOTE — ED PROVIDER NOTES
Subjective   Pt complains of heart palpitations and chest pressure starting a few hours ago. She has had some mild intermittent midsternal chest pain for about 2 days then 2 hours ago this started.  She feels fatigued and anxious as well.  A month ago she had similar complaints and was hospitalized here with new onset AF.  She is currently on Eliquis and Rythmol and this is the first episode since hospitalization.  She reports compliance with meds.  She didn't try anything at home before coming in.          Review of Systems   Constitutional: Negative for fever.   Respiratory: Positive for shortness of breath. Negative for cough.    Cardiovascular: Positive for chest pain and palpitations.   Gastrointestinal: Negative for abdominal pain, diarrhea and vomiting.   Psychiatric/Behavioral: The patient is nervous/anxious.    All other systems reviewed and are negative.      Past Medical History:   Diagnosis Date   • Basal cell carcinoma 3/16/2020   • Uterine leiomyoma 3/16/2020       Allergies   Allergen Reactions   • Penicillins Hives   • Percocet [Oxycodone-Acetaminophen] Rash       No past surgical history on file.    No family history on file.    Social History     Socioeconomic History   • Marital status:      Spouse name: Not on file   • Number of children: Not on file   • Years of education: Not on file   • Highest education level: Not on file   Tobacco Use   • Smoking status: Never Smoker   • Smokeless tobacco: Never Used           Objective   Physical Exam   Constitutional: She appears well-developed and well-nourished. She is cooperative.  Non-toxic appearance. No distress.   HENT:   Head: Normocephalic and atraumatic.   Mouth/Throat: Oropharynx is clear and moist and mucous membranes are normal.   Eyes: Conjunctivae and EOM are normal. No scleral icterus.   Neck: Normal range of motion. Neck supple.   Cardiovascular: Normal heart sounds. An irregularly irregular rhythm present. Tachycardia present.   No  murmur heard.  Pulmonary/Chest: Effort normal and breath sounds normal. No respiratory distress. She has no wheezes. She has no rhonchi. She has no rales.   Abdominal: Soft. Bowel sounds are normal. There is no tenderness. There is no rebound and no guarding.   Musculoskeletal: Normal range of motion.   Neurological: She is alert. She has normal strength.   Skin: Skin is warm and dry. No rash noted.   Psychiatric: Her speech is normal and behavior is normal. Her mood appears anxious.   Nursing note and vitals reviewed.      Critical Care  Performed by: Alpesh Rogers MD  Authorized by: Alpesh Rogers MD     Critical care provider statement:     Critical care time (minutes):  35    Critical care time was exclusive of:  Separately billable procedures and treating other patients    Critical care was necessary to treat or prevent imminent or life-threatening deterioration of the following conditions:  Circulatory failure    Critical care was time spent personally by me on the following activities:  Obtaining history from patient or surrogate, discussions with consultants, ordering and review of laboratory studies, ordering and performing treatments and interventions, ordering and review of radiographic studies, pulse oximetry, re-evaluation of patient's condition, review of old charts, examination of patient and evaluation of patient's response to treatment    I assumed direction of critical care for this patient from another provider in my specialty: no    Electrical Cardioversion  Date/Time: 4/14/2020 11:27 PM  Performed by: Alpesh Rogers MD  Authorized by: Alpesh Rogers MD     Consent:     Consent obtained:  Verbal and written    Consent given by:  Patient    Risks discussed:  Cutaneous burn and induced arrhythmia    Alternatives discussed:  Rate-control medication  Pre-procedure details:     Cardioversion basis:  Emergent    Rhythm:  Atrial fibrillation    Electrode placement:   Anterior-posterior  Attempt one:     Cardioversion mode:  Synchronous    Waveform:  Biphasic    Shock (Joules):  150    Cardioversion outcome attempt one: transient NSR then back to AF.  Post-procedure details:     Patient status:  Awake    Patient tolerance of procedure:  Tolerated well, no immediate complications  Procedural Sedation  Date/Time: 4/14/2020 11:28 PM  Performed by: Alpesh Rogers MD  Authorized by: Alpesh Rogers MD     Consent:     Consent obtained:  Verbal and written    Consent given by:  Patient    Risks discussed:  Prolonged hypoxia resulting in organ damage, prolonged sedation necessitating reversal and respiratory compromise necessitating ventilatory assistance and intubation  Indications:     Procedure performed:  Cardioversion    Procedure necessitating sedation performed by:  Physician performing sedation    Intended level of sedation:  Moderate (conscious sedation)  Pre-sedation assessment:     NPO status caution: unable to specify NPO status      ASA classification: class 2 - patient with mild systemic disease      Neck mobility: normal      Pre-sedation assessments completed and reviewed: airway patency, cardiovascular function, mental status, nausea/vomiting and respiratory function      History of difficult intubation: no    Immediate pre-procedure details:     Reassessment: Patient reassessed immediately prior to procedure      Reviewed: vital signs and relevant labs/tests      Verified: bag valve mask available, emergency equipment available, intubation equipment available, IV patency confirmed, oxygen available and suction available    Procedure details (see MAR for exact dosages):     Preoxygenation:  Nonrebreather mask    Sedation:  Propofol    Intra-procedure monitoring:  Blood pressure monitoring, cardiac monitor, frequent LOC assessments, continuous pulse oximetry and frequent vital sign checks    Intra-procedure events: none      Total sedation time (minutes):   12  Post-procedure details:     Attendance: Constant attendance by certified staff until patient recovered      Recovery: Patient returned to pre-procedure baseline      Post-sedation assessments completed and reviewed: airway patency, cardiovascular function, mental status, nausea/vomiting and respiratory function      Patient is stable for discharge or admission: yes      Patient tolerance:  Tolerated well, no immediate complications               ED Course         EKG AF RVR.  Long talk at the bedside about AF mgmt.  She has been through DC cardioversion before, didn't work on previous visit.  She talked to family and we talked through risks/benefits and she agrees to try it again.  She is compliant with Eliquis for > 3 weeks and has < 48 hours duration of symptoms.    Cardioverted as above, brief conversion but quickly back to AF.  Hypokalemia noted and replaced.  Labs otherwise benign, CXR negative.  Reviewed previous records from recent admission.  Discussed case with Dr Vieira who recommended IV rate control and time.  He agreed with digoxin as a therapeutic modality after potassium replacement.  Can't do amio like last time as she is on Rythmol.    IV esmolol started, no bolus as she has borderline pressures.  Fluid support givne as well.  Pt tolerated well and as rate improved she began to feel a lot better.  Serial rechecks, discussed need for admission.                                  MDM  Number of Diagnoses or Management Options  Atrial fibrillation with RVR (CMS/[facility]):   Hypokalemia:      Amount and/or Complexity of Data Reviewed  Clinical lab tests: ordered and reviewed  Tests in the radiology section of CPT®: ordered and reviewed  Decide to obtain previous medical records or to obtain history from someone other than the patient: yes  Review and summarize past medical records: yes  Discuss the patient with other providers: yes  Independent visualization of images, tracings, or specimens:  yes    Critical Care  Total time providing critical care: 30-74 minutes      Final diagnoses:   Atrial fibrillation with RVR (CMS/[facility])   Hypokalemia            Alpesh Rogers MD  04/14/20 7006

## 2020-04-15 NOTE — PROGRESS NOTES
[facility]  ADMISSION FOLLOW-UP NOTE          Patient admitted after midnight, H&P by my partner performed earlier on today's date reviewed.  Interim findings, labs, and charting also reviewed.        The [facility] Hospital Problem List has been managed and updated to include any new diagnoses:  Active Hospital Problems    Diagnosis  POA   • **Atrial fibrillation with RVR  [I48.91]  Yes   • Hypomagnesemia [E83.42]  Unknown   • Leukocytosis [D72.829]  Unknown   • Hypothyroidism [E03.9]  Yes   • Hypokalemia [E87.6]  Yes      Resolved Hospital Problems   No resolved problems to display.         ADDITIONAL PLAN:  - detailed assessment and plan form admission reviewed    Sumi Garay is a 49 y.o. female with a medical history significant for hypothyroidism, basal cell carcinoma, uterine leiomyoma and atrial fibrillation on Eliquis (onset 3/16/20 at which time she was admitted to [facility] ICU and was on both cardizem then switched to amiodarone, she was seen by cardiology and d/c'd home on rhythmol and was to continue Eliquis for at least 30 days and repeat EKG with f/u to see Dr. Thompson in 8-12 weeks). Patient presents to the [facility] ED 4/14 for acute onset palpitations. Patient's works as a nursing director in [place]- for this reason patient was placed on 6A for covid rule out, in house testing sent 4/15 and returned as negative. Patient noted to have converted to NSR overnight. Have asked cardiology to formally see for assistance.       Electronically signed by Vilma Jones MD, 04/15/20, 7:33 AM.

## 2020-04-15 NOTE — H&P
[facility] Medicine Services  HISTORY AND PHYSICAL    Patient Name: Sumi Garay  : 1970  MRN: [id number]  Primary Care Physician: Rut Khanna MD  Date of admission: 2020      Subjective   Subjective     Chief Complaint:  Heart palpitations    HPI:  Sumi Garay is a 49 y.o. female with a medical history significant for hypothyroidism, basal cell carcinoma, uterine leiomyoma and atrial fibrillation on Eliquis (onset 3/16/20 at which time she was admitted to [facility] ICU and was on both cardizem then switched to amiodarone, she was seen by cardiology and d/c'd home on rhythmol and was to continue Eliquis for at least 30 days and repeat EKG with f/u to see Dr. Thompson in 8-12 weeks).  Patient states that tonight seated at home she had acute onset palpitations, chest pressure, shortness of breath, nausea, lightheaded/dizzy sensation, and bilateral hand numbness.  She says symptoms were similar to prior episode of atrial fibrillation.  She called EMS and was brought to the ED for further evaluation and care.  Patient denies cough, fever.  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        COVID-19 RISK SCREEN     1. Has the patient had close contact without PPE with a lab confirmed COVID-19 (+) person or a person under investigation (PUI) for COVID-19 infection?  -- no    2. Has the patient had respiratory symptoms, worsened/new cough and/or SOA, unexplained fever, or sudden loss of smell and/or taste in the past 7 days? --  no, but transient SOB with Afib    3. Does the patient have baseline higher exposure risk such as working in healthcare field, currently residing in healthcare facility, or ongoing hemodialysis?  --  Yes         Review of Systems   Constitutional: Negative.    HENT: Negative.    Eyes: Negative.    Respiratory: Positive for chest tightness and shortness of breath.       Cardiovascular: Positive for palpitations.   Gastrointestinal: Positive for nausea. Negative for vomiting.   Endocrine: Negative.    Genitourinary: Negative.    Skin: Negative.    Allergic/Immunologic: Negative.    Neurological: Positive for dizziness and light-headedness.   Hematological: Negative.    Psychiatric/Behavioral: Negative.         All other systems reviewed and are negative.     Personal History     Past Medical History:   Diagnosis Date   • Basal cell carcinoma 3/16/2020   • Uterine leiomyoma 3/16/2020       No past surgical history on file.    Family History: family history is not on file. Otherwise pertinent FHx was reviewed and unremarkable.     Social History:  reports that she has never smoked. She has never used smokeless tobacco.  Social History     Social History Narrative   • Not on file       Medications:  Available home medication information reviewed.  Medications Prior to Admission   Medication Sig Dispense Refill Last Dose   • apixaban (ELIQUIS) 5 MG tablet tablet Take 1 tablet by mouth Every 12 (Twelve) Hours. Indications: Atrial Fibrillation 60 tablet 5    • Biotin (BIOTIN 5000) 5 MG capsule Take  by mouth.      • Multiple Vitamins-Minerals (MULTIVITAMIN ADULT PO) Take  by mouth.      • propafenone SR (RYTHMOL SR) 225 MG 12 hr capsule Take 1 capsule by mouth 2 (Two) Times a Day. 60 capsule 5        Allergies   Allergen Reactions   • Penicillins Hives   • Percocet [Oxycodone-Acetaminophen] Rash       Objective   Objective     Vital Signs:   Temp:  [97.9 °F (36.6 °C)-98.4 °F (36.9 °C)] 98.4 °F (36.9 °C)  Heart Rate:  [103-280] 128  Resp:  [20] 20  BP: (103-170)/(52-96) 117/70        Physical Exam   Constitutional: Awake, alert  Eyes: PERRLA, sclerae anicteric, no conjunctival injection  HENT: NCAT, mucous membranes moist  Neck: Supple, no thyromegaly, no lymphadenopathy, trachea midline  Respiratory: Clear to auscultation bilaterally, nonlabored respirations   Cardiovascular:  Tachycardic, irregularly irregular, palpable pedal pulses bilaterally  Gastrointestinal: Positive bowel sounds, soft, nontender, nondistended  Musculoskeletal: No bilateral ankle edema, no clubbing or cyanosis to extremities  Psychiatric: Appropriate affect, cooperative  Neurologic: Oriented x 3, strength symmetric in all extremities, Cranial Nerves grossly intact to confrontation, speech clear  Skin: No rashes      Results Reviewed:  I have personally reviewed current lab and radiology data.    Results from last 7 days   Lab Units 04/15/20  0112   WBC 10*3/mm3 15.59*   HEMOGLOBIN g/dL 12.7   HEMATOCRIT % 39.7   PLATELETS 10*3/mm3 319     Results from last 7 days   Lab Units 04/14/20  2033   SODIUM mmol/L 136   POTASSIUM mmol/L 3.2*   CHLORIDE mmol/L 97*   CO2 mmol/L 24.0   BUN mg/dL 11   CREATININE mg/dL 0.90   GLUCOSE mg/dL 256*   CALCIUM mg/dL 9.2   ALT (SGPT) U/L 15   AST (SGOT) U/L 13   TROPONIN T ng/mL <0.010   PROBNP pg/mL 151.4     Estimated Creatinine Clearance: 92 mL/min (by C-G formula based on SCr of 0.9 mg/dL).  Brief Urine Lab Results  (Last result in the past 365 days)      Color   Clarity   Blood   Leuk Est   Nitrite   Protein   CREAT   Urine HCG        03/17/20 0100               Negative     03/17/20 0100 Yellow Clear Negative Negative Negative Negative             Imaging Results (Last 24 Hours)     Procedure Component Value Units Date/Time    XR Chest 1 View [[id number]] Collected:  04/14/20 2156     Updated:  04/14/20 2158    Narrative:       CHEST X-RAY, 4/14/2020      HISTORY:    49-year-old female in the ED complaining of new onset chest pain. History of atrial fibrillation.      TECHNIQUE:  AP portable chest x-ray.    COMPARISON:  *  Chest x-ray, 3/16/2020.    FINDINGS:  Heart size and pulmonary vascularity are within normal limits. The lungs appear clear. No visible pulmonary edema, focal infiltrate or pleural effusion. No change since the prior exam.      Impression:       Negative chest.  No change since 3/16/2020.    Signer Name: Nolberto Lawson MD   Signed: 4/14/2020 9:56 PM   Workstation Name: [id number]    [facility]        Results for orders placed during the hospital encounter of 03/16/20   Adult Transthoracic Echo Complete W/ Cont if Necessary Per Protocol    Narrative · Estimated EF = 60%.  · Left ventricular systolic function is normal.  · Saline test results are negative.          Assessment/Plan   Assessment & Plan     Active Hospital Problems    Diagnosis POA   • **Atrial fibrillation with RVR  [I48.91] Yes   • Hypomagnesemia [E83.42] Unknown   • Hypothyroidism [E03.9] Yes   • Hypokalemia [E87.6] Yes       Plan:    AFIB w/ RVR / chest pain:  - admit to tele  - ECV unsuccessful in ER tonight  - digoxin given in ER  - continue esmolol drip  - blood pressure borderline low, received 1 liter NS bolus in ER and NS @ 125 ml/hr  - will likely need cardiology consult once COVID-19 ruled out  - initial troponin negative, will trend  - cbc, bmp, mag, troponin in am  -EKG not obtained in ED, diagnosed via telemetry.  Will get EKG now and in a.m.  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 fib RVR  -Monitor for signs of infection    DVT prophylaxis:   Eliquis    CODE STATUS:    Code Status and Medical Interventions:   Ordered at: 04/15/20 0059     Code Status:    CPR     Medical Interventions (Level of Support Prior to Arrest):    Full       Admission Status:  I believe this patient meets INPATIENT status due to atrial fibrillation with RVR.  I feel patient’s risk for adverse outcomes and need for care warrant INPATIENT evaluation and I predict the patient’s care encounter to likely last beyond 2 midnights.      Electronically signed by QUIN Garsia, 04/15/20, 12:17 AM.

## 2020-04-15 NOTE — ED NOTES
Pt  came in to waiting area inquiring about visiting-informed him of current visitor policy and he was agreeable to this.   is requesting that we do update him with any changes as he lives in [place] and the commute takes a while.  His phone number is [phone].       Nadine Robert, PCT  04/14/20 2104

## 2020-04-15 NOTE — DISCHARGE SUMMARY
[facility] Medicine Services  SAME DAY ADMISSION & DISCHARGE    Patient Name: Sumi Garay  : 1970  MRN: [id number]    Date of Admission and Discharge: 2020    Primary Care Physician: Rut Khanna MD  Consults     Date and Time Order Name Status Description    4/15/2020 0958 Inpatient Cardiology Consult Completed     3/17/2020 0030 Inpatient Cardiology Consult Completed           Subjective   Presentation and Brief Hospital Summary     Chief Complaint:  Atrial fibrillation with RVR (CMS/HCC) [I48.91]    Active Hospital Problems    Diagnosis  POA   • **Atrial fibrillation with RVR  [I48.91]  Yes   • Hypomagnesemia [E83.42]  Unknown   • Leukocytosis [D72.829]  Unknown   • Hypothyroidism [E03.9]  Yes   • Hypokalemia [E87.6]  Yes      Resolved Hospital Problems   No resolved problems to display.         HPI and Brief Hospital Course:  Sumi Garay is a 49 y.o. female with a medical history significant for hypothyroidism, basal cell carcinoma, uterine leiomyoma and atrial fibrillation on Eliquis (onset 3/16/20 at which time she was admitted to [facility] ICU and was on both cardizem then switched to amiodarone, she was seen by cardiology and d/c'd home on rhythmol and was to continue Eliquis for at least 30 days and repeat EKG with f/u to see Dr. Thompson in 8-12 weeks). Patient presents to the [facility] ED  for acute onset palpitations. 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 Patient noted to have converted to NSR overnight. Have asked cardiology to formally see for assistance. Ultimately patient was evaluated by cardiology and placed on medical therapy with rhytmol 325 BID, metoprolol and eliquis. She was instructed to take an extra dose of rhytmol for breakthrough episodes as needed and will f/u via video visit in 3 weeks. Patient was noted  to be stable and in sinus rhythm at time of discharge.     • (-) negative COVID-19 test result  • The test is not perfect, so there is a chance it could be falsely negative or the virus level for too low for detection due to being very early in their infectious process.    • For this reason, Sumi Garay strongly encouraged to practice the safest standards in protecting their health and others given the endemic concerns.  Patient was advised to:  o Practice social distancing in the community  o Continue to wash their hands frequently with soap and hot water, and cover their cough.   • If patient develops non-emergent symptoms such as mild increased shortness of breath, fever, cough, or for other questions, Sumi Garay was asked to please call their primary care physician’s office or the [place] hotline at [phone].  • Questions were engaged and answered to the best of my ability, patient expressed verbal understanding of their test results and my advice.         Review of Systems   See H&P  All other systems reviewed and are negative.    Personal History     Past Medical History:   Diagnosis Date   • Basal cell carcinoma 3/16/2020   • Uterine leiomyoma 3/16/2020       No past surgical history on file.    Family History: family history is not on file.     Social History:  reports that she has never smoked. She has never used smokeless tobacco.    Allergies:    Allergies   Allergen Reactions   • Penicillins Hives   • Percocet [Oxycodone-Acetaminophen] Rash       Objective   Objective Findings     Vital Signs:   Temp:  [97.3 °F (36.3 °C)-98.4 °F (36.9 °C)] 97.9 °F (36.6 °C)  Heart Rate:  [] 73  Resp:  [16-20] 18  BP: ()/(46-96) 101/62        Physical Exam (if not performed and documented at time of presentation on same date):   See h&P    Results Reviewed:  I have personally reviewed current lab, radiology, and data and agree.    Results from last 7 days   Lab Units 04/15/20  6490      WBC 10*3/mm3 15.59*   HEMOGLOBIN g/dL 12.7   HEMATOCRIT % 39.7   PLATELETS 10*3/mm3 319     Results from last 7 days   Lab Units 04/15/20  0635 04/15/20  0112 04/14/20 2033   SODIUM mmol/L  --  142 136   POTASSIUM mmol/L 4.4 4.5 3.2*   CHLORIDE mmol/L  --  108* 97*   CO2 mmol/L  --  23.0 24.0   BUN mg/dL  --  12 11   CREATININE mg/dL  --  0.65 0.90   GLUCOSE mg/dL  --  164* 256*   CALCIUM mg/dL  --  9.2 9.2   ALT (SGPT) U/L  --   --  15   AST (SGOT) U/L  --   --  13   PROBNP pg/mL  --   --  151.4     No results found for: PHART, PCO2  Imaging Results (Last 24 Hours)     Procedure Component Value Units Date/Time    XR Chest 1 View [[id number]] Collected:  04/14/20 2156     Updated:  04/14/20 2158    Narrative:       CHEST X-RAY, 4/14/2020      HISTORY:    49-year-old female in the ED complaining of new onset chest pain. History of atrial fibrillation.      TECHNIQUE:  AP portable chest x-ray.    COMPARISON:  *  Chest x-ray, 3/16/2020.    FINDINGS:  Heart size and pulmonary vascularity are within normal limits. The lungs appear clear. No visible pulmonary edema, focal infiltrate or pleural effusion. No change since the prior exam.      Impression:       Negative chest. No change since 3/16/2020.    Signer Name: Nolberto Lawson MD   Signed: 4/14/2020 9:56 PM   Workstation Name: [facility]    [facility]        Results for orders placed during the hospital encounter of 03/16/20   Adult Transthoracic Echo Complete W/ Cont if Necessary Per Protocol    Narrative · Estimated EF = 60%.  · Left ventricular systolic function is normal.  · Saline test results are negative.            Discharge Details        Discharge Medications      New Medications      Instructions Start Date   metoprolol tartrate 25 MG tablet  Commonly known as:  LOPRESSOR   12.5 mg, Oral, 2 Times Daily      propafenone  MG 12 hr capsule  Commonly known as:  Rythmol SR   325 mg, Oral, 2 Times Daily         Continue  These Medications      Instructions Start Date   apixaban 5 MG tablet tablet  Commonly known as:  ELIQUIS   5 mg, Oral, Every 12 Hours Scheduled      Biotin 5000 5 MG capsule  Generic drug:  Biotin   Oral      MULTIVITAMIN ADULT PO   Oral               Discharge Disposition:  Home or Self Care    Discharge Diet:    regular  Discharge Activity:  as tolerated  • (-) negative COVID-19 test result  • The test is not perfect, so there is a chance it could be falsely negative or the virus level for too low for detection due to being very early in their infectious process.    • For this reason, Sumi Garay strongly encouraged to practice the safest standards in protecting their health and others given the endemic concerns.  Patient was advised to:  o Practice social distancing in the community  o Continue to wash their hands frequently with soap and hot water, and cover their cough.   • If patient develops non-emergent symptoms such as mild increased shortness of breath, fever, cough, or for other questions, Sumi Garay was asked to please call their primary care physician’s office or the [place] hotline at [phone].  • Questions were engaged and answered to the best of my ability, patient expressed verbal understanding of their test results and my advice.      CODE STATUS:   Code Status and Medical Interventions:   Ordered at: 04/15/20 0059     Code Status:    CPR     Medical Interventions (Level of Support Prior to Arrest):    Full         Future Appointments   Date Time Provider Department Center   5/27/2020  1:00 PM [facility] None       Additional Instructions for the Follow-ups that You Need to Schedule     Discharge Follow-up with PCP   As directed       Currently Documented PCP:    Rut Khanna MD    PCP Phone Number:    [phone]     Follow Up Details:  1- 2 weeks         Discharge Follow-up with Specified Provider: Dr. Gonzalez- video visit; 3 Weeks   As  directed      To:  Dr. Gonzalez- video visit    Follow Up:  3 Weeks               Time Spent on Discharge:  35 minutes    Electronically signed by Vilma Jones MD, 04/15/20, 5:46 PM.

## 2020-04-15 NOTE — PROGRESS NOTES
Discharge Planning Assessment  [facility]     Patient Name: Carlos Garay  MRN: [id number]  Today's Date: 4/15/2020    Admit Date: 4/14/2020    Discharge Needs Assessment     Row Name 04/15/20 9222       Living Environment    Lives With  spouse    Current Living Arrangements  home/apartment/condo    Primary Care Provided by  self    Provides Primary Care For  no one    Family Caregiver if Needed  spouse    Quality of Family Relationships  helpful;involved    Able to Return to Prior Arrangements  yes       Resource/Environmental Concerns    Resource/Environmental Concerns  none    Transportation Concerns  car, none       Transition Planning    Patient/Family Anticipates Transition to  home;home with family    Patient/Family Anticipated Services at Transition  none    Transportation Anticipated  family or friend will provide       Discharge Needs Assessment    Readmission Within the Last 30 Days  no previous admission in last 30 days    Concerns to be Addressed  discharge planning    Equipment Currently Used at Home  none    Anticipated Changes Related to Illness  none    Equipment Needed After Discharge  none        Discharge Plan     Row Name 04/15/20 6963       Plan    Plan  Home at discharge     Patient/Family in Agreement with Plan  yes    Plan Comments  Talked with patient over the phone as her covid-19 was pending - Patient states she lives with her spouse in [facility]. PCP confirmed to be Rut Khanna. She is independent of ADL's and denies the use of any DME at home. Her goal is to return home upon discharge, no needs identified at this time- [facility] - carlos [phone]     Final Discharge Disposition Code  01 - home or self-care        Destination      Coordination has not been started for this encounter.      Durable Medical Equipment      Coordination has not been started for this encounter.      Dialysis/Infusion      Coordination has not been started for this encounter.      Home  Medical Care      Coordination has not been started for this encounter.      Therapy      Coordination has not been started for this encounter.      Community Resources      Coordination has not been started for this encounter.          Demographic Summary     Row Name 04/15/20 1332       General Information    Admission Type  inpatient    Arrived From  home    Referral Source  admission list    Reason for Consult  discharge planning    Preferred Language  English       Contact Information    Permission Granted to Share Info With          Functional Status     Row Name 04/15/20 1332       Functional Status    Usual Activity Tolerance  good    Current Activity Tolerance  good       Functional Status, IADL    Medications  independent    Meal Preparation  independent    Housekeeping  independent    Laundry  independent    Shopping  independent       Mental Status    General Appearance WDL  WDL        Psychosocial    No documentation.       Abuse/Neglect    No documentation.       Legal    No documentation.       Substance Abuse    No documentation.       Patient Forms    No documentation.           Sumi Dietrich RN

## 2020-04-16 ENCOUNTER — TELEPHONE (OUTPATIENT)
Dept: CARDIOLOGY | Facility: CLINIC | Age: 50
End: 2020-04-16

## 2020-04-16 ENCOUNTER — READMISSION MANAGEMENT (OUTPATIENT)
Dept: CALL CENTER | Facility: HOSPITAL | Age: 50
End: 2020-04-16

## 2020-04-16 NOTE — TELEPHONE ENCOUNTER
I dont think there K level had a lot to do with her AF.      Advise her to eat tomatoes and kiwis.

## 2020-04-16 NOTE — TELEPHONE ENCOUNTER
Patient notified.   Consent: The patient's consent was obtained including but not limited to risks of crusting, scabbing, blistering, scarring, darker or lighter pigmentary change, recurrence, incomplete removal and infection. Render Note In Bullet Format When Appropriate: Yes Duration Of Freeze Thaw-Cycle (Seconds): 3 Detail Level: Detailed Number Of Freeze-Thaw Cycles: 1 freeze-thaw cycle Post-Care Instructions: I reviewed with the patient in detail post-care instructions. Patient is to wear sunprotection, and avoid picking at any of the treated lesions. Pt may apply Vaseline to crusted or scabbing areas.

## 2020-04-16 NOTE — OUTREACH NOTE
Medical Week 1 Survey      Responses   [facility] patient discharged from?  [facility]   COVID-19 Test Status  Negative   Does the patient have one of the following disease processes/diagnoses(primary or secondary)?  Other   Is there a successful TCM telephone encounter documented?  No   Week 1 attempt successful?  Yes   Call start time  1003   Call end time  1005   Discharge diagnosis  Atrial fibrillation with RVR     Medication alerts for this patient  rhythmol   Meds reviewed with patient/caregiver?  Yes   Is the patient having any side effects they believe may be caused by any medication additions or changes?  No   Does the patient have all medications ordered at discharge?  Yes   Is the patient taking all medications as directed (includes completed medication regime)?  Yes   Does the patient have a primary care provider?   Yes   Does the patient have an appointment with their PCP within 7 days of discharge?  Yes   Has the patient kept scheduled appointments due by today?  N/A   Has home health visited the patient within 72 hours of discharge?  N/A   Psychosocial issues?  No   Did the patient receive a copy of their discharge instructions?  Yes   What is the patient's perception of their health status since discharge?  Improving   Is the patient/caregiver able to teach back signs and symptoms related to disease process for when to call PCP?  Yes   Is the patient/caregiver able to teach back signs and symptoms related to disease process for when to call 911?  Yes   Is the patient/caregiver able to teach back the hierarchy of who to call/visit for symptoms/problems? PCP, Specialist, Home health nurse, Urgent Care, ED, 911  Yes   Week 1 call completed?  Yes   Graduated  Yes   Did the patient feel the follow up calls were helpful during their recovery period?  Yes   Was the number of calls appropriate?  Yes   Graduated/Revoked comments  Patient is a nurse.  She is doing well and has no problems or questions.   Brief call.          Lis De La Cruz RN

## 2020-04-16 NOTE — OUTREACH NOTE
Prep Survey      Responses   [facility] patient discharged from?  [facility]   Is LACE score < 7 ?  No   Eligibility  Readm Mgmt   Discharge diagnosis  Atrial fibrillation with RVR     COVID-19 Test Status  Negative   Does the patient have one of the following disease processes/diagnoses(primary or secondary)?  Other   Does the patient have Home health ordered?  No   Is there a DME ordered?  No   Medication alerts for this patient  rhythmol   Prep survey completed?  Yes          Tanya Galvez RN

## 2020-04-16 NOTE — TELEPHONE ENCOUNTER
Patient called stating that both times when she came to the ER for a-fib her potassium was low and had to be replaced. She wants to know if she needs to be on a potassium replacement outpatient.

## 2020-04-23 NOTE — PAYOR COMM NOTE
"Sumi Elmore (49 y.o. Female) Att: Milla     Date of Birth Social Security Number Address Home Phone MRN    1970  [address] [place] [address] [phone] [id number]    Holiness Marital Status          Religion        Admission Date Admission Type Admitting Provider Attending Provider Department, Room/Bed    20 Emergency Keke Barry DO  [facility] 6B, N627/1    Discharge Date Discharge Disposition Discharge Destination        4/15/2020 Home or Self Care              Attending Provider:  (none)   Allergies:  Penicillins, Percocet [Oxycodone-acetaminophen]    Isolation:  None   Infection:  None   Code Status:  Prior    Ht:  177.8 cm (70\")   Wt:  76.2 kg (168 lb)    Admission Cmt:  None   Principal Problem:  Atrial fibrillation with RVR  [I48.91]                 Active Insurance as of 2020     Primary Coverage     Payor Plan Insurance Group Employer/Plan Group    ANTHEM BLUE CROSS [facility] [employer] BLUE [facility] PPO [id number]     Payor Plan Address Payor Plan Phone Number Payor Plan Fax Number Effective Dates    [address] [phone]  2019 - None Entered    [place] [address]       Subscriber Name Subscriber Birth Date Member ID       SUMI ELMORE 1970 [id number]                 Emergency Contacts      (Rel.) Home Phone Work Phone Mobile Phone    AMKAYLA ELMORE (Spouse) [phone] -- [phone]               Discharge Summary      iVlma Jones MD at 04/15/20 [address] [place] [address] Medicine Services  SAME DAY ADMISSION & DISCHARGE    Patient Name: Sumi Elmore  : 1970  MRN: [id number]    Date of Admission and Discharge: 2020    Primary Care Physician: Rut Khanna MD  Consults     Date and Time Order Name Status Description    4/15/2020 0958 Inpatient Cardiology Consult Completed     3/17/2020 0030 Inpatient Cardiology Consult Completed       "       Subjective   Presentation and Brief Hospital Summary     Chief Complaint:  Atrial fibrillation with RVR (CMS/HCC) [I48.91]    Active Hospital Problems    Diagnosis  POA   • **Atrial fibrillation with RVR  [I48.91]  Yes   • Hypomagnesemia [E83.42]  Unknown   • Leukocytosis [D72.829]  Unknown   • Hypothyroidism [E03.9]  Yes   • Hypokalemia [E87.6]  Yes      Resolved Hospital Problems   No resolved problems to display.         HPI and Brief Hospital Course:  Sumi Garay is a 49 y.o. female with a medical history significant for hypothyroidism, basal cell carcinoma, uterine leiomyoma and atrial fibrillation on Eliquis (onset 3/16/20 at which time she was admitted to [facility] ICU and was on both cardizem then switched to amiodarone, she was seen by cardiology and d/c'd home on rhythmol and was to continue Eliquis for at least 30 days and repeat EKG with f/u to see Dr. Thompson in 8-12 weeks). Patient presents to the [facility] ED 4/14 for acute onset palpitations. Patient's works as a nursing director in [place]- for this reason patient was placed on 6A for covid rule out, in house testing sent 4/15 and returned as negative. Patient noted to have converted to NSR overnight. Have asked cardiology to formally see for assistance.  Ultimately patient was evaluated by cardiology and placed on medical therapy with rhytmol 325 BID, metoprolol and eliquis. She was instructed to take an extra dose of rhytmol for breakthrough episodes as needed and will f/u via video visit in 3 weeks. Patient was noted to be stable and in sinus rhythm at time of discharge.     • (-) negative COVID-19 test result  • The test is not perfect, so there is a chance it could be falsely negative or the virus level for too low for detection due to being very early in their infectious process.    • For this reason, Sumi Garay strongly encouraged to practice the safest standards in protecting their health and others given the endemic  concerns.  Patient was advised to:  o Practice social distancing in the community  o Continue to wash their hands frequently with soap and hot water, and cover their cough.   • If patient develops non-emergent symptoms such as mild increased shortness of breath, fever, cough, or for other questions, Sumi Garay was asked to please call their primary care physician’s office or the [place] hotline at [phone].  • Questions were engaged and answered to the best of my ability, patient expressed verbal understanding of their test results and my advice.         Review of Systems   See H&P  All other systems reviewed and are negative.    Personal History     Past Medical History:   Diagnosis Date   • Basal cell carcinoma 3/16/2020   • Uterine leiomyoma 3/16/2020       No past surgical history on file.    Family History: family history is not on file.     Social History:  reports that she has never smoked. She has never used smokeless tobacco.    Allergies:    Allergies   Allergen Reactions   • Penicillins Hives   • Percocet [Oxycodone-Acetaminophen] Rash       Objective   Objective Findings     Vital Signs:   Temp:  [97.3 °F (36.3 °C)-98.4 °F (36.9 °C)] 97.9 °F (36.6 °C)  Heart Rate:  [] 73  Resp:  [16-20] 18  BP: ()/(46-96) 101/62        Physical Exam (if not performed and documented at time of presentation on same date):   See h&P    Results Reviewed:  I have personally reviewed current lab, radiology, and data and agree.    Results from last 7 days   Lab Units 04/15/20  0112   WBC 10*3/mm3 15.59*   HEMOGLOBIN g/dL 12.7   HEMATOCRIT % 39.7   PLATELETS 10*3/mm3 319     Results from last 7 days   Lab Units 04/15/20  0635 04/15/20  0112 04/14/20  2033   SODIUM mmol/L  --  142 136   POTASSIUM mmol/L 4.4 4.5 3.2*   CHLORIDE mmol/L  --  108* 97*   CO2 mmol/L  --  23.0 24.0   BUN mg/dL  --  12 11   CREATININE mg/dL  --  0.65 0.90   GLUCOSE mg/dL  --  164* 256*   CALCIUM mg/dL  --  9.2 9.2   ALT  (SGPT) U/L  --   --  15   AST (SGOT) U/L  --   --  13   PROBNP pg/mL  --   --  151.4     No results found for: PHART, PCO2  Imaging Results (Last 24 Hours)     Procedure Component Value Units Date/Time    XR Chest 1 View [[id number]] Collected:  04/14/20 2156     Updated:  04/14/20 2158    Narrative:       CHEST X-RAY, 4/14/2020      HISTORY:    49-year-old female in the ED complaining of new onset chest pain. History of atrial fibrillation.      TECHNIQUE:  AP portable chest x-ray.    COMPARISON:  *  Chest x-ray, 3/16/2020.    FINDINGS:  Heart size and pulmonary vascularity are within normal limits. The lungs appear clear. No visible pulmonary edema, focal infiltrate or pleural effusion. No change since the prior exam.      Impression:       Negative chest. No change since 3/16/2020.    Signer Name: Nolberto Lawson MD   Signed: 4/14/2020 9:56 PM   Workstation Name: [facility]    [facility]        Results for orders placed during the hospital encounter of 03/16/20   Adult Transthoracic Echo Complete W/ Cont if Necessary Per Protocol    Narrative · Estimated EF = 60%.  · Left ventricular systolic function is normal.  · Saline test results are negative.            Discharge Details        Discharge Medications      New Medications      Instructions Start Date   metoprolol tartrate 25 MG tablet  Commonly known as:  LOPRESSOR   12.5 mg, Oral, 2 Times Daily      propafenone  MG 12 hr capsule  Commonly known as:  Rythmol SR   325 mg, Oral, 2 Times Daily         Continue These Medications      Instructions Start Date   apixaban 5 MG tablet tablet  Commonly known as:  ELIQUIS   5 mg, Oral, Every 12 Hours Scheduled      Biotin 5000 5 MG capsule  Generic drug:  Biotin   Oral      MULTIVITAMIN ADULT PO   Oral               Discharge Disposition:  Home or Self Care    Discharge Diet:    regular  Discharge Activity:  as tolerated  • (-) negative COVID-19 test result  • The test is not perfect,  so there is a chance it could be falsely negative or the virus level for too low for detection due to being very early in their infectious process.    • For this reason, Sumi Garay strongly encouraged to practice the safest standards in protecting their health and others given the endemic concerns.  Patient was advised to:  o Practice social distancing in the community  o Continue to wash their hands frequently with soap and hot water, and cover their cough.   • If patient develops non-emergent symptoms such as mild increased shortness of breath, fever, cough, or for other questions, Sumi Garay was asked to please call their primary care physician’s office or the [place] hotline at [phone].  • Questions were engaged and answered to the best of my ability, patient expressed verbal understanding of their test results and my advice.      CODE STATUS:   Code Status and Medical Interventions:   Ordered at: 04/15/20 0059     Code Status:    CPR     Medical Interventions (Level of Support Prior to Arrest):    Full         Future Appointments   Date Time Provider Department Center   5/27/2020  1:00 PM Urban Thompson MD [facility] None       Additional Instructions for the Follow-ups that You Need to Schedule     Discharge Follow-up with PCP   As directed       Currently Documented PCP:    Rut Khanna MD    PCP Phone Number:    [phone]     Follow Up Details:  1- 2 weeks         Discharge Follow-up with Specified Provider: Dr. Gonzalez- video visit; 3 Weeks   As directed      To:  Dr. Gonzalez- video visit    Follow Up:  3 Weeks               Time Spent on Discharge:  35 minutes    Electronically signed by Vilma Jones MD, 04/15/20, 5:46 PM.      Electronically signed by Vilma Jones MD at 04/15/20 [id number]

## 2020-05-13 ENCOUNTER — TELEMEDICINE (OUTPATIENT)
Dept: CARDIOLOGY | Facility: CLINIC | Age: 50
End: 2020-05-13

## 2020-05-13 VITALS
HEIGHT: 70 IN | HEART RATE: 57 BPM | DIASTOLIC BLOOD PRESSURE: 57 MMHG | SYSTOLIC BLOOD PRESSURE: 103 MMHG | BODY MASS INDEX: 22.19 KG/M2 | WEIGHT: 155 LBS

## 2020-05-13 DIAGNOSIS — I48.0 PAROXYSMAL ATRIAL FIBRILLATION (HCC): Primary | ICD-10-CM

## 2020-05-13 PROCEDURE — 99215 OFFICE O/P EST HI 40 MIN: CPT | Performed by: INTERNAL MEDICINE

## 2020-05-13 RX ORDER — FLUVOXAMINE MALEATE 50 MG/1
200 TABLET, COATED ORAL NIGHTLY
COMMUNITY
Start: 2020-05-07

## 2020-05-13 RX ORDER — DOXEPIN HYDROCHLORIDE 10 MG/1
10 CAPSULE ORAL DAILY PRN
COMMUNITY
Start: 2020-05-07

## 2020-05-13 NOTE — PROGRESS NOTES
.                     Cardiac Electrophysiology Outpatient Follow Up Note            [facility]     Follow Up Office Visit      Sumi Garay  [id number]  05/13/2020  [unfilled]  [unfilled]    Primary Care Physician: Rut Khanna MD    Referred By: No ref. provider found    Subjective     Chief Complaint:   Chief Complaint   Patient presents with   • Atrial Fibrillation     Atrial fibrillation with RVR     • Hypothyroidism   • Hypokalemia   • Uterine leiomyoma   • Basal cell carcinoma   • Paroxysmal and very rapidly and aberrantly conducted atrial fibrillation / structurally normal heart   • Hypomagnesemia   • Leukocytosis       Please note that this is a tele-health video teleconference clinic.  The patient consented to have a tele-health visit due to the global viral pandemic.  The patient understands that the risk-benefit profile of coming into the office physically is quite unfavorable at this point, that patient safety concerns are foremost and that this tele-health visit is consistent with recommendations from the Centers for Disease Control ( CDC ).  We spent a total of 24 minutes and 5 seconds in tele-clinic with the patient today.    History of Present Illness:   Mrs. Sumi Garay is a 49 y.o. female who presents to my electrophysiology clinic for follow up of her paroxysmal atrial fibrillation.  She is a patient who is suffered from A. fib now for several years with several admissions to the hospital.  I met her during the most recent admission to this hospital when she particularly bad spell.  We transitioned her to 325 mg of Copaxone SR twice daily and and discharged her home.  Prior to this admission she had been taking 225 mg of Repatha known twice daily.    I last saw her she is doing very well.  She has had no further episodes of A. fib.  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  She is tolerating  apixaban anticoagulation well.    In the interval since of last seen her she is also had been started on antianxiety medicine which she does helped her tremendously.  She feels that the stress of the COVID environment is led to her anxiety.  Prior to her feeling anxious however she still had very rapid episodes of A. fib.    She confirms to me that the episodes of A. fib make her feel short of breath profoundly tired and fatigued.  She will almost always end up in the ER because the episodes are so fast palpitations so severe.  She feels like she may pass out and has severe presyncope associated with atrial fibrillation episodes.    These note that she did have a COVID negative test during her inpatient stay on April 15.    Review of Systems:   Constitutional: No fevers or chills, no recent weight gain or weight loss or fatigue  Eyes: No visual loss, blurred vision, double vision, yellow sclerae.  ENT: No headaches, hearing loss, vertigo, congestion or sore throat.   Cardiovascular: Per HPI  Respiratory: No cough or wheezing, no sputum production, no hematemesis   Gastrointestinal: No abdominal pain, no nausea, vomiting, constipation, diarrhea, melena.   Genitourinary: No dysuria, hematuria or increased frequency.  Musculoskeletal:  No gait disturbance, weakness or joint pain or stiffness  Integumentary: No rashes, urticaria, ulcers or sores.   Neurological: No headache, dizziness, syncope, paralysis, ataxia, no prior CVA/TIA  Psychiatric: No anxiety, or depression  Endocrine: No diaphoresis, cold or heat intolerance. No polyuria or polydipsia.   Hematologic/Lymphatic: No anemia, abnormal bruising or bleeding. No history of DVT/PE.      Past Medical History:   Past Medical History:   Diagnosis Date   • Basal cell carcinoma 3/16/2020   • Uterine leiomyoma 3/16/2020       Past Surgical History: History reviewed. No pertinent surgical history.    Family History: History reviewed. No pertinent family history.    Social  "History:   Social History     Socioeconomic History   • Marital status:      Spouse name: Not on file   • Number of children: Not on file   • Years of education: Not on file   • Highest education level: Not on file   Tobacco Use   • Smoking status: Never Smoker   • Smokeless tobacco: Never Used   Substance and Sexual Activity   • Alcohol use: Not Currently   • Drug use: Never   • Sexual activity: Defer       Medications:     Current Outpatient Medications:   •  apixaban (ELIQUIS) 5 MG tablet tablet, Take 1 tablet by mouth Every 12 (Twelve) Hours. Indications: Atrial Fibrillation, Disp: 60 tablet, Rfl: 5  •  metoprolol tartrate (LOPRESSOR) 25 MG tablet, Take 0.5 tablets by mouth 2 (Two) Times a Day., Disp: 60 tablet, Rfl: 11  •  Multiple Vitamins-Minerals (MULTIVITAMIN ADULT PO), Take  by mouth Daily., Disp: , Rfl:   •  propafenone SR (Rythmol SR) 325 MG 12 hr capsule, Take 1 capsule by mouth 2 (Two) Times a Day., Disp: 60 capsule, Rfl: 3  •  doxepin (SINEquan) 10 MG capsule, 10 mg Daily., Disp: , Rfl:   •  fluvoxaMINE (LUVOX) 50 MG tablet, 50 mg Every Night., Disp: , Rfl:      Allergies:   Allergies   Allergen Reactions   • Penicillins Hives   • Percocet [Oxycodone-Acetaminophen] Rash       Objective     Physical Exam:  Vital Signs:   Vitals:    05/13/20 0843   BP: 103/57   BP Location: Right arm   Patient Position: Sitting   Pulse: 57   Weight: 70.3 kg (155 lb)   Height: 177.8 cm (70\")     GEN: Well nourished, well-developed, no acute distress  HEENT: Normocephalic, atraumatic, no perioral cyanosis.  Hearing is intact.  NECK: no JVD, no appreciable thyromegaly visual inspection.  CHEST: Normal respiratory effort, normal chest excursion.  EXTREMITIES: No gross deformities  SKIN: no overt visible lesions, no cyanosis noted.  NEURO: No focal deficits, alert and oriented x 3  PSYCHIATRIC: Normal affect and mood, appropriate use of semantics and logic.      Lab Results   Component Value Date    GLUCOSE 164 (H) " 04/15/2020    CALCIUM 9.2 04/15/2020     04/15/2020    K 4.4 04/15/2020    CO2 23.0 04/15/2020     (H) 04/15/2020    BUN 12 04/15/2020    CREATININE 0.65 04/15/2020    EGFRIFNONA 97 04/15/2020    BCR 18.5 04/15/2020    ANIONGAP 11.0 04/15/2020     Lab Results   Component Value Date    WBC 15.59 (H) 04/15/2020    HGB 12.7 04/15/2020    HCT 39.7 04/15/2020    MCV 85.6 04/15/2020     04/15/2020     No results found for: INR, PROTIME  Lab Results   Component Value Date    TSH 1.710 04/15/2020       Cardiac Testing:     I personally viewed and interpreted the patient's EKG/Telemetry/lab data    Procedures    Tobacco Cessation: N/A  Obstructive Sleep Apnea Screening: N/A    Assessment & Plan      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  She has failed antiarrhythmic therapy with Rythmol SR.  She is highly motivated undergo catheter ablation.  During our visit in the hospital and she and I had a lengthy conversation about ablation.  I revisited this during our check visit today via telemedicine conference.  She and I reviewed that there is a approximately 1 to 2% chance procedure complication including cardiac perforation tamponade stroke myocardial infarction death catheter entrapment of mitral valve annulus damage to the esophagus phrenic nerve injury as well.  We discussed pulmonary vein ablation including antral circumferential lesion set.  We discussed getting a CT scan preoperatively.  She wished to proceed.  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  Because the episodes of A. fib are so profoundly severe she wishes to stay on per path known throughout the procedure.  This is reasonable.    We will schedule her for catheter ablation in the very near future.  She  understands the risk-benefit profile in the procedure and wishes to proceed.      There are no diagnoses linked to this encounter.      Follow Up:       Thank you for allowing me to participate in the care of your patient. Please to not hesitate to contact me with additional questions or concerns.        Thad Gonzalez DO, FACC, [facility]  Cardiac Electrophysiologist

## 2020-05-18 PROBLEM — I48.0 PAROXYSMAL ATRIAL FIBRILLATION (HCC): Status: ACTIVE | Noted: 2020-05-18

## 2020-05-26 ENCOUNTER — PREP FOR SURGERY (OUTPATIENT)
Dept: OTHER | Facility: HOSPITAL | Age: 50
End: 2020-05-26

## 2020-05-26 DIAGNOSIS — I48.0 PAROXYSMAL ATRIAL FIBRILLATION (HCC): Primary | ICD-10-CM

## 2020-05-26 RX ORDER — SODIUM CHLORIDE 9 MG/ML
1 INJECTION, SOLUTION INTRAVENOUS CONTINUOUS
Status: CANCELLED | OUTPATIENT
Start: 2020-05-26 | End: 2020-05-26

## 2020-05-26 RX ORDER — NITROGLYCERIN 0.4 MG/1
0.4 TABLET SUBLINGUAL
Status: CANCELLED | OUTPATIENT
Start: 2020-05-26

## 2020-05-26 RX ORDER — SODIUM CHLORIDE 0.9 % (FLUSH) 0.9 %
3 SYRINGE (ML) INJECTION EVERY 12 HOURS SCHEDULED
Status: CANCELLED | OUTPATIENT
Start: 2020-05-26

## 2020-05-26 RX ORDER — ONDANSETRON 2 MG/ML
4 INJECTION INTRAMUSCULAR; INTRAVENOUS EVERY 6 HOURS PRN
Status: CANCELLED | OUTPATIENT
Start: 2020-05-26

## 2020-05-26 RX ORDER — SODIUM CHLORIDE 0.9 % (FLUSH) 0.9 %
10 SYRINGE (ML) INJECTION AS NEEDED
Status: CANCELLED | OUTPATIENT
Start: 2020-05-26

## 2020-05-31 ENCOUNTER — HOSPITAL ENCOUNTER (OUTPATIENT)
Dept: CT IMAGING | Facility: HOSPITAL | Age: 50
Discharge: HOME | End: 2020-05-31

## 2020-05-31 ENCOUNTER — APPOINTMENT (OUTPATIENT)
Dept: PREADMISSION TESTING | Facility: HOSPITAL | Age: 50
End: 2020-05-31

## 2020-05-31 DIAGNOSIS — I48.0 PAROXYSMAL ATRIAL FIBRILLATION (HCC): ICD-10-CM

## 2020-05-31 LAB
ANION GAP SERPL CALCULATED.3IONS-SCNC: 10 MMOL/L (ref 5–15)
BUN BLD-MCNC: 13 MG/DL (ref 6–20)
BUN/CREAT SERPL: 12.1 (ref 7–25)
CALCIUM SPEC-SCNC: 9.2 MG/DL (ref 8.6–10.5)
CHLORIDE SERPL-SCNC: 104 MMOL/L (ref 98–107)
CO2 SERPL-SCNC: 28 MMOL/L (ref 22–29)
CREAT BLD-MCNC: 1.07 MG/DL (ref 0.57–1)
DEPRECATED RDW RBC AUTO: 43.7 FL (ref 37–54)
ERYTHROCYTE [DISTWIDTH] IN BLOOD BY AUTOMATED COUNT: 13.6 % (ref 12.3–15.4)
GFR SERPL CREATININE-BSD FRML MDRD: 55 ML/MIN/1.73
GLUCOSE BLD-MCNC: 90 MG/DL (ref 65–99)
HBA1C MFR BLD: 5.6 % (ref 4.8–5.6)
HCT VFR BLD AUTO: 40.6 % (ref 34–46.6)
HGB BLD-MCNC: 12.5 G/DL (ref 12–15.9)
MCH RBC QN AUTO: 27.1 PG (ref 26.6–33)
MCHC RBC AUTO-ENTMCNC: 30.8 G/DL (ref 31.5–35.7)
MCV RBC AUTO: 88.1 FL (ref 79–97)
PLATELET # BLD AUTO: 255 10*3/MM3 (ref 140–450)
PMV BLD AUTO: 10.7 FL (ref 6–12)
POTASSIUM BLD-SCNC: 5 MMOL/L (ref 3.5–5.2)
RBC # BLD AUTO: 4.61 10*6/MM3 (ref 3.77–5.28)
SODIUM BLD-SCNC: 142 MMOL/L (ref 136–145)
WBC NRBC COR # BLD: 5.22 10*3/MM3 (ref 3.4–10.8)

## 2020-05-31 PROCEDURE — C9803 HOPD COVID-19 SPEC COLLECT: HCPCS

## 2020-05-31 PROCEDURE — U0004 COV-19 TEST NON-CDC HGH THRU: HCPCS

## 2020-05-31 PROCEDURE — 83036 HEMOGLOBIN GLYCOSYLATED A1C: CPT | Performed by: NURSE PRACTITIONER

## 2020-05-31 PROCEDURE — 71275 CT ANGIOGRAPHY CHEST: CPT

## 2020-05-31 PROCEDURE — 80048 BASIC METABOLIC PNL TOTAL CA: CPT | Performed by: NURSE PRACTITIONER

## 2020-05-31 PROCEDURE — 0 IOPAMIDOL PER 1 ML: Performed by: INTERNAL MEDICINE

## 2020-05-31 PROCEDURE — U0002 COVID-19 LAB TEST NON-CDC: HCPCS

## 2020-05-31 PROCEDURE — 36415 COLL VENOUS BLD VENIPUNCTURE: CPT

## 2020-05-31 PROCEDURE — 85027 COMPLETE CBC AUTOMATED: CPT | Performed by: NURSE PRACTITIONER

## 2020-05-31 RX ADMIN — IOPAMIDOL 80 ML: 755 INJECTION, SOLUTION INTRAVENOUS at 12:08

## 2020-06-01 LAB
REF LAB TEST METHOD: NORMAL
SARS-COV-2 RNA RESP QL NAA+PROBE: NOT DETECTED

## 2020-06-02 ENCOUNTER — ANESTHESIA EVENT (OUTPATIENT)
Dept: CARDIOLOGY | Facility: HOSPITAL | Age: 50
End: 2020-06-02

## 2020-06-02 ENCOUNTER — HOSPITAL ENCOUNTER (OUTPATIENT)
Facility: HOSPITAL | Age: 50
Discharge: HOME | End: 2020-06-03
Attending: INTERNAL MEDICINE | Admitting: INTERNAL MEDICINE

## 2020-06-02 ENCOUNTER — ANESTHESIA (OUTPATIENT)
Dept: CARDIOLOGY | Facility: HOSPITAL | Age: 50
End: 2020-06-02

## 2020-06-02 DIAGNOSIS — I48.0 PAROXYSMAL ATRIAL FIBRILLATION (HCC): ICD-10-CM

## 2020-06-02 LAB — B-HCG UR QL: NEGATIVE

## 2020-06-02 PROCEDURE — 93622 COMP EP EVAL L VENTR PAC&REC: CPT | Performed by: INTERNAL MEDICINE

## 2020-06-02 PROCEDURE — C2630 CATH, EP, COOL-TIP: HCPCS | Performed by: INTERNAL MEDICINE

## 2020-06-02 PROCEDURE — 93613 INTRACARDIAC EPHYS 3D MAPG: CPT | Performed by: INTERNAL MEDICINE

## 2020-06-02 PROCEDURE — 81025 URINE PREGNANCY TEST: CPT | Performed by: INTERNAL MEDICINE

## 2020-06-02 PROCEDURE — 25010000002 FENTANYL CITRATE (PF) 100 MCG/2ML SOLUTION: Performed by: NURSE ANESTHETIST, CERTIFIED REGISTERED

## 2020-06-02 PROCEDURE — 25010000003 LIDOCAINE 1 % SOLUTION: Performed by: INTERNAL MEDICINE

## 2020-06-02 PROCEDURE — C1893 INTRO/SHEATH, FIXED,NON-PEEL: HCPCS | Performed by: INTERNAL MEDICINE

## 2020-06-02 PROCEDURE — C1766 INTRO/SHEATH,STRBLE,NON-PEEL: HCPCS | Performed by: INTERNAL MEDICINE

## 2020-06-02 PROCEDURE — 25010000002 KETOROLAC TROMETHAMINE PER 15 MG: Performed by: INTERNAL MEDICINE

## 2020-06-02 PROCEDURE — 93656 COMPRE EP EVAL ABLTJ ATR FIB: CPT | Performed by: INTERNAL MEDICINE

## 2020-06-02 PROCEDURE — 93655 ICAR CATH ABLTJ DSCRT ARRHYT: CPT

## 2020-06-02 PROCEDURE — 25010000002 HEPARIN (PORCINE) PER 1000 UNITS: Performed by: INTERNAL MEDICINE

## 2020-06-02 PROCEDURE — 25010000003 LIDOCAINE 1 % SOLUTION: Performed by: NURSE ANESTHETIST, CERTIFIED REGISTERED

## 2020-06-02 PROCEDURE — 25010000002 PROTAMINE SULFATE PER 10 MG: Performed by: INTERNAL MEDICINE

## 2020-06-02 PROCEDURE — 85347 COAGULATION TIME ACTIVATED: CPT

## 2020-06-02 PROCEDURE — 25010000002 ONDANSETRON PER 1 MG: Performed by: NURSE ANESTHETIST, CERTIFIED REGISTERED

## 2020-06-02 PROCEDURE — 93662 INTRACARDIAC ECG (ICE): CPT | Performed by: INTERNAL MEDICINE

## 2020-06-02 PROCEDURE — 93623 PRGRMD STIMJ&PACG IV RX NFS: CPT | Performed by: INTERNAL MEDICINE

## 2020-06-02 PROCEDURE — 93657 TX L/R ATRIAL FIB ADDL: CPT | Performed by: INTERNAL MEDICINE

## 2020-06-02 PROCEDURE — C1730 CATH, EP, 19 OR FEW ELECT: HCPCS | Performed by: INTERNAL MEDICINE

## 2020-06-02 PROCEDURE — C1769 GUIDE WIRE: HCPCS | Performed by: INTERNAL MEDICINE

## 2020-06-02 PROCEDURE — 25010000002 DEXAMETHASONE PER 1 MG: Performed by: NURSE ANESTHETIST, CERTIFIED REGISTERED

## 2020-06-02 PROCEDURE — C1894 INTRO/SHEATH, NON-LASER: HCPCS | Performed by: INTERNAL MEDICINE

## 2020-06-02 PROCEDURE — 25010000002 PROPOFOL 10 MG/ML EMULSION: Performed by: NURSE ANESTHETIST, CERTIFIED REGISTERED

## 2020-06-02 PROCEDURE — 25010000002 MIDAZOLAM PER 1 MG: Performed by: NURSE ANESTHETIST, CERTIFIED REGISTERED

## 2020-06-02 PROCEDURE — C1759 CATH, INTRA ECHOCARDIOGRAPHY: HCPCS | Performed by: INTERNAL MEDICINE

## 2020-06-02 RX ORDER — PROMETHAZINE HYDROCHLORIDE 25 MG/1
25 SUPPOSITORY RECTAL ONCE AS NEEDED
Status: DISCONTINUED | OUTPATIENT
Start: 2020-06-02 | End: 2020-06-02 | Stop reason: HOSPADM

## 2020-06-02 RX ORDER — PROTAMINE SULFATE 10 MG/ML
INJECTION, SOLUTION INTRAVENOUS AS NEEDED
Status: DISCONTINUED | OUTPATIENT
Start: 2020-06-02 | End: 2020-06-02 | Stop reason: HOSPADM

## 2020-06-02 RX ORDER — SODIUM CHLORIDE, SODIUM LACTATE, POTASSIUM CHLORIDE, CALCIUM CHLORIDE 600; 310; 30; 20 MG/100ML; MG/100ML; MG/100ML; MG/100ML
9 INJECTION, SOLUTION INTRAVENOUS CONTINUOUS
Status: DISCONTINUED | OUTPATIENT
Start: 2020-06-02 | End: 2020-06-03 | Stop reason: HOSPADM

## 2020-06-02 RX ORDER — FAMOTIDINE 20 MG/1
20 TABLET, FILM COATED ORAL ONCE
Status: COMPLETED | OUTPATIENT
Start: 2020-06-02 | End: 2020-06-02

## 2020-06-02 RX ORDER — LIDOCAINE HYDROCHLORIDE 10 MG/ML
0.5 INJECTION, SOLUTION EPIDURAL; INFILTRATION; INTRACAUDAL; PERINEURAL ONCE AS NEEDED
Status: DISCONTINUED | OUTPATIENT
Start: 2020-06-02 | End: 2020-06-03 | Stop reason: HOSPADM

## 2020-06-02 RX ORDER — DEXAMETHASONE SODIUM PHOSPHATE 4 MG/ML
INJECTION, SOLUTION INTRA-ARTICULAR; INTRALESIONAL; INTRAMUSCULAR; INTRAVENOUS; SOFT TISSUE AS NEEDED
Status: DISCONTINUED | OUTPATIENT
Start: 2020-06-02 | End: 2020-06-02 | Stop reason: SURG

## 2020-06-02 RX ORDER — SODIUM CHLORIDE 0.9 % (FLUSH) 0.9 %
10 SYRINGE (ML) INJECTION AS NEEDED
Status: DISCONTINUED | OUTPATIENT
Start: 2020-06-02 | End: 2020-06-02 | Stop reason: HOSPADM

## 2020-06-02 RX ORDER — LIDOCAINE HYDROCHLORIDE 10 MG/ML
INJECTION, SOLUTION INFILTRATION; PERINEURAL AS NEEDED
Status: DISCONTINUED | OUTPATIENT
Start: 2020-06-02 | End: 2020-06-02 | Stop reason: HOSPADM

## 2020-06-02 RX ORDER — BUPIVACAINE HYDROCHLORIDE 5 MG/ML
INJECTION, SOLUTION EPIDURAL; INTRACAUDAL AS NEEDED
Status: DISCONTINUED | OUTPATIENT
Start: 2020-06-02 | End: 2020-06-02 | Stop reason: HOSPADM

## 2020-06-02 RX ORDER — FENTANYL CITRATE 50 UG/ML
INJECTION, SOLUTION INTRAMUSCULAR; INTRAVENOUS AS NEEDED
Status: DISCONTINUED | OUTPATIENT
Start: 2020-06-02 | End: 2020-06-02 | Stop reason: SURG

## 2020-06-02 RX ORDER — SODIUM CHLORIDE 0.9 % (FLUSH) 0.9 %
10 SYRINGE (ML) INJECTION EVERY 12 HOURS SCHEDULED
Status: DISCONTINUED | OUTPATIENT
Start: 2020-06-02 | End: 2020-06-03 | Stop reason: HOSPADM

## 2020-06-02 RX ORDER — PROMETHAZINE HYDROCHLORIDE 25 MG/1
25 TABLET ORAL ONCE AS NEEDED
Status: DISCONTINUED | OUTPATIENT
Start: 2020-06-02 | End: 2020-06-02 | Stop reason: HOSPADM

## 2020-06-02 RX ORDER — ROCURONIUM BROMIDE 10 MG/ML
INJECTION, SOLUTION INTRAVENOUS AS NEEDED
Status: DISCONTINUED | OUTPATIENT
Start: 2020-06-02 | End: 2020-06-02 | Stop reason: SURG

## 2020-06-02 RX ORDER — EPHEDRINE SULFATE 50 MG/ML
INJECTION, SOLUTION INTRAVENOUS AS NEEDED
Status: DISCONTINUED | OUTPATIENT
Start: 2020-06-02 | End: 2020-06-02 | Stop reason: SURG

## 2020-06-02 RX ORDER — SODIUM CHLORIDE 9 MG/ML
INJECTION, SOLUTION INTRAVENOUS CONTINUOUS PRN
Status: DISCONTINUED | OUTPATIENT
Start: 2020-06-02 | End: 2020-06-02 | Stop reason: HOSPADM

## 2020-06-02 RX ORDER — FENTANYL CITRATE 50 UG/ML
50 INJECTION, SOLUTION INTRAMUSCULAR; INTRAVENOUS
Status: DISCONTINUED | OUTPATIENT
Start: 2020-06-02 | End: 2020-06-02 | Stop reason: HOSPADM

## 2020-06-02 RX ORDER — SODIUM CHLORIDE 9 MG/ML
1 INJECTION, SOLUTION INTRAVENOUS CONTINUOUS
Status: ACTIVE | OUTPATIENT
Start: 2020-06-02 | End: 2020-06-02

## 2020-06-02 RX ORDER — ONDANSETRON 2 MG/ML
INJECTION INTRAMUSCULAR; INTRAVENOUS AS NEEDED
Status: DISCONTINUED | OUTPATIENT
Start: 2020-06-02 | End: 2020-06-02 | Stop reason: SURG

## 2020-06-02 RX ORDER — BUPIVACAINE HCL/0.9 % NACL/PF 0.125 %
PLASTIC BAG, INJECTION (ML) EPIDURAL AS NEEDED
Status: DISCONTINUED | OUTPATIENT
Start: 2020-06-02 | End: 2020-06-02 | Stop reason: SURG

## 2020-06-02 RX ORDER — PROPOFOL 10 MG/ML
VIAL (ML) INTRAVENOUS AS NEEDED
Status: DISCONTINUED | OUTPATIENT
Start: 2020-06-02 | End: 2020-06-02 | Stop reason: SURG

## 2020-06-02 RX ORDER — KETOROLAC TROMETHAMINE 15 MG/ML
15 INJECTION, SOLUTION INTRAMUSCULAR; INTRAVENOUS EVERY 8 HOURS
Status: DISCONTINUED | OUTPATIENT
Start: 2020-06-02 | End: 2020-06-03 | Stop reason: HOSPADM

## 2020-06-02 RX ORDER — FAMOTIDINE 10 MG/ML
20 INJECTION, SOLUTION INTRAVENOUS ONCE
Status: DISCONTINUED | OUTPATIENT
Start: 2020-06-02 | End: 2020-06-03 | Stop reason: HOSPADM

## 2020-06-02 RX ORDER — ONDANSETRON 2 MG/ML
4 INJECTION INTRAMUSCULAR; INTRAVENOUS EVERY 6 HOURS PRN
Status: DISCONTINUED | OUTPATIENT
Start: 2020-06-02 | End: 2020-06-02 | Stop reason: HOSPADM

## 2020-06-02 RX ORDER — SODIUM CHLORIDE 0.9 % (FLUSH) 0.9 %
10 SYRINGE (ML) INJECTION AS NEEDED
Status: DISCONTINUED | OUTPATIENT
Start: 2020-06-02 | End: 2020-06-03 | Stop reason: HOSPADM

## 2020-06-02 RX ORDER — LIDOCAINE HYDROCHLORIDE 10 MG/ML
INJECTION, SOLUTION INFILTRATION; PERINEURAL AS NEEDED
Status: DISCONTINUED | OUTPATIENT
Start: 2020-06-02 | End: 2020-06-02 | Stop reason: SURG

## 2020-06-02 RX ORDER — HEPARIN SODIUM 10000 [USP'U]/100ML
INJECTION, SOLUTION INTRAVENOUS CONTINUOUS PRN
Status: DISCONTINUED | OUTPATIENT
Start: 2020-06-02 | End: 2020-06-02 | Stop reason: HOSPADM

## 2020-06-02 RX ORDER — MIDAZOLAM HYDROCHLORIDE 1 MG/ML
INJECTION INTRAMUSCULAR; INTRAVENOUS AS NEEDED
Status: DISCONTINUED | OUTPATIENT
Start: 2020-06-02 | End: 2020-06-02 | Stop reason: SURG

## 2020-06-02 RX ORDER — PROMETHAZINE HYDROCHLORIDE 25 MG/ML
6.25 INJECTION, SOLUTION INTRAMUSCULAR; INTRAVENOUS ONCE AS NEEDED
Status: DISCONTINUED | OUTPATIENT
Start: 2020-06-02 | End: 2020-06-02 | Stop reason: HOSPADM

## 2020-06-02 RX ORDER — SODIUM CHLORIDE 9 MG/ML
INJECTION, SOLUTION INTRAVENOUS CONTINUOUS PRN
Status: DISCONTINUED | OUTPATIENT
Start: 2020-06-02 | End: 2020-06-02 | Stop reason: SURG

## 2020-06-02 RX ORDER — HEPARIN SODIUM 1000 [USP'U]/ML
INJECTION, SOLUTION INTRAVENOUS; SUBCUTANEOUS AS NEEDED
Status: DISCONTINUED | OUTPATIENT
Start: 2020-06-02 | End: 2020-06-02 | Stop reason: HOSPADM

## 2020-06-02 RX ORDER — SODIUM CHLORIDE 0.9 % (FLUSH) 0.9 %
3 SYRINGE (ML) INJECTION EVERY 12 HOURS SCHEDULED
Status: DISCONTINUED | OUTPATIENT
Start: 2020-06-02 | End: 2020-06-02 | Stop reason: HOSPADM

## 2020-06-02 RX ORDER — NITROGLYCERIN 0.4 MG/1
0.4 TABLET SUBLINGUAL
Status: DISCONTINUED | OUTPATIENT
Start: 2020-06-02 | End: 2020-06-02 | Stop reason: HOSPADM

## 2020-06-02 RX ADMIN — KETOROLAC TROMETHAMINE 15 MG: 15 INJECTION, SOLUTION INTRAMUSCULAR; INTRAVENOUS at 23:37

## 2020-06-02 RX ADMIN — Medication 80 MCG: at 10:32

## 2020-06-02 RX ADMIN — ROCURONIUM BROMIDE 10 MG: 10 SOLUTION INTRAVENOUS at 09:39

## 2020-06-02 RX ADMIN — KETOROLAC TROMETHAMINE 15 MG: 15 INJECTION, SOLUTION INTRAMUSCULAR; INTRAVENOUS at 16:17

## 2020-06-02 RX ADMIN — PROPOFOL 200 MG: 10 INJECTION, EMULSION INTRAVENOUS at 08:26

## 2020-06-02 RX ADMIN — LIDOCAINE HYDROCHLORIDE 50 MG: 10 INJECTION, SOLUTION INFILTRATION; PERINEURAL at 08:26

## 2020-06-02 RX ADMIN — MIDAZOLAM 2 MG: 1 INJECTION INTRAMUSCULAR; INTRAVENOUS at 08:26

## 2020-06-02 RX ADMIN — ROCURONIUM BROMIDE 10 MG: 10 SOLUTION INTRAVENOUS at 10:21

## 2020-06-02 RX ADMIN — SUGAMMADEX 300 MG: 100 INJECTION, SOLUTION INTRAVENOUS at 11:05

## 2020-06-02 RX ADMIN — Medication 10 ML: at 20:29

## 2020-06-02 RX ADMIN — FENTANYL CITRATE 50 MCG: 50 INJECTION, SOLUTION INTRAMUSCULAR; INTRAVENOUS at 12:17

## 2020-06-02 RX ADMIN — FAMOTIDINE 20 MG: 20 TABLET, FILM COATED ORAL at 06:49

## 2020-06-02 RX ADMIN — SODIUM CHLORIDE 1 ML/KG/HR: 9 INJECTION, SOLUTION INTRAVENOUS at 06:49

## 2020-06-02 RX ADMIN — ROCURONIUM BROMIDE 10 MG: 10 SOLUTION INTRAVENOUS at 08:53

## 2020-06-02 RX ADMIN — ROCURONIUM BROMIDE 10 MG: 10 SOLUTION INTRAVENOUS at 10:49

## 2020-06-02 RX ADMIN — Medication 80 MCG: at 10:31

## 2020-06-02 RX ADMIN — ROCURONIUM BROMIDE 40 MG: 10 SOLUTION INTRAVENOUS at 08:27

## 2020-06-02 RX ADMIN — SODIUM CHLORIDE: 9 INJECTION, SOLUTION INTRAVENOUS at 08:14

## 2020-06-02 RX ADMIN — FENTANYL CITRATE 100 MCG: 50 INJECTION, SOLUTION INTRAMUSCULAR; INTRAVENOUS at 08:26

## 2020-06-02 RX ADMIN — Medication 80 MCG: at 10:36

## 2020-06-02 RX ADMIN — ONDANSETRON 4 MG: 2 INJECTION INTRAMUSCULAR; INTRAVENOUS at 11:22

## 2020-06-02 RX ADMIN — DEXAMETHASONE SODIUM PHOSPHATE 8 MG: 4 INJECTION, SOLUTION INTRAMUSCULAR; INTRAVENOUS at 08:36

## 2020-06-02 RX ADMIN — EPHEDRINE SULFATE 10 MG: 50 INJECTION INTRAVENOUS at 08:59

## 2020-06-02 NOTE — ANESTHESIA PREPROCEDURE EVALUATION
Anesthesia Evaluation                  Airway   Mallampati: I  TM distance: >3 FB  Neck ROM: full  No difficulty expected  Dental      Pulmonary    Cardiovascular     ECG reviewed        Neuro/Psych  GI/Hepatic/Renal/Endo    (+)   thyroid problem hypothyroidism    Musculoskeletal     Abdominal    Substance History      OB/GYN          Other                        Anesthesia Plan    ASA 2     general   (Valorie)  intravenous induction     Anesthetic plan, all risks, benefits, and alternatives have been provided, discussed and informed consent has been obtained with: patient.    Plan discussed with CRNA.

## 2020-06-02 NOTE — ANESTHESIA POSTPROCEDURE EVALUATION
Patient: Sumi Garay    Procedure Summary     Date:  06/02/20 Room / Location:  [facility] CATH/EP LAB G / [facility] EP INVASIVE LOCATION    Anesthesia Start:  0815 Anesthesia Stop:      Procedure:  PVA (paroxysmal), no meds to hold, Precision (SJM), ASAP   (N/A ) Diagnosis:       Paroxysmal atrial fibrillation (CMS/HCC)      (afib)    Provider:  Thad Gonzalez DO Provider:  Yair Hooker MD    Anesthesia Type:  general ASA Status:  2          Anesthesia Type: general    Vitals  No vitals data found for the desired time range.  /57    SpO2 100%  Resp 12  Temp 99.0        Post Anesthesia Care and Evaluation    Patient location during evaluation: PACU  Patient participation: waiting for patient participation  Pain score: 0  Pain management: adequate  Airway patency: patent  Anesthetic complications: No anesthetic complications  PONV Status: none  Cardiovascular status: hemodynamically stable and acceptable  Respiratory status: nonlabored ventilation, acceptable, nasal cannula and oral airway  Hydration status: acceptable

## 2020-06-02 NOTE — OP NOTE
Cardiac Electrophysiology Procedure Note           [facility]          CATHETER ABLATION FOR ATRIAL FIBRILLATION (PVI)    PROCEDURES PERFORMED:    · Catheter ablation of paroxysmal atrial fibrillation  · Catheter ablation to isolate the superior vena cava ( an AF trigger for her )  · Full diagnostic EP study  · 3D electroanatomic mapping  · drug infusion / programmed pacing  · Intracadiac Echocardiography  · Double transeptal puncture  · Left ventricular pacing and recording    PREPROCEDURAL DIAGNOSES:    1. Paroxysmal Atrial fibrillation  2. [id number] score of 1 ( female sex )    POST PROCEDURE DIANGOSES:  As above.    INDICATION FOR PROCEDURE:  Briefly, Sumi Garay is a 49 y.o. year old female with a history of highly symptomatic drug refractory paroxysmal atrial fibrillation.  The patient has failed flecainide Rythmol SR.  She is a nurse with a structurally normal heart.    ANTICOAGULATION STRATEGY PRIOR TO AND POST PROCEDURE: Apixaban 5 mg twice daily.  The last dose of anticoagulant was confirmed to have been taken morning thus the procedure was performed with uninterrupted anticoagulation.      PT/INR:  No results found for: LABPROT, INR  PTT:  No results found for: APTT    CBC:   WBC   Date Value Ref Range Status   05/31/2020 5.22 3.40 - 10.80 10*3/mm3 Final     RBC   Date Value Ref Range Status   05/31/2020 4.61 3.77 - 5.28 10*6/mm3 Final     Hemoglobin   Date Value Ref Range Status   05/31/2020 12.5 12.0 - 15.9 g/dL Final     Hematocrit   Date Value Ref Range Status   05/31/2020 40.6 34.0 - 46.6 % Final     MCV   Date Value Ref Range Status   05/31/2020 88.1 79.0 - 97.0 fL Final     RDW   Date Value Ref Range Status   05/31/2020 13.6 12.3 - 15.4 % Final     Platelets   Date Value Ref Range Status   05/31/2020 255 140 - 450 10*3/mm3 Final     BMP:     Sodium   Date Value Ref Range Status   05/31/2020 142 136 - 145 mmol/L Final     Potassium   Date Value Ref  "Range Status   05/31/2020 5.0 3.5 - 5.2 mmol/L Final     Chloride   Date Value Ref Range Status   05/31/2020 104 98 - 107 mmol/L Final     CO2   Date Value Ref Range Status   05/31/2020 28.0 22.0 - 29.0 mmol/L Final     BUN   Date Value Ref Range Status   05/31/2020 13 6 - 20 mg/dL Final     Creatinine   Date Value Ref Range Status   05/31/2020 1.07 (H) 0.57 - 1.00 mg/dL Final       Vital Signs: /63   Pulse 99   Temp 97.4 °F (36.3 °C)   Resp 16   Ht 177.8 cm (70\")   Wt 79.5 kg (175 lb 4.3 oz)   SpO2 99%   BMI 25.15 kg/m²      Admit Weight:  79.5 kg (175 lb 4.3 oz)  BMI: Body mass index is 25.15 kg/m².    PROCEDURE NARRATIVE:    The patient was able to give written informed consent after revisiting the key portions of the risk versus benefit profile of the procedure.  This discussion was framed by our lengthy conversations  (please see our detailed notes).  Patient verbalized strong understanding of this discussion and a strong desire to proceed with the procedure.  Please note that this detailed informed consent process utilized mutual and shared decision making process between all parties involved, principally the physician and patient, but also potentially with input from the patient's selected family and friends.    The patient was brought to the EP laboratory in the post absorptive state.  The patient was electively intubated for the procedure and given a general anesthetic by colleagues from anesthesia.   An sia-gastric tube was placed by anesthesia staff.  A radial artery catheter was placed by anesthesia staff for continuous blood pressure monitoring.  Please see the detailed anesthesia records.    The patient was then prepared and draped in a routing sterile fashion.  Seldinger access was obtained at the right common femoral vein with three venipunctures.  J tip wires were advanced into the vascular space.  A short 10 Guatemalan sheath, an SL0 sheath and a deflectable sheath were placed into the " right common femoral vein and the inferior vena cava / right atrium in an over the wire fashion.    The patient was anticoagulated with intravenous heparin (initial bolus and then continuous infusion) with a goal ACT of between 350 and 400 seconds.  A phased array ICE catheter was placed into the right atrium and right ventricle.  This was used for transeptal puncture, monitoring of the pericardial space, monitoring of the ablation catheter position within the heart and monitoring of other cardiac structures such as the left atrial appendage (to document the absence of thrombus), pulmonary veins, esophagus, mitral valve annulus and other cardiac structures.    Double transeptal puncture was performed after heparin administration with a combination of echocardiographic and fluoroscopic guidance.  This was performed with the long sheaths, a BRK needle, and a SafeSept transeptal wire.  Catheters and sheaths were advanced safely into the left atrium.  A 20 Lasso medic enabled electrode Jose diagnostic electrophysiology catheter and 4mm irrigated tip ablation catheter were used for pacing and recording in the right atrium, superior vena cava, left atrium, left atrial appendage, pulmonary veins and left ventricle at various times throughout the procedure.  We used the precision 3D electroanatomic mapping system in conjunction with these catheters to construct an electroanatomic shell of the left atrium, left atrial appendage, mitral valve annulus, interatrial septum and pulmonary veins.  Left ventricular pacing and recording were performed by placing catheters safely and carefully across the mitral valve annulus to the left ventricle from the left atrium.  Adequate sensing and pacing thresholds were obtained from the left ventricle.  AV conduction ( antegrade ) as well as VA conduction ( retrograde ) were studied.  This was performed as a distinct addition to the diagnostic EP study.  Findings were conclusive for no  accessory pathway.     An EP study was performed.  Data obtained from this is listed in the below table:    Initial Study    Isuprel Washout Study           drive train / burst extra    QRS 80    Rhythm          Atrial CL      R-R 920    Ventricular CL      AH 56    AVBCL      HV 39    AVNERP       drive train / burst extrastim   Slow Pway ERP      Rhythm     Fast Pway ERP      Atrial CL     VABCL conc? /  Dec?      Ventricular CL     VAERP      AVBCL 270    AERP      AVNERP 600 230   VERP      Slow Pway ERP     AP antegrade ERP      Fast Pway ERP     AP retrograde ERP      VABCL conc? /  Dec? 290          VAERP 600 230  Final Study      AERP      drive train / burst extrastim    VERP     Rhythm      AP antegrade ERP     Atrial CL      AP retrograde ERP     Ventricular CL           AVBCL     Isuprel Dose =      AVNERP       drive train / burst extrastim   Slow Pway ERP      Rhythm     Fast Pway ERP      Atrial CL     VABCL conc? /  Dec?      Ventricular CL     VAERP      AVBCL     AERP      AVNERP     VERP      Slow Pway ERP     AP antegrade ERP      Fast Pway ERP     AP retrograde ERP      VABCL conc? /  Dec?           VAERP           AERP           VERP           AP antegrade ERP           AP retrograde ERP                         Catheter ablation was performed to achieve pulmonary vein isolation.  A wide antral circumferential ablation approach was used.  Power was limited to 40 W on the posterior left atrium and 40 W elsewhere.  Lesions were made using proprietary contact force technology such that no lesion points were placed on the cardiac 3D electroanatomic map unless there was stable catheter position.   Lesions were placed 1-3 cm away from the ostia of the pulmonary veins and never in proximity to the esophagus.  Additional lesions were given within the antral lesion set at the farnaz between superior and inferior veins to achieve total isolation, when and where necessary.  During isolation of  the left superior pulmonary vein the patient had several salvos of rapid pulmonary vein tachycardia which triggered atrial fibrillation.  With isolation of the left superior pulmonary vein atrial fibrillation terminated.    At various points we manipulated the esophagus away from the posterior antrum where we were ablating using an Esosure esophageal manipulation device.  No lesions were given anywhere near the esophagus at any point.  Please also note that we visualized the full with of the esophagus with barium administered via the orogastric tube.   Patient tolerated this manipulation extremely well.    Isuprel was administered intravenously following ablation to test for other atrial and or ventricular arrhythmias.  Isoproterenol was infused up to 25 mcg/min.  The patient achieved sinus tachycardia 145 bpm.  Isoproterenol was discontinued and we observe the patient during washout as well.     Extensive atrial burst pacing and programmed stimulation was performed in an attempt to induce other and additional arrhythmias.  We were able to induce atrial fibrillation in this manner.  A very rapid atrial tachycardia received induction of atrial fibrillation.  Activation morphology of the P wave was analyzed and this was consistent with a high right atrial origin.  I placed the circumferential mapping catheter into the superior vena cava and found clear evidence of a rapid superior vena cava tachycardia.   At this time we targeted isolation of the superior vena cava.  Paralysis was reversed and we confirmed this with our CRNA colleague.  Careful mapping of the superior vena cava performed with a 3D mapping system.  Radiofrequency ablation was performed here at a maximum of 30 W power and we were able to isolate the superior vena cava from the right atrium.  Sinus rhythm ensued.    After completing the antral ablation lesion set, I re-interrogated the pulmonary veins using and documented pulmonary vein isolation once  "again.    The ICE catheter revealed that there was no pericardial effusion.    Catheters and sheaths were then removed from the body.  Hemostasis was achieved with manual pressure after reversal of anticoagulation with protamine.  A hemostatic \"figure of eight\" suture was applied at the groin.  This suture is to be removed prior to the patient being discharged home.  The patient was extubated in routine fashion and transferred to PACU in stable condition.    COMPLICATIONS: None    EBL: minimal    RADIATION EXPOSURE: 109 mGy over 13.1 minutes    LA PRESSURE: 15 / 9 mmHg    RADIOFREQUENCY APPLICATION TIME (RF TIME): 36 minutes            KEY PROCEDURAL FINDINGS:  · Normal AV node and His-Purkinje system function  · Absence of scar in the left atrium by voltage mapping.  · Successful antral pulmonary vein isolation  · Successful isolation of the superior vena cava which was a clear trigger for atrial fibrillation    POST PROCEDURAL PLAN:    ·  Report was called the the PACU nurse responsible for the patients care.  · Uninterrupted anticoagulation for not less than 90 days unless specially instructed otherwise by myself or another member of our EP physician team.  Please note that the patient and the patient’s family have been extensively counseled about this critical requirement and have agreed to comply.  · Esophageal prophylaxis with proton pump inhibitor for 90 days.  · Anticipate discharge tomorrow.  · Medications were reconciled, and key changes in medications include: Discontinue the patient's antiarrhythmic drug.  She has been taking Rythmol.  · Patient and family instructed to call immediately for fever greater than 101.5 degrees F, hematemesis, increasing or severe chest pain, increasing shortness of breath, bleeding or other concerns.  Patient and family instructed that some chest discomfort is normal and is to be expected and that this should be expected to decrease over the first 3-4 days after the ablation " procedure.  · The patient will be seen at our office per routine follow up.      Thad Gonzalez DO, FACC, [facility]  Cardiac Electrophysiologist  [facility]

## 2020-06-02 NOTE — ANESTHESIA PROCEDURE NOTES
Airway  Urgency: elective    Date/Time: 6/2/2020 8:29 AM  Airway not difficult    General Information and Staff    Patient location during procedure: OR  CRNA: Terra Subramanian CRNA    Indications and Patient Condition  Indications for airway management: airway protection    Preoxygenated: yes  MILS not maintained throughout  Mask difficulty assessment: 1 - vent by mask    Final Airway Details  Final airway type: endotracheal airway      Successful airway: ETT  Cuffed: yes   Successful intubation technique: direct laryngoscopy  Endotracheal tube insertion site: oral  Blade: Eliazar  Blade size: 3  ETT size (mm): 7.0  Cormack-Lehane Classification: grade I - full view of glottis  Placement verified by: chest auscultation and capnometry   Measured from: lips  ETT/EBT  to lips (cm): 19  Number of attempts at approach: 1  Assessment: lips, teeth, and gum same as pre-op and atraumatic intubation    Additional Comments  Negative epigastric sounds, Breath sound equal bilaterally with symmetric chest rise and fall

## 2020-06-02 NOTE — INTERVAL H&P NOTE
History of present illness:  Mrs Sumi Garay is a 49-year-old white female who presents today for elective pulmonary vein ablation and treatment for her recurrent symptomatic paroxysmal atrial fibrillation with rapid ventricular rates.  She was initially identified to have atrial fibrillation in March 2020 with a noted past history of intermittent palpitations over the past few years.  She was hospitalized and initially failed ECV with conversion on IV amiodarone and transition to Rythmol therapy.  Minimally elevated TSH noted this admission with normal T3/T4.  She is noted to have recurrence and April 2020.  Both episodes were highly symptomatic with tachypalpitations, dizziness, and presyncope requiring hospitalization.  She was recently seen in EP consultation on 5/13/2020 with Dr. Gonzalez and options for continued treatment with medical therapy versus an ablation procedure outlined and reviewed in detail.  Ultimately decision was made to proceed PVA in treatment for her arrhythmia.  Upon presentation today she denies recurrence of palpitations since her last episode in April.  She has a noted CHADsVASc = 1 for gender only and is currently taking Eliquis therapy in setting of ablation procedure.  She denies side effects to the Eliquis or signs/symptoms of bleeding.  She reports holding her Rythmol over the past 2 days as recommended prior to her PVA.  All risks, benefits, and alternatives to the procedure were outlined and reviewed in consultation again here today.  She verbalizes complete understanding of the procedure and is willing to proceed as scheduled today.  She denies recent infections or signs of cough, fever, sweats, or chills.  All preop lab values have been reviewed and negative COVID screening noted 5/31/2020.    Constitutional: No fevers or chills, no recent weight gain or weight loss or fatigue  Eyes: No visual loss, blurred vision, double vision, yellow sclerae.  ENT: No headaches,  "hearing loss, vertigo, congestion or sore throat.   Cardiovascular: Per HPI  Respiratory: No cough or wheezing, no sputum production, no hematemesis   Gastrointestinal: No abdominal pain, no nausea, vomiting, constipation, diarrhea, melena.   Genitourinary: No dysuria, hematuria or increased frequency.  Musculoskeletal:  No gait disturbance, weakness or joint pain or stiffness  Integumentary: No rashes, urticaria, ulcers or sores.   Neurological: No headache, dizziness, syncope, paralysis, ataxia, no prior CVA/TIA  Psychiatric: No anxiety, or depression  Endocrine: No diaphoresis, cold or heat intolerance. No polyuria or polydipsia.   Hematologic/Lymphatic: No anemia, abnormal bruising or bleeding. No history of DVT/PE.    Lab Results   Component Value Date    WBC 5.22 05/31/2020    HGB 12.5 05/31/2020    HCT 40.6 05/31/2020    MCV 88.1 05/31/2020     05/31/2020     Lab Results   Component Value Date    GLUCOSE 90 05/31/2020    CALCIUM 9.2 05/31/2020     05/31/2020    K 5.0 05/31/2020    CO2 28.0 05/31/2020     05/31/2020    BUN 13 05/31/2020    CREATININE 1.07 (H) 05/31/2020    EGFRIFNONA 55 (L) 05/31/2020    BCR 12.1 05/31/2020    ANIONGAP 10.0 05/31/2020     /68 (BP Location: Left arm, Patient Position: Lying)   Pulse 70   Temp 97.9 °F (36.6 °C) (Temporal)   Resp 14   Ht 177.8 cm (70\")   Wt 79.5 kg (175 lb 4.3 oz)   SpO2 100%   BMI 25.15 kg/m²     Telemetry:  NSR 75 bpm    GEN: Well nourished, well-developed, no acute distress  HEENT: Normocephalic, atraumatic, moist mucous membranes  NECK: Supple, no JVD, no thyromegaly, no lymphadenopathy  CARD: Regular rate and rhythm, normal S1 & S2 are present.  No murmur, gallop or rubs are appreciated.  LUNGS: Clear to auscultation bilateraly, normal respiratory effort  ABDOMEN: Soft, nontender, normal bowel sounds  EXTREMITIES: No gross deformities, no clubbing, cyanosis.  No Edema  SKIN: Warm, dry  NEURO: No focal deficits, alert and " oriented x 3  PSYCHIATRIC: Normal affect and mood, appropriate use of semantics and logic.    Electronically signed by QUIN Scruggs, 06/02/20, 7:46 AM.

## 2020-06-02 NOTE — ANESTHESIA PROCEDURE NOTES
Arterial Line      Patient reassessed immediately prior to procedure    Patient location during procedure: OR   Line placed for hemodynamic monitoring and MD/Surgeon request.  Performed By   STEVE: Terra Subramanian CRNA  Preanesthetic Checklist  Completed: patient identified, site marked, surgical consent, pre-op evaluation, timeout performed, IV checked, risks and benefits discussed and monitors and equipment checked  Arterial Line Prep   Sterile Tech: cap, gloves and sterile barriers  Prep: ChloraPrep  Patient monitoring: blood pressure monitoring, continuous pulse oximetry and EKG  Arterial Line Procedure   Laterality:left  Location:  radial artery  Catheter size: 20 G   Guidance: palpation technique  Number of attempts: 1  Successful placement: yes  Post Assessment   Dressing Type: occlusive dressing applied, secured with tape and wrist guard applied.   Complications no  Circ/Move/Sens Assessment: normal and unchanged.   Patient Tolerance: patient tolerated the procedure well with no apparent complications

## 2020-06-03 VITALS
RESPIRATION RATE: 16 BRPM | TEMPERATURE: 97.8 F | OXYGEN SATURATION: 98 % | DIASTOLIC BLOOD PRESSURE: 59 MMHG | HEIGHT: 70 IN | WEIGHT: 175.27 LBS | SYSTOLIC BLOOD PRESSURE: 107 MMHG | BODY MASS INDEX: 25.09 KG/M2 | HEART RATE: 85 BPM

## 2020-06-03 PROCEDURE — 25010000002 KETOROLAC TROMETHAMINE PER 15 MG: Performed by: INTERNAL MEDICINE

## 2020-06-03 PROCEDURE — 99024 POSTOP FOLLOW-UP VISIT: CPT | Performed by: NURSE PRACTITIONER

## 2020-06-03 PROCEDURE — 93010 ELECTROCARDIOGRAM REPORT: CPT | Performed by: INTERNAL MEDICINE

## 2020-06-03 PROCEDURE — 93005 ELECTROCARDIOGRAM TRACING: CPT | Performed by: INTERNAL MEDICINE

## 2020-06-03 RX ORDER — PANTOPRAZOLE SODIUM 40 MG/1
40 TABLET, DELAYED RELEASE ORAL DAILY
Qty: 30 TABLET | Refills: 3 | Status: SHIPPED | OUTPATIENT
Start: 2020-06-03 | End: 2020-09-14

## 2020-06-03 RX ADMIN — KETOROLAC TROMETHAMINE 15 MG: 15 INJECTION, SOLUTION INTRAMUSCULAR; INTRAVENOUS at 10:00

## 2020-06-03 NOTE — PROGRESS NOTES
"      Cardiac Electrophysiology Inpatient Follow Up Note         [facility]     Progress Note      CC:  Paroxysmal Atrial Fibrillation    Subjective     Mrs. Garay is seen in follow-up of her pulmonary vein ablation yesterday.  She is sitting up at bedside with minimal complaints today.  She reports having one brief episode of A. fib throughout the night lasting only a few seconds.  Otherwise she remains in normal sinus rhythm 80-90 bpm.  She has been up walking in room and voiding without difficulty.  Her right groin sutures were removed without hematoma or drainage noted.  She states she is ready for discharge home as scheduled.    REVIEW OF SYSTEMS  ROS no change from admission H&P     Objective   VITAL SIGNS  /59 (BP Location: Right arm, Patient Position: Lying)   Pulse 86   Temp 97.8 °F (36.6 °C) (Oral)   Resp 16   Ht 177.8 cm (70\")   Wt 79.5 kg (175 lb 4.3 oz)   LMP 2019 (Within Days)   SpO2 98%   Breastfeeding No   BMI 25.15 kg/m²     Pulse Ox: SpO2  Av.1 %  Min: 90 %  Max: 100 %  Supplemental O2:       Admit Weight  Weight: 79.5 kg (175 lb 4.3 oz)  Last 3 Weights    Body mass index is 25.15 kg/m².    INTAKE/OUTPUT  I/O last 3 completed shifts:  In: 1150 [I.V.:1150]  Out: -     Intake/Output Summary (Last 24 hours) at 6/3/2020 0855  Last data filed at 2020 1122  Gross per 24 hour   Intake 1150 ml   Output --   Net 1150 ml       PHYSICAL EXAM  General appearance: awake, alert, oriented, moves all extremities  Lungs: no rhonchi, no wheezes, no rales  Heart: S1S2 RRR  Abdomen: positive bowel sounds, no bruits, no masses  Extremities: warm and dry, no cyanosis, no clubbing    LABS  Results from last 7 days   Lab Units 20  1024   WBC 10*3/mm3 5.22   HEMOGLOBIN g/dL 12.5   HEMATOCRIT % 40.6   MCV fL 88.1   PLATELETS 10*3/mm3 255         Results from last 7 days   Lab Units 20  1024   POTASSIUM mmol/L 5.0   CHLORIDE mmol/L 104   CO2 mmol/L " 28.0   BUN mg/dL 13   CREATININE mg/dL 1.07*   GLUCOSE mg/dL 90   CALCIUM mg/dL 9.2       CURRENT MEDICATIONS    famotidine 20 mg Intravenous Once   ketorolac 15 mg Intravenous Q8H   sodium chloride 10 mL Intravenous Q12H           TELEMETRY: NSR 80 bpm    Assessment & Plan     Ms. Garay is seen in follow up of her pulmonary vein ablation performed yesterday.  She is stable and done well overnight and is ready for discharge home today with follow-up in 1 month with A. fib clinic and in 3 months with Dr. Oliva.  She is instructed on all activity restrictions and wound care instructions.  She is educated on all medication changes outlined on discharge paperwork.  She is educated on potential for recurrent A. fib within the blanking period following ablation procedure.  Education reinforced on importance of uninterrupted therapy of anticoagulation over the next 3 months following procedure.  She verbalizes complete understanding of the above instructions and is ready for discharge home today.        Electronically signed by QUIN Scruggs, 06/03/20, 8:55 AM.

## 2020-06-03 NOTE — PLAN OF CARE
"  Problem: Patient Care Overview  Goal: Plan of Care Review  Outcome: Ongoing (interventions implemented as appropriate)  Note:   Patient remains in NSR.  Patient states she has had a couple of episodes of a \"fluttering\" feeling but otherwise feels great.  Right groin site and purse string stitch clean dry and intact. Pulses present.  No s/sx of bleeding or hematoma.  VSS.  Will continue to monitor.     "

## 2020-06-03 NOTE — PROGRESS NOTES
Continued Stay Note  [facility]     Patient Name: Sumi Garay  MRN: [id number]  Today's Date: 6/3/2020    Admit Date: 6/2/2020    Discharge Plan     Row Name 06/03/20 1140       Plan    Final Discharge Disposition Code  01 - home or self-care        Discharge Codes    No documentation.       Expected Discharge Date and Time     Expected Discharge Date Expected Discharge Time    James 3, 2020             Sumi Dietrich RN

## 2020-06-04 LAB
ACT BLD: 285 SECONDS (ref 82–152)
ACT BLD: 296 SECONDS (ref 82–152)
ACT BLD: 340 SECONDS (ref 82–152)
ACT BLD: 351 SECONDS (ref 82–152)

## 2020-07-02 ENCOUNTER — OFFICE VISIT (OUTPATIENT)
Dept: CARDIOLOGY | Facility: HOSPITAL | Age: 50
End: 2020-07-02

## 2020-07-02 ENCOUNTER — HOSPITAL ENCOUNTER (OUTPATIENT)
Dept: CARDIOLOGY | Facility: HOSPITAL | Age: 50
Discharge: HOME | End: 2020-07-02
Admitting: NURSE PRACTITIONER

## 2020-07-02 VITALS
RESPIRATION RATE: 18 BRPM | HEART RATE: 77 BPM | HEIGHT: 70 IN | BODY MASS INDEX: 24.95 KG/M2 | DIASTOLIC BLOOD PRESSURE: 69 MMHG | TEMPERATURE: 97.8 F | SYSTOLIC BLOOD PRESSURE: 113 MMHG | WEIGHT: 174.25 LBS | OXYGEN SATURATION: 98 %

## 2020-07-02 DIAGNOSIS — I48.0 PAROXYSMAL ATRIAL FIBRILLATION (HCC): Primary | ICD-10-CM

## 2020-07-02 DIAGNOSIS — Z79.01 CHRONIC ANTICOAGULATION: ICD-10-CM

## 2020-07-02 DIAGNOSIS — I48.0 PAROXYSMAL ATRIAL FIBRILLATION (HCC): ICD-10-CM

## 2020-07-02 PROCEDURE — 99213 OFFICE O/P EST LOW 20 MIN: CPT | Performed by: NURSE PRACTITIONER

## 2020-07-02 PROCEDURE — 93005 ELECTROCARDIOGRAM TRACING: CPT | Performed by: NURSE PRACTITIONER

## 2020-07-02 PROCEDURE — 93010 ELECTROCARDIOGRAM REPORT: CPT | Performed by: INTERNAL MEDICINE

## 2020-07-02 NOTE — PROGRESS NOTES
[facility]  [facility]      07/02/2020         Sumi Garay  [address] [place] [address]  [unfilled]    1970    Rut Khanna MD    Sumi Garay is a 49 y.o. female.      Subjective:     Chief Complaint:  Atrial Fibrillation and Establish Care      HPI 49-year-old female presents the office for ongoing evaluation of her paroxysmal atrial fibrillation.  She underwent a pulmonary vein ablation per Dr. Gonzalez 6/3/2020. She reports that she has experienced intermittent fluttering but denies any afib episodes. She is anticoagulated with eliquis and denies s/s of bleeding.  Notes reflux had worsened post procedure but notes improvement over the last week.  Notes minimal dyspnea on exertion. Notes occasional dizziness upon awakening in the morning.       Patient Active Problem List   Diagnosis   • Atrial fibrillation with RVR    • Hypothyroidism   • Hypokalemia   • Uterine leiomyoma   • Basal cell carcinoma   • Nonsustained ventricular tachycardia versus aberrantly conducted atrial fibrillation   • Hypomagnesemia   • Leukocytosis   • Paroxysmal atrial fibrillation (CMS/HCC)       Past Medical History:   Diagnosis Date   • A-fib (CMS/HCC)    • Basal cell carcinoma 3/16/2020   • Uterine leiomyoma 3/16/2020       Past Surgical History:   Procedure Laterality Date   • CARDIAC ELECTROPHYSIOLOGY PROCEDURE N/A 6/2/2020    Procedure: PVA (paroxysmal), no meds to hold, Precision (SJM), ASAP  ;  Surgeon: Thad Gonzalez DO;  Location: [facility] INVASIVE LOCATION;  Service: Cardiovascular;  Laterality: N/A;   • MANDIBULAR AND MAXILLARY OSTEOTOMY  1993    FOR tmj    • RHINOPLASTY  2004       Family History   Problem Relation Age of Onset   • Atrial fibrillation Mother    • Heart failure Mother    • Atrial fibrillation Father        Social History     Socioeconomic History   • Marital status:      Spouse name: Not on file   • Number of children: Not on file   • Years  of education: Not on file   • Highest education level: Not on file   Tobacco Use   • Smoking status: Never Smoker   • Smokeless tobacco: Never Used   Substance and Sexual Activity   • Alcohol use: Not Currently   • Drug use: Never   • Sexual activity: Defer   Social History Narrative    caffeine use: 1 serving of coffee daily       Allergies   Allergen Reactions   • Penicillins Hives and Rash   • Percocet [Oxycodone-Acetaminophen] Itching         Current Outpatient Medications:   •  apixaban (ELIQUIS) 5 MG tablet tablet, Take 1 tablet by mouth Every 12 (Twelve) Hours. Indications: Atrial Fibrillation, Disp: 60 tablet, Rfl: 5  •  doxepin (SINEquan) 10 MG capsule, Take 10 mg by mouth Daily As Needed., Disp: , Rfl:   •  fluvoxaMINE (LUVOX) 50 MG tablet, 100 mg Every Night., Disp: , Rfl:   •  metoprolol tartrate (LOPRESSOR) 25 MG tablet, Take 0.5 tablets by mouth 2 (Two) Times a Day., Disp: 60 tablet, Rfl: 11  •  Multiple Vitamins-Minerals (MULTIVITAMIN ADULT PO), Take  by mouth Daily., Disp: , Rfl:   •  pantoprazole (Protonix) 40 MG EC tablet, Take 1 tablet by mouth Daily., Disp: 30 tablet, Rfl: 3    The following portions of the patient's history were reviewed today and updated as appropriate: allergies, current medications, past family history, past medical history, past social history, past surgical history and problem list     Review of Systems   Constitution: Negative for chills, decreased appetite, diaphoresis, fever, malaise/fatigue, night sweats, weight gain and weight loss.   HENT: Negative for congestion, hearing loss, hoarse voice and nosebleeds.    Eyes: Negative for blurred vision, visual disturbance and visual halos.   Cardiovascular: Positive for dyspnea on exertion and palpitations. Negative for chest pain, claudication, cyanosis, irregular heartbeat, leg swelling, near-syncope, orthopnea, paroxysmal nocturnal dyspnea and syncope.   Respiratory: Negative for cough, hemoptysis, shortness of breath, sleep  "disturbances due to breathing, snoring, sputum production and wheezing.    Hematologic/Lymphatic: Negative for bleeding problem. Does not bruise/bleed easily.   Skin: Negative for dry skin, itching and rash.   Musculoskeletal: Negative for arthritis, falls, joint pain, joint swelling and myalgias.   Gastrointestinal: Positive for heartburn. Negative for bloating, abdominal pain, constipation, diarrhea, flatus, hematemesis, hematochezia, melena, nausea and vomiting.   Genitourinary: Negative for dysuria, frequency, hematuria, nocturia and urgency.   Neurological: Negative for excessive daytime sleepiness, dizziness, headaches, light-headedness, loss of balance and weakness.   Psychiatric/Behavioral: Negative for depression. The patient does not have insomnia and is not nervous/anxious.        Objective:     Vitals:    07/02/20 1004 07/02/20 1005 07/02/20 1006   BP: (!) 109/38 113/71 113/69   BP Location: Right arm Right arm Left arm   Patient Position: Standing Sitting Sitting   Cuff Size: Adult Adult Adult   Pulse: 72 77 77   Resp:   18   Temp:   97.8 °F (36.6 °C)   TempSrc:   Temporal   SpO2: 98% 99% 98%   Weight:   79 kg (174 lb 4 oz)   Height:   177.8 cm (70\")       Body mass index is 25 kg/m².    Physical Exam   Constitutional: She is oriented to person, place, and time. She appears well-developed and well-nourished. She is active and cooperative. No distress.   HENT:   Head: Normocephalic and atraumatic.   Mouth/Throat: Oropharynx is clear and moist.   Eyes: Pupils are equal, round, and reactive to light. Conjunctivae and EOM are normal.   Neck: Normal range of motion. Neck supple. No JVD present. No tracheal deviation present. No thyromegaly present.   Cardiovascular: Normal rate, regular rhythm, normal heart sounds and intact distal pulses.   Pulmonary/Chest: Effort normal and breath sounds normal.   Abdominal: Soft. Bowel sounds are normal. She exhibits no distension. There is no tenderness. "   Musculoskeletal: Normal range of motion.   Neurological: She is alert and oriented to person, place, and time.   Skin: Skin is warm, dry and intact.   Psychiatric: She has a normal mood and affect. Her behavior is normal.   Nursing note and vitals reviewed.      Lab and Diagnostic Review:  Results for orders placed or performed during the hospital encounter of 06/02/20   Pregnancy, Urine - Urine, Clean Catch   Result Value Ref Range    HCG, Urine QL Negative Negative   POC Activated Clotting Time   Result Value Ref Range    Activated Clotting Time  285 (H) 82 - 152 Seconds   POC Activated Clotting Time   Result Value Ref Range    Activated Clotting Time  340 (H) 82 - 152 Seconds   POC Activated Clotting Time   Result Value Ref Range    Activated Clotting Time  351 (H) 82 - 152 Seconds   POC Activated Clotting Time   Result Value Ref Range    Activated Clotting Time  296 (H) 82 - 152 Seconds     EKG normal sinus rhythm at 74 bpm      Assessment and Plan:   1. Paroxysmal atrial fibrillation (CMS/HCC)  One month post ablation  Continue metoprolol and eliquis  - ECG 12 Lead; Future    2. Chronic anticoagulation    Anticoagulated with eliquis and denies any s/s of bleeding.           It has been a pleasure to participate in the care of this patient.  Patient was instructed to call the [facility] with any questions, concerns, or worsening symptoms.    *Please note that portions of this note were completed with a voice recognition program. Efforts were made to edit the dictations, but occasionally words are mistranscribed.      Answers for HPI/ROS submitted by the patient on 6/25/2020   What is the primary reason for your visit?: Other  Please describe your symptoms.: 1 month hospital fup- cardiac ablation 6/2 for atrial fun with SVR  Have you had these symptoms before?: Yes  How long have you been having these symptoms?: Greater than 2 weeks  Please list any medications you are currently taking for this  condition.: Metoprolol 25mg 1/2 tab bid, Eloquis 5mg bid, Protonix 40mg daily, Luvox 100mg qhs  Please describe any probable cause for these symptoms. : -

## 2020-07-09 RX ORDER — PROPAFENONE HYDROCHLORIDE 325 MG/1
CAPSULE, EXTENDED RELEASE ORAL
Qty: 174 CAPSULE | Refills: 0 | OUTPATIENT
Start: 2020-07-09

## 2020-08-24 RX ORDER — PANTOPRAZOLE SODIUM 40 MG/1
TABLET, DELAYED RELEASE ORAL
Qty: 90 TABLET | Refills: 1 | OUTPATIENT
Start: 2020-08-24

## 2020-09-14 ENCOUNTER — OFFICE VISIT (OUTPATIENT)
Dept: CARDIOLOGY | Facility: CLINIC | Age: 50
End: 2020-09-14

## 2020-09-14 VITALS
TEMPERATURE: 98.8 F | DIASTOLIC BLOOD PRESSURE: 68 MMHG | BODY MASS INDEX: 26 KG/M2 | WEIGHT: 181.6 LBS | SYSTOLIC BLOOD PRESSURE: 112 MMHG | HEART RATE: 86 BPM | OXYGEN SATURATION: 98 % | HEIGHT: 70 IN

## 2020-09-14 DIAGNOSIS — I48.0 PAF (PAROXYSMAL ATRIAL FIBRILLATION) (HCC): Primary | ICD-10-CM

## 2020-09-14 PROCEDURE — 99214 OFFICE O/P EST MOD 30 MIN: CPT | Performed by: INTERNAL MEDICINE

## 2020-09-14 PROCEDURE — 93000 ELECTROCARDIOGRAM COMPLETE: CPT | Performed by: INTERNAL MEDICINE

## 2020-09-14 NOTE — PROGRESS NOTES
Cardiac Electrophysiology Outpatient Follow Up Note            [facility] at [facility]    Follow Up Office Visit      Sumi Garay  [id number]  09/14/2020  [unfilled]  [unfilled]    Primary Care Physician: Tyrel Mehta DO    Referred By: No ref. provider found    Subjective     Chief Complaint:   Chief Complaint   Patient presents with   • PAF       History of Present Illness:   Mrs. Sumi Garay is a 49 y.o. female who presents to my electrophysiology clinic for follow up of paroxysmal atrial fibrillation status post A. fib ablation.  She is doing well.  She has had very brief episodes of mildly increased heart rate about 110 bpm but nothing like her experience with A. fib previously.  She has had 2 of these occasions that came on gradually went away gradually as well.    No chest pain nausea vomit fevers or chills..      Review of Systems:   Constitutional: No fevers or chills, no recent weight gain or weight loss or fatigue  Eyes: No visual loss, blurred vision, double vision, yellow sclerae.  ENT: No headaches, hearing loss, vertigo, congestion or sore throat.   Cardiovascular: Per HPI  Respiratory: No cough or wheezing, no sputum production, no hematemesis   Gastrointestinal: No abdominal pain, no nausea, vomiting, constipation, diarrhea, melena.   Genitourinary: No dysuria, hematuria or increased frequency.  Musculoskeletal:  No gait disturbance, weakness or joint pain or stiffness  Integumentary: No rashes, urticaria, ulcers or sores.   Neurological: No headache, dizziness, syncope, paralysis, ataxia, no prior CVA/TIA  Psychiatric: No anxiety, or depression  Endocrine: No diaphoresis, cold or heat intolerance. No polyuria or polydipsia.   Hematologic/Lymphatic: No anemia, abnormal bruising or bleeding. No history of DVT/PE.      Past Medical History:   Past Medical History:   Diagnosis Date   • A-fib (CMS/HCC)    • Basal cell carcinoma 3/16/2020   "  • Uterine leiomyoma 3/16/2020       Past Surgical History:   Past Surgical History:   Procedure Laterality Date   • CARDIAC ELECTROPHYSIOLOGY PROCEDURE N/A 6/2/2020    Procedure: PVA (paroxysmal), no meds to hold, Precision (SJM), ASAP  ;  Surgeon: Thad Gonzalez DO;  Location: [facility] INVASIVE LOCATION;  Service: Cardiovascular;  Laterality: N/A;   • MANDIBULAR AND MAXILLARY OSTEOTOMY  1993    FOR tmj    • RHINOPLASTY  2004       Family History:   Family History   Problem Relation Age of Onset   • Atrial fibrillation Mother    • Heart failure Mother    • Atrial fibrillation Father        Social History:   Social History     Socioeconomic History   • Marital status:      Spouse name: Not on file   • Number of children: Not on file   • Years of education: Not on file   • Highest education level: Not on file   Tobacco Use   • Smoking status: Never Smoker   • Smokeless tobacco: Never Used   Substance and Sexual Activity   • Alcohol use: Yes     Alcohol/week: 1.0 - 2.0 standard drinks     Types: 1 - 2 Glasses of wine per week     Comment: ocass   • Drug use: Never   • Sexual activity: Defer   Social History Narrative    caffeine use: 1 serving of coffee daily       Medications:     Current Outpatient Medications:   •  doxepin (SINEquan) 10 MG capsule, Take 10 mg by mouth Daily As Needed., Disp: , Rfl:   •  fluvoxaMINE (LUVOX) 50 MG tablet, 100 mg Every Night., Disp: , Rfl:   •  metoprolol tartrate (LOPRESSOR) 25 MG tablet, Take 0.5 tablets by mouth 2 (Two) Times a Day., Disp: 60 tablet, Rfl: 11    Allergies:   Allergies   Allergen Reactions   • Penicillins Hives and Rash   • Percocet [Oxycodone-Acetaminophen] Itching       Objective     Physical Exam:  Vital Signs:   Vitals:    09/14/20 1413   BP: 112/68   BP Location: Left arm   Patient Position: Sitting   Pulse: 86   Temp: 98.8 °F (37.1 °C)   SpO2: 98%   Weight: 82.4 kg (181 lb 9.6 oz)   Height: 177.8 cm (70\")     GEN: Well nourished, well-developed, no " acute distress  HEENT: Normocephalic, atraumatic, moist mucous membranes  NECK: Supple, no JVD, no thyromegaly, no lymphadenopathy  CARD: Regular rate and rhythm, normal S1 & S2 are present.  No murmur, gallop or rubs are appreciated.  LUNGS: Clear to auscultation bilateraly, normal respiratory effort  ABDOMEN: Soft, nontender, normal bowel sounds  EXTREMITIES: No gross deformities, no clubbing, cyanosis.  Edema none  SKIN: Warm, dry  NEURO: No focal deficits, alert and oriented x 3  PSYCHIATRIC: Normal affect and mood, appropriate use of semantics and logic.        Lab Results   Component Value Date    GLUCOSE 90 05/31/2020    CALCIUM 9.2 05/31/2020     05/31/2020    K 5.0 05/31/2020    CO2 28.0 05/31/2020     05/31/2020    BUN 13 05/31/2020    CREATININE 1.07 (H) 05/31/2020    EGFRIFNONA 55 (L) 05/31/2020    BCR 12.1 05/31/2020    ANIONGAP 10.0 05/31/2020     Lab Results   Component Value Date    WBC 5.22 05/31/2020    HGB 12.5 05/31/2020    HCT 40.6 05/31/2020    MCV 88.1 05/31/2020     05/31/2020     No results found for: INR, PROTIME  Lab Results   Component Value Date    TSH 1.710 04/15/2020       Cardiac Testing:     I personally viewed and interpreted the patient's EKG/Telemetry/lab data      ECG 12 Lead    Date/Time: 9/14/2020 3:03 PM  Performed by: Thad Gonzalez DO  Authorized by: Thad Gonzalez DO   Comparison: compared with previous ECG   Similar to previous ECG  Rhythm: sinus rhythm            Tobacco Cessation: N/A  Obstructive Sleep Apnea Screening: N/A    Assessment & Plan      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 fib.  She is here for follow-up at the three-month interval.  She is an exceptionally well.  No A. fib episodes no chest pain nausea vomit fevers or chills and certainly no suggestion of recurrence of A. fib.    I reviewed the procedure where they were we performed wide antral  circumferential pulmonary vein isolation and additionally also isolation of the superior vena cava.    Organ to stop her apixaban.    She will wean her metoprolol.    I will see her back in 1 years time.    There are no diagnoses linked to this encounter.        Follow Up:       Thank you for allowing me to participate in the care of your patient. Please to not hesitate to contact me with additional questions or concerns.        Thad Gonzalez DO, FAC, RUST  Cardiac Electrophysiologist

## 2020-11-19 ENCOUNTER — HOSPITAL ENCOUNTER (OUTPATIENT)
Facility: HOSPITAL | Age: 50
Discharge: HOME | End: 2020-11-20
Attending: EMERGENCY MEDICINE | Admitting: INTERNAL MEDICINE

## 2020-11-19 ENCOUNTER — APPOINTMENT (OUTPATIENT)
Dept: GENERAL RADIOLOGY | Facility: HOSPITAL | Age: 50
End: 2020-11-19

## 2020-11-19 DIAGNOSIS — R07.2 PRECORDIAL CHEST PAIN: Primary | ICD-10-CM

## 2020-11-19 PROBLEM — E83.42 HYPOMAGNESEMIA: Status: ACTIVE | Noted: 2020-11-19

## 2020-11-19 LAB
ALBUMIN SERPL-MCNC: 4.2 G/DL (ref 3.5–5.2)
ALBUMIN/GLOB SERPL: 1.8 G/DL
ALP SERPL-CCNC: 74 U/L (ref 39–117)
ALT SERPL W P-5'-P-CCNC: 10 U/L (ref 1–33)
ANION GAP SERPL CALCULATED.3IONS-SCNC: 11 MMOL/L (ref 5–15)
AST SERPL-CCNC: 13 U/L (ref 1–32)
B-HCG UR QL: NEGATIVE
BASOPHILS # BLD AUTO: 0.05 10*3/MM3 (ref 0–0.2)
BASOPHILS NFR BLD AUTO: 0.5 % (ref 0–1.5)
BILIRUB SERPL-MCNC: 0.2 MG/DL (ref 0–1.2)
BUN SERPL-MCNC: 12 MG/DL (ref 6–20)
BUN/CREAT SERPL: 15.8 (ref 7–25)
CALCIUM SPEC-SCNC: 9 MG/DL (ref 8.6–10.5)
CHLORIDE SERPL-SCNC: 104 MMOL/L (ref 98–107)
CO2 SERPL-SCNC: 24 MMOL/L (ref 22–29)
CREAT SERPL-MCNC: 0.76 MG/DL (ref 0.57–1)
DEPRECATED RDW RBC AUTO: 40.4 FL (ref 37–54)
EOSINOPHIL # BLD AUTO: 0.1 10*3/MM3 (ref 0–0.4)
EOSINOPHIL NFR BLD AUTO: 1 % (ref 0.3–6.2)
ERYTHROCYTE [DISTWIDTH] IN BLOOD BY AUTOMATED COUNT: 12.9 % (ref 12.3–15.4)
GFR SERPL CREATININE-BSD FRML MDRD: 81 ML/MIN/1.73
GLOBULIN UR ELPH-MCNC: 2.4 GM/DL
GLUCOSE SERPL-MCNC: 121 MG/DL (ref 65–99)
HCT VFR BLD AUTO: 37.3 % (ref 34–46.6)
HGB BLD-MCNC: 11.8 G/DL (ref 12–15.9)
HOLD SPECIMEN: NORMAL
HOLD SPECIMEN: NORMAL
IMM GRANULOCYTES # BLD AUTO: 0.03 10*3/MM3 (ref 0–0.05)
IMM GRANULOCYTES NFR BLD AUTO: 0.3 % (ref 0–0.5)
INTERNAL NEGATIVE CONTROL: NEGATIVE
INTERNAL POSITIVE CONTROL: POSITIVE
LIPASE SERPL-CCNC: 58 U/L (ref 13–60)
LYMPHOCYTES # BLD AUTO: 2.13 10*3/MM3 (ref 0.7–3.1)
LYMPHOCYTES NFR BLD AUTO: 21.7 % (ref 19.6–45.3)
Lab: NORMAL
MAGNESIUM SERPL-MCNC: 1.7 MG/DL (ref 1.6–2.6)
MCH RBC QN AUTO: 27 PG (ref 26.6–33)
MCHC RBC AUTO-ENTMCNC: 31.6 G/DL (ref 31.5–35.7)
MCV RBC AUTO: 85.4 FL (ref 79–97)
MONOCYTES # BLD AUTO: 0.54 10*3/MM3 (ref 0.1–0.9)
MONOCYTES NFR BLD AUTO: 5.5 % (ref 5–12)
NEUTROPHILS NFR BLD AUTO: 6.96 10*3/MM3 (ref 1.7–7)
NEUTROPHILS NFR BLD AUTO: 71 % (ref 42.7–76)
NRBC BLD AUTO-RTO: 0 /100 WBC (ref 0–0.2)
NT-PROBNP SERPL-MCNC: 115.1 PG/ML (ref 0–450)
PLATELET # BLD AUTO: 277 10*3/MM3 (ref 140–450)
PMV BLD AUTO: 10.5 FL (ref 6–12)
POTASSIUM SERPL-SCNC: 3.8 MMOL/L (ref 3.5–5.2)
PROT SERPL-MCNC: 6.6 G/DL (ref 6–8.5)
QT INTERVAL: 340 MS
QTC INTERVAL: 453 MS
RBC # BLD AUTO: 4.37 10*6/MM3 (ref 3.77–5.28)
SARS-COV-2 RDRP RESP QL NAA+PROBE: NOT DETECTED
SODIUM SERPL-SCNC: 139 MMOL/L (ref 136–145)
TROPONIN T SERPL-MCNC: <0.01 NG/ML (ref 0–0.03)
WBC # BLD AUTO: 9.81 10*3/MM3 (ref 3.4–10.8)
WHOLE BLOOD HOLD SPECIMEN: NORMAL
WHOLE BLOOD HOLD SPECIMEN: NORMAL

## 2020-11-19 PROCEDURE — 83735 ASSAY OF MAGNESIUM: CPT | Performed by: NURSE PRACTITIONER

## 2020-11-19 PROCEDURE — 93005 ELECTROCARDIOGRAM TRACING: CPT | Performed by: EMERGENCY MEDICINE

## 2020-11-19 PROCEDURE — 81025 URINE PREGNANCY TEST: CPT | Performed by: EMERGENCY MEDICINE

## 2020-11-19 PROCEDURE — 99218 PR INITIAL OBSERVATION CARE/DAY 30 MINUTES: CPT | Performed by: NURSE PRACTITIONER

## 2020-11-19 PROCEDURE — 84484 ASSAY OF TROPONIN QUANT: CPT | Performed by: EMERGENCY MEDICINE

## 2020-11-19 PROCEDURE — 87635 SARS-COV-2 COVID-19 AMP PRB: CPT | Performed by: EMERGENCY MEDICINE

## 2020-11-19 PROCEDURE — 99284 EMERGENCY DEPT VISIT MOD MDM: CPT

## 2020-11-19 PROCEDURE — G0378 HOSPITAL OBSERVATION PER HR: HCPCS

## 2020-11-19 PROCEDURE — 85379 FIBRIN DEGRADATION QUANT: CPT | Performed by: INTERNAL MEDICINE

## 2020-11-19 PROCEDURE — 25010000002 ENOXAPARIN PER 10 MG: Performed by: EMERGENCY MEDICINE

## 2020-11-19 PROCEDURE — 83880 ASSAY OF NATRIURETIC PEPTIDE: CPT | Performed by: EMERGENCY MEDICINE

## 2020-11-19 PROCEDURE — 80053 COMPREHEN METABOLIC PANEL: CPT | Performed by: EMERGENCY MEDICINE

## 2020-11-19 PROCEDURE — 96372 THER/PROPH/DIAG INJ SC/IM: CPT

## 2020-11-19 PROCEDURE — 85025 COMPLETE CBC W/AUTO DIFF WBC: CPT | Performed by: EMERGENCY MEDICINE

## 2020-11-19 PROCEDURE — 71045 X-RAY EXAM CHEST 1 VIEW: CPT

## 2020-11-19 PROCEDURE — C9803 HOPD COVID-19 SPEC COLLECT: HCPCS

## 2020-11-19 PROCEDURE — 83690 ASSAY OF LIPASE: CPT | Performed by: EMERGENCY MEDICINE

## 2020-11-19 RX ORDER — NITROGLYCERIN 20 MG/100ML
5-200 INJECTION INTRAVENOUS
Status: DISCONTINUED | OUTPATIENT
Start: 2020-11-19 | End: 2020-11-20 | Stop reason: HOSPADM

## 2020-11-19 RX ORDER — SODIUM CHLORIDE 0.9 % (FLUSH) 0.9 %
10 SYRINGE (ML) INJECTION AS NEEDED
Status: DISCONTINUED | OUTPATIENT
Start: 2020-11-19 | End: 2020-11-20 | Stop reason: HOSPADM

## 2020-11-19 RX ORDER — NITROGLYCERIN 0.4 MG/1
0.4 TABLET SUBLINGUAL
Status: DISCONTINUED | OUTPATIENT
Start: 2020-11-19 | End: 2020-11-20 | Stop reason: HOSPADM

## 2020-11-19 RX ADMIN — ENOXAPARIN SODIUM 80 MG: 80 INJECTION SUBCUTANEOUS at 22:41

## 2020-11-20 ENCOUNTER — APPOINTMENT (OUTPATIENT)
Dept: CARDIOLOGY | Facility: HOSPITAL | Age: 50
End: 2020-11-20

## 2020-11-20 VITALS
HEART RATE: 82 BPM | WEIGHT: 206 LBS | DIASTOLIC BLOOD PRESSURE: 78 MMHG | TEMPERATURE: 98.5 F | HEIGHT: 70 IN | BODY MASS INDEX: 29.49 KG/M2 | OXYGEN SATURATION: 98 % | RESPIRATION RATE: 18 BRPM | SYSTOLIC BLOOD PRESSURE: 121 MMHG

## 2020-11-20 LAB
ANION GAP SERPL CALCULATED.3IONS-SCNC: 9 MMOL/L (ref 5–15)
BASOPHILS # BLD AUTO: 0.03 10*3/MM3 (ref 0–0.2)
BASOPHILS NFR BLD AUTO: 0.3 % (ref 0–1.5)
BH CV ECHO MEAS - AO ROOT AREA (BSA CORRECTED): 1.3
BH CV ECHO MEAS - AO ROOT AREA: 6 CM^2
BH CV ECHO MEAS - AO ROOT DIAM: 2.8 CM
BH CV ECHO MEAS - BSA(HAYCOCK): 2.2 M^2
BH CV ECHO MEAS - BSA: 2.1 M^2
BH CV ECHO MEAS - BZI_BMI: 29.6 KILOGRAMS/M^2
BH CV ECHO MEAS - BZI_METRIC_HEIGHT: 177.8 CM
BH CV ECHO MEAS - BZI_METRIC_WEIGHT: 93.4 KG
BH CV ECHO MEAS - EDV(CUBED): 90.7 ML
BH CV ECHO MEAS - EDV(MOD-SP2): 66 ML
BH CV ECHO MEAS - EDV(MOD-SP4): 72 ML
BH CV ECHO MEAS - EDV(TEICH): 92.1 ML
BH CV ECHO MEAS - EF(CUBED): 76.2 %
BH CV ECHO MEAS - EF(MOD-BP): 67 %
BH CV ECHO MEAS - EF(MOD-SP2): 71.2 %
BH CV ECHO MEAS - EF(MOD-SP4): 59.7 %
BH CV ECHO MEAS - EF(TEICH): 68.4 %
BH CV ECHO MEAS - ESV(CUBED): 21.6 ML
BH CV ECHO MEAS - ESV(MOD-SP2): 19 ML
BH CV ECHO MEAS - ESV(MOD-SP4): 29 ML
BH CV ECHO MEAS - ESV(TEICH): 29.1 ML
BH CV ECHO MEAS - FS: 38 %
BH CV ECHO MEAS - IVS/LVPW: 0.92
BH CV ECHO MEAS - IVSD: 0.73 CM
BH CV ECHO MEAS - LA DIMENSION: 3.2 CM
BH CV ECHO MEAS - LA/AO: 1.1
BH CV ECHO MEAS - LV DIASTOLIC VOL/BSA (35-75): 34.1 ML/M^2
BH CV ECHO MEAS - LV MASS(C)D: 106.9 GRAMS
BH CV ECHO MEAS - LV MASS(C)DI: 50.6 GRAMS/M^2
BH CV ECHO MEAS - LV SYSTOLIC VOL/BSA (12-30): 13.7 ML/M^2
BH CV ECHO MEAS - LVIDD: 4.5 CM
BH CV ECHO MEAS - LVIDS: 2.8 CM
BH CV ECHO MEAS - LVLD AP2: 7.3 CM
BH CV ECHO MEAS - LVLD AP4: 7.4 CM
BH CV ECHO MEAS - LVLS AP2: 6.7 CM
BH CV ECHO MEAS - LVLS AP4: 6.9 CM
BH CV ECHO MEAS - LVOT AREA (M): 2.8 CM^2
BH CV ECHO MEAS - LVOT AREA: 3 CM^2
BH CV ECHO MEAS - LVOT DIAM: 1.9 CM
BH CV ECHO MEAS - LVPWD: 0.8 CM
BH CV ECHO MEAS - SI(CUBED): 32.7 ML/M^2
BH CV ECHO MEAS - SI(MOD-SP2): 22.2 ML/M^2
BH CV ECHO MEAS - SI(MOD-SP4): 20.3 ML/M^2
BH CV ECHO MEAS - SI(TEICH): 29.8 ML/M^2
BH CV ECHO MEAS - SV(CUBED): 69.1 ML
BH CV ECHO MEAS - SV(MOD-SP2): 47 ML
BH CV ECHO MEAS - SV(MOD-SP4): 43 ML
BH CV ECHO MEAS - SV(TEICH): 63 ML
BUN SERPL-MCNC: 11 MG/DL (ref 6–20)
BUN/CREAT SERPL: 17.2 (ref 7–25)
CALCIUM SPEC-SCNC: 8.9 MG/DL (ref 8.6–10.5)
CHLORIDE SERPL-SCNC: 105 MMOL/L (ref 98–107)
CHOLEST SERPL-MCNC: 188 MG/DL (ref 0–200)
CO2 SERPL-SCNC: 26 MMOL/L (ref 22–29)
CREAT SERPL-MCNC: 0.64 MG/DL (ref 0.57–1)
D DIMER PPP FEU-MCNC: 0.43 MCGFEU/ML (ref 0–0.56)
DEPRECATED RDW RBC AUTO: 40.2 FL (ref 37–54)
EOSINOPHIL # BLD AUTO: 0.12 10*3/MM3 (ref 0–0.4)
EOSINOPHIL NFR BLD AUTO: 1.1 % (ref 0.3–6.2)
ERYTHROCYTE [DISTWIDTH] IN BLOOD BY AUTOMATED COUNT: 13.2 % (ref 12.3–15.4)
GFR SERPL CREATININE-BSD FRML MDRD: 99 ML/MIN/1.73
GLUCOSE SERPL-MCNC: 107 MG/DL (ref 65–99)
HBA1C MFR BLD: 5.6 % (ref 4.8–5.6)
HCT VFR BLD AUTO: 35 % (ref 34–46.6)
HDLC SERPL-MCNC: 70 MG/DL (ref 40–60)
HGB BLD-MCNC: 10.9 G/DL (ref 12–15.9)
IMM GRANULOCYTES # BLD AUTO: 0.01 10*3/MM3 (ref 0–0.05)
IMM GRANULOCYTES NFR BLD AUTO: 0.1 % (ref 0–0.5)
LDLC SERPL CALC-MCNC: 99 MG/DL (ref 0–100)
LDLC/HDLC SERPL: 1.39 {RATIO}
LYMPHOCYTES # BLD AUTO: 2.3 10*3/MM3 (ref 0.7–3.1)
LYMPHOCYTES NFR BLD AUTO: 21.7 % (ref 19.6–45.3)
MAGNESIUM SERPL-MCNC: 2.5 MG/DL (ref 1.6–2.6)
MAXIMAL PREDICTED HEART RATE: 171 BPM
MCH RBC QN AUTO: 26.3 PG (ref 26.6–33)
MCHC RBC AUTO-ENTMCNC: 31.1 G/DL (ref 31.5–35.7)
MCV RBC AUTO: 84.3 FL (ref 79–97)
MONOCYTES # BLD AUTO: 0.57 10*3/MM3 (ref 0.1–0.9)
MONOCYTES NFR BLD AUTO: 5.4 % (ref 5–12)
NEUTROPHILS NFR BLD AUTO: 7.56 10*3/MM3 (ref 1.7–7)
NEUTROPHILS NFR BLD AUTO: 71.4 % (ref 42.7–76)
NRBC BLD AUTO-RTO: 0 /100 WBC (ref 0–0.2)
PLATELET # BLD AUTO: 261 10*3/MM3 (ref 140–450)
PMV BLD AUTO: 10.3 FL (ref 6–12)
POTASSIUM SERPL-SCNC: 4 MMOL/L (ref 3.5–5.2)
QT INTERVAL: 378 MS
QT INTERVAL: 382 MS
QTC INTERVAL: 440 MS
QTC INTERVAL: 449 MS
RBC # BLD AUTO: 4.15 10*6/MM3 (ref 3.77–5.28)
SODIUM SERPL-SCNC: 140 MMOL/L (ref 136–145)
STRESS TARGET HR: 145 BPM
TRIGL SERPL-MCNC: 105 MG/DL (ref 0–150)
TROPONIN T SERPL-MCNC: <0.01 NG/ML (ref 0–0.03)
TSH SERPL DL<=0.05 MIU/L-ACNC: 1.35 UIU/ML (ref 0.27–4.2)
VLDLC SERPL-MCNC: 19 MG/DL (ref 5–40)
WBC # BLD AUTO: 10.59 10*3/MM3 (ref 3.4–10.8)

## 2020-11-20 PROCEDURE — 83735 ASSAY OF MAGNESIUM: CPT | Performed by: NURSE PRACTITIONER

## 2020-11-20 PROCEDURE — 84484 ASSAY OF TROPONIN QUANT: CPT | Performed by: NURSE PRACTITIONER

## 2020-11-20 PROCEDURE — 93308 TTE F-UP OR LMTD: CPT | Performed by: INTERNAL MEDICINE

## 2020-11-20 PROCEDURE — 96365 THER/PROPH/DIAG IV INF INIT: CPT

## 2020-11-20 PROCEDURE — 25010000002 SULFUR HEXAFLUORIDE MICROSPH 60.7-25 MG RECONSTITUTED SUSPENSION: Performed by: INTERNAL MEDICINE

## 2020-11-20 PROCEDURE — 25010000002 MIDAZOLAM PER 1 MG: Performed by: INTERNAL MEDICINE

## 2020-11-20 PROCEDURE — 25010000003 MAGNESIUM SULFATE 4 GM/100ML SOLUTION: Performed by: NURSE PRACTITIONER

## 2020-11-20 PROCEDURE — 96361 HYDRATE IV INFUSION ADD-ON: CPT

## 2020-11-20 PROCEDURE — 83036 HEMOGLOBIN GLYCOSYLATED A1C: CPT | Performed by: NURSE PRACTITIONER

## 2020-11-20 PROCEDURE — 99214 OFFICE O/P EST MOD 30 MIN: CPT | Performed by: INTERNAL MEDICINE

## 2020-11-20 PROCEDURE — C1769 GUIDE WIRE: HCPCS | Performed by: INTERNAL MEDICINE

## 2020-11-20 PROCEDURE — 84484 ASSAY OF TROPONIN QUANT: CPT | Performed by: INTERNAL MEDICINE

## 2020-11-20 PROCEDURE — 80048 BASIC METABOLIC PNL TOTAL CA: CPT | Performed by: NURSE PRACTITIONER

## 2020-11-20 PROCEDURE — 25010000002 FENTANYL CITRATE (PF) 100 MCG/2ML SOLUTION: Performed by: INTERNAL MEDICINE

## 2020-11-20 PROCEDURE — 85025 COMPLETE CBC W/AUTO DIFF WBC: CPT | Performed by: NURSE PRACTITIONER

## 2020-11-20 PROCEDURE — G0378 HOSPITAL OBSERVATION PER HR: HCPCS

## 2020-11-20 PROCEDURE — 0 IOPAMIDOL PER 1 ML: Performed by: INTERNAL MEDICINE

## 2020-11-20 PROCEDURE — 96366 THER/PROPH/DIAG IV INF ADDON: CPT

## 2020-11-20 PROCEDURE — 84443 ASSAY THYROID STIM HORMONE: CPT | Performed by: NURSE PRACTITIONER

## 2020-11-20 PROCEDURE — 25010000003 LIDOCAINE 1 % SOLUTION: Performed by: INTERNAL MEDICINE

## 2020-11-20 PROCEDURE — 93458 L HRT ARTERY/VENTRICLE ANGIO: CPT | Performed by: INTERNAL MEDICINE

## 2020-11-20 PROCEDURE — 80061 LIPID PANEL: CPT | Performed by: NURSE PRACTITIONER

## 2020-11-20 PROCEDURE — 93005 ELECTROCARDIOGRAM TRACING: CPT | Performed by: NURSE PRACTITIONER

## 2020-11-20 PROCEDURE — 93308 TTE F-UP OR LMTD: CPT

## 2020-11-20 PROCEDURE — 99224 PR SBSQ OBSERVATION CARE/DAY 15 MINUTES: CPT | Performed by: NURSE PRACTITIONER

## 2020-11-20 PROCEDURE — C1894 INTRO/SHEATH, NON-LASER: HCPCS | Performed by: INTERNAL MEDICINE

## 2020-11-20 RX ORDER — FLUVOXAMINE MALEATE 50 MG/1
100 TABLET, COATED ORAL NIGHTLY
Status: DISCONTINUED | OUTPATIENT
Start: 2020-11-20 | End: 2020-11-20 | Stop reason: HOSPADM

## 2020-11-20 RX ORDER — ACETAMINOPHEN 160 MG/5ML
650 SOLUTION ORAL EVERY 4 HOURS PRN
Status: DISCONTINUED | OUTPATIENT
Start: 2020-11-20 | End: 2020-11-20 | Stop reason: HOSPADM

## 2020-11-20 RX ORDER — MAGNESIUM SULFATE HEPTAHYDRATE 40 MG/ML
2 INJECTION, SOLUTION INTRAVENOUS AS NEEDED
Status: DISCONTINUED | OUTPATIENT
Start: 2020-11-20 | End: 2020-11-20 | Stop reason: HOSPADM

## 2020-11-20 RX ORDER — ACETAMINOPHEN 325 MG/1
650 TABLET ORAL EVERY 4 HOURS PRN
Status: DISCONTINUED | OUTPATIENT
Start: 2020-11-20 | End: 2020-11-20 | Stop reason: HOSPADM

## 2020-11-20 RX ORDER — LIDOCAINE HYDROCHLORIDE 10 MG/ML
INJECTION, SOLUTION INFILTRATION; PERINEURAL AS NEEDED
Status: DISCONTINUED | OUTPATIENT
Start: 2020-11-20 | End: 2020-11-20 | Stop reason: HOSPADM

## 2020-11-20 RX ORDER — METOPROLOL SUCCINATE 50 MG/1
50 TABLET, EXTENDED RELEASE ORAL DAILY
Qty: 30 TABLET | Refills: 11 | Status: SHIPPED | OUTPATIENT
Start: 2020-11-20 | End: 2021-12-06

## 2020-11-20 RX ORDER — CLOPIDOGREL BISULFATE 75 MG/1
600 TABLET ORAL ONCE
Status: COMPLETED | OUTPATIENT
Start: 2020-11-20 | End: 2020-11-20

## 2020-11-20 RX ORDER — SODIUM CHLORIDE 0.9 % (FLUSH) 0.9 %
10 SYRINGE (ML) INJECTION AS NEEDED
Status: DISCONTINUED | OUTPATIENT
Start: 2020-11-20 | End: 2020-11-20 | Stop reason: HOSPADM

## 2020-11-20 RX ORDER — FENTANYL CITRATE 50 UG/ML
INJECTION, SOLUTION INTRAMUSCULAR; INTRAVENOUS AS NEEDED
Status: DISCONTINUED | OUTPATIENT
Start: 2020-11-20 | End: 2020-11-20 | Stop reason: HOSPADM

## 2020-11-20 RX ORDER — MAGNESIUM SULFATE HEPTAHYDRATE 40 MG/ML
4 INJECTION, SOLUTION INTRAVENOUS AS NEEDED
Status: DISCONTINUED | OUTPATIENT
Start: 2020-11-20 | End: 2020-11-20 | Stop reason: HOSPADM

## 2020-11-20 RX ORDER — ONDANSETRON 2 MG/ML
4 INJECTION INTRAMUSCULAR; INTRAVENOUS EVERY 6 HOURS PRN
Status: DISCONTINUED | OUTPATIENT
Start: 2020-11-20 | End: 2020-11-20 | Stop reason: HOSPADM

## 2020-11-20 RX ORDER — ASPIRIN 81 MG/1
81 TABLET ORAL DAILY
Status: DISCONTINUED | OUTPATIENT
Start: 2020-11-21 | End: 2020-11-20 | Stop reason: HOSPADM

## 2020-11-20 RX ORDER — SODIUM CHLORIDE 0.9 % (FLUSH) 0.9 %
10 SYRINGE (ML) INJECTION EVERY 12 HOURS SCHEDULED
Status: DISCONTINUED | OUTPATIENT
Start: 2020-11-20 | End: 2020-11-20 | Stop reason: HOSPADM

## 2020-11-20 RX ORDER — MIDAZOLAM HYDROCHLORIDE 1 MG/ML
INJECTION INTRAMUSCULAR; INTRAVENOUS AS NEEDED
Status: DISCONTINUED | OUTPATIENT
Start: 2020-11-20 | End: 2020-11-20 | Stop reason: HOSPADM

## 2020-11-20 RX ORDER — ACETAMINOPHEN 650 MG/1
650 SUPPOSITORY RECTAL EVERY 4 HOURS PRN
Status: DISCONTINUED | OUTPATIENT
Start: 2020-11-20 | End: 2020-11-20 | Stop reason: HOSPADM

## 2020-11-20 RX ORDER — SODIUM CHLORIDE 9 MG/ML
1-3 INJECTION, SOLUTION INTRAVENOUS CONTINUOUS
Status: DISCONTINUED | OUTPATIENT
Start: 2020-11-20 | End: 2020-11-20 | Stop reason: HOSPADM

## 2020-11-20 RX ADMIN — SODIUM CHLORIDE 3 ML/KG/HR: 9 INJECTION, SOLUTION INTRAVENOUS at 16:20

## 2020-11-20 RX ADMIN — MAGNESIUM SULFATE HEPTAHYDRATE 4 G: 40 INJECTION, SOLUTION INTRAVENOUS at 01:27

## 2020-11-20 RX ADMIN — SODIUM CHLORIDE, PRESERVATIVE FREE 10 ML: 5 INJECTION INTRAVENOUS at 08:31

## 2020-11-20 RX ADMIN — SODIUM CHLORIDE, PRESERVATIVE FREE 10 ML: 5 INJECTION INTRAVENOUS at 01:28

## 2020-11-20 RX ADMIN — CLOPIDOGREL BISULFATE 600 MG: 75 TABLET ORAL at 12:50

## 2020-11-20 RX ADMIN — SODIUM CHLORIDE 3 ML/KG/HR: 9 INJECTION, SOLUTION INTRAVENOUS at 12:51

## 2020-11-20 RX ADMIN — SULFUR HEXAFLUORIDE 2 ML: KIT at 18:30

## 2020-11-20 NOTE — CONSULTS
[facility] at [facility]  CARDIOLOGY CONSULTATION NOTE    Sumi Garay  : 1970  MRN:[id number]  Home Phone:[phone]    Date of Admission:2020  Date of Consultation: 20    PCP: Tyrel Mehta DO    IDENTIFICATION: A 50 y.o. female resident of [place], [place]     Chief Complaint   Patient presents with   • Chest Pain       PROBLEM LIST:   1. Chest pain   a. [facility] admission with unstable angina, 2020   2. Paroxysmal atrial fibrillation   a. occurred in setting of use of Armor Thyroid for possible hypothyroidism in past.   b. Failed ECV x 2, Flecainide and Rythmol   c. Echo 2020 with EF 60%   d. CHADSVASC= 1  e. S/p PVA 2020, Dr. Gonzalez  3. Anxiety     ALLERGIES:   Allergies   Allergen Reactions   • Penicillins Hives and Rash   • Percocet [Oxycodone-Acetaminophen] Itching       HOME MEDICINES:   Prior to Admission Medications     Prescriptions Last Dose Informant Patient Reported? Taking?    doxepin (SINEquan) 10 MG capsule  Medication Bottle Yes Yes    Take 10 mg by mouth Daily As Needed.    fluvoxaMINE (LUVOX) 50 MG tablet  Medication Bottle Yes Yes    100 mg Every Night.     HPI: Mrs. Garay is a pleasant 51 y/o WF with history of PAF, s/p ablation in 2020 per Dr. Gonzalez who presents to the hospital with chest pain. She reports a 5 day history of intermittent left scapular pain and discomfort. Last night, after getting out of her bath, she developed acute midsternal chest heaviness with radiation up to her neck and left scapular pain, associated with shortness of breath, nausea and diaphoresis. EMS were called and initial EKG is reported to have significant ST depression, however details are not available. She was given SL Nitro, ASA and Nitropaste en route to the hospital and reports this eased off her pain. On arrival to the ER, her EKG did not show any acute ST changes, and her troponins have remained negative x 3. She reports her  "midsternal heaviness and scapular discomfort finally eased off around 1 am last night. She has not had recurrent symptoms. She was taken off Eliquis and Metoprolol at the end of September by Dr. Gonzalez as she has not had recurrent atrial fibrillation following her ablation in June. She denies history of MI, CVA, DVT or PE. No tobacco, alcohol or drug use. Family history is significant for premature CAD in her paternal grandparents, as well as atrial fibrillation in both parents. She is a nurse at [employer], overseeing Covid protocols, and reports recent increased stress in her life.       ROS: All systems have been reviewed and are negative with the exception of those mentioned in the HPI and problem list above.    Surgical History:   Past Surgical History:   Procedure Laterality Date   • CARDIAC ELECTROPHYSIOLOGY PROCEDURE N/A 6/2/2020    Procedure: PVA (paroxysmal), no meds to hold, Precision (SJM), ASAP  ;  Surgeon: Thad Gonzalez DO;  Location: [facility] INVASIVE LOCATION;  Service: Cardiovascular;  Laterality: N/A;   • MANDIBULAR AND MAXILLARY OSTEOTOMY  1993    FOR tmj    • RHINOPLASTY  2004       Social History:   Social History     Socioeconomic History   • Marital status:    Tobacco Use   • Smoking status: Never Smoker   • Smokeless tobacco: Never Used   Substance and Sexual Activity   • Alcohol use: Yes     Alcohol/week: 1.0 - 2.0 standard drinks     Types: 1 - 2 Glasses of wine per week     Comment: ocass   • Drug use: Never   • Sexual activity: Defer       Family History:   Family History   Problem Relation Age of Onset   • Atrial fibrillation Mother    • Heart failure Mother    • Atrial fibrillation Father        Objective     /60 (BP Location: Right arm, Patient Position: Lying)   Pulse 84   Temp 98.1 °F (36.7 °C) (Oral)   Resp 16   Ht 177.8 cm (70\")   Wt 93.4 kg (206 lb)   SpO2 97%   BMI 29.56 kg/m²     Intake/Output Summary (Last 24 hours) at 11/20/2020 1015  Last data " filed at 11/20/2020 0742  Gross per 24 hour   Intake 525 ml   Output --   Net 525 ml       PHYSICAL EXAM:  CONSTITUTIONAL: Well nourished, cooperative, in no acute distress  HEENT: Normocephalic, atraumatic, PERRLA, no JVD  CARDIOVASCULAR:  Regular rhythm and normal rate, no murmur, gallop, rub. Peripheral pulses are present and equal bilaterally  RESPIRATORY: Clear to auscultation, normal respiratory effort, no wheezing, rales or rhonchi  GI: Soft, nontender, normal bowel sounds  MUSCULOSKELETAL: No gross deformities, no edema  SKIN: Warm, dry. No bleeding, bruising or rash  NEUROLOGICAL: No focal deficits  PSYCHIATRIC: Normal mood and affect. Behavior is normal     Labs/Diagnostic Data  Results from last 7 days   Lab Units 11/20/20 0318 11/19/20 2053   SODIUM mmol/L 140 139   POTASSIUM mmol/L 4.0 3.8   CHLORIDE mmol/L 105 104   CO2 mmol/L 26.0 24.0   BUN mg/dL 11 12   CREATININE mg/dL 0.64 0.76   GLUCOSE mg/dL 107* 121*   CALCIUM mg/dL 8.9 9.0     Results from last 7 days   Lab Units 11/20/20  0318 11/19/20  2336 11/19/20 2053   TROPONIN T ng/mL <0.010 <0.010 <0.010     Results from last 7 days   Lab Units 11/20/20 0318 11/19/20 2053   WBC 10*3/mm3 10.59 9.81   HEMOGLOBIN g/dL 10.9* 11.8*   HEMATOCRIT % 35.0 37.3   PLATELETS 10*3/mm3 261 277     Results from last 7 days   Lab Units 11/20/20  0318   MAGNESIUM mg/dL 2.5     Results from last 7 days   Lab Units 11/20/20 0318   CHOLESTEROL mg/dL 188   TRIGLYCERIDES mg/dL 105   HDL CHOL mg/dL 70*   LDL CHOL mg/dL 99     Results from last 7 days   Lab Units 11/20/20 0318   TSH uIU/mL 1.350     Results from last 7 days   Lab Units 11/20/20 0318   HEMOGLOBIN A1C % 5.60     Results from last 7 days   Lab Units 11/19/20 2053   PROBNP pg/mL 115.1           I personally reviewed the patient's EKG/Telemetry data    Radiology Data:   CXR 11/19/20:  IMPRESSION:  No acute cardiopulmonary findings.    Current Medications:    [START ON 11/21/2020] aspirin, 81 mg, Oral,  "Daily  fluvoxaMINE, 100 mg, Oral, Nightly  sodium chloride, 10 mL, Intravenous, Q12H      nitroglycerin, 5-200 mcg/min, Last Rate: Stopped (11/19/20 1625)        Assessment and Plan:     1. Chest pain  - symptoms concerning for unstable angina   - troponins negative x3, EKGs without acute ischemic changes  - recent echo in March with normal LVEF   - proceed with C +/- CBI today per Dr. Mcdermott. Risks, benefits and alternatives to the procedure explained to the patient and she understands and wishes to proceed.  -  I cannot locate \"strips\" from EMS from last night. EKGs here are normal.    2. PAF, s/p ablation   - Eliquis and metoprolol discontinued in September      Thank you for allowing me to participate in the care of Sumi Garay. Feel free to contact me directly with any further questions or concerns.    Scribed for Sulaiman Mcdermott MD by Betty Carroll PA-C. 11/20/2020  11:18 EST    "

## 2020-11-20 NOTE — PROGRESS NOTES
[facility] Medicine Services  Clinical Decision Unit  PROGRESS NOTE    Patient Name: Sumi Garay  : 1970  MRN: [id number]    Admission Date and Time: 2020  8:38 PM  Primary Care Physician: Tyrel Mehta DO    Subjective   Subjective     CC:  Chest pain     HPI:  Patient is resting in bed in NAD with family at bedside. She states she felt better this am. Chest pain has resolved. No acute events overnight per nursing. Plan for heart cath today.     Review of Systems  Gen- No fevers, chills  CV- No chest pain, palpitations  Resp- No cough, dyspnea  GI- No N/V/D, abd pain        Objective   Objective     Vital Signs:   Temp:  [98 °F (36.7 °C)-98.3 °F (36.8 °C)] 98.3 °F (36.8 °C)  Heart Rate:  [] 87  Resp:  [14-16] 16  BP: ()/(52-79) 104/52        Physical Exam:  Constitutional: No acute distress, awake, alert  HENT: NCAT, mucous membranes moist  Respiratory: Clear to auscultation bilaterally, respiratory effort normal room air 98%  Cardiovascular: RRR, no murmurs, rubs, or gallops, palpable pedal pulses bilaterally  Gastrointestinal: Positive bowel sounds, soft, nontender, nondistended  Musculoskeletal: No bilateral ankle edema  Psychiatric: Appropriate affect, cooperative  Neurologic: Oriented x 3, strength symmetric in all extremities, Cranial Nerves grossly intact to confrontation, speech clear  Skin: No rashes      Results Reviewed:  Lab Results   Lab Value Date/Time    TROPONINT <0.010 2020 0318    TROPONINT <0.010 2020 2336    TROPONINT <0.010 2020    TROPONINT <0.010 04/15/2020 1019    TROPONINT <0.010 04/15/2020 0635    TROPONINT <0.010 04/15/2020 0112    TROPONINT <0.010 2020    TROPONINT <0.010 2020 0405    TROPONINT <0.010 2020 2200     Basic Metabolic Panel    Sodium Sodium   Date Value Ref Range Status   2020 140 136 - 145 mmol/L Final   2020 139 136 - 145 mmol/L Final      Potassium  Potassium   Date Value Ref Range Status   11/20/2020 4.0 3.5 - 5.2 mmol/L Final   11/19/2020 3.8 3.5 - 5.2 mmol/L Final      Chloride Chloride   Date Value Ref Range Status   11/20/2020 105 98 - 107 mmol/L Final   11/19/2020 104 98 - 107 mmol/L Final      Bicarbonate No results found for: PLASMABICARB   BUN BUN   Date Value Ref Range Status   11/20/2020 11 6 - 20 mg/dL Final   11/19/2020 12 6 - 20 mg/dL Final      Creatinine Creatinine   Date Value Ref Range Status   11/20/2020 0.64 0.57 - 1.00 mg/dL Final   11/19/2020 0.76 0.57 - 1.00 mg/dL Final      Calcium Calcium   Date Value Ref Range Status   11/20/2020 8.9 8.6 - 10.5 mg/dL Final   11/19/2020 9.0 8.6 - 10.5 mg/dL Final      Glucose      No components found for: GLUCOSE.*         Assessment/Plan   Assessment / Plan     Active Hospital Problems    Diagnosis  POA   • **Precordial chest pain [R07.2]  Yes   • Hypomagnesemia [E83.42]  Yes   • Paroxysmal atrial fibrillation (CMS/HCC) [I48.0]  Yes      Resolved Hospital Problems   No resolved problems to display.        Brief course to date:  Sumi Garay is a 50 y.o. female who presented with PMH significant for afib (s/p ablation), who presents to the ED with complaint of chest pain. She states that she has been having chest discomfort for the past 3 days that acutely increased today just PTA with associated nausea. The pain started mid chest and radiated to left shoulder and she rated the pain 10/10. Had associated diaphoresis. NTG sublingual and paste were given en route to the hospital per EMS and she had improved pain.   EMS reported ST depression on EKG and concern for STEMI. Upon arrival to the ED, EKG was reviewed and compared with new EKG and it was determined there was no concern for STEMI. Her initial troponin is negative. CXR is negative for acute findings. She will be admitted to Hospital Medicine for further evaluation.     Plan:  Chest pain   - EKG without acute ischemic changes   - troponin  negative  X 3  -cardiology follows and recommends a heart cath today  -ASA given PTA per EMS  -Lovenox load given in ED  -Suarez negative   -- cp resolved this am      Hypomagnesemia  -electrolyte replacement per protocol      PAF  -ablation per Dr Garcia 06/03/20  -off anticoagulation   -off rate control   -no further problems since ablation     DVT Prophylaxis:  Had lovenox load in ED    Discharge readiness criteria:   1. Chest pain free  2. Cleared by cardiology     Leah Bee, APRN  11/20/20

## 2020-11-20 NOTE — PROGRESS NOTES
Continued Stay Note  [facility]     Patient Name: Sumi Garay  MRN: [id number]  Today's Date: 11/20/2020    Admit Date: 11/19/2020    Discharge Plan     Row Name 11/20/20 1033       Plan    Plan  Home at DC    Patient/Family in Agreement with Plan  other (see comments)    Plan Comments  No DC needs identified at this time.    Final Discharge Disposition Code  01 - home or self-care    Row Name 11/20/20 0824       Plan    Final Discharge Disposition Code  01 - home or self-care        Discharge Codes    No documentation.       Expected Discharge Date and Time     Expected Discharge Date Expected Discharge Time    Nov 20, 2020             Lis Lazcano RN

## 2020-11-20 NOTE — ED PROVIDER NOTES
EMERGENCY DEPARTMENT ENCOUNTER    Room Number:  16/16  Date of encounter:  11/19/2020  PCP: Tyrel Mehta DO  Historian: Patient and paramedics      HPI:  Chief Complaint: Chest pain        Context: Sumi Garay is a 50 y.o. female who presents to the ED c/o chest pain significantly increasing in severity over the last 45 minutes prior to arrival in the emergency department.  Her discomfort started 3 days ago.  She notes no aggravating factors to include deep inspiration and movement.  Discomfort radiates from her sternal region into her left shoulder.  Her discomfort was rated 10 out of 10 prior to EMS arrival.  They administered sublingual nitroglycerin and placed Nitropaste on the chest wall.  Her discomfort is now down to 5 out of 10 on the pain scale.  She had no diaphoresis but did complain of mild nausea.  Paramedics report significant ST depression which they felt reflected a STEMI.  STEMI alert was called initially to our facility.      PAST MEDICAL HISTORY  Active Ambulatory Problems     Diagnosis Date Noted   • Atrial fibrillation with RVR  03/16/2020   • Hypothyroidism 03/16/2020   • Hypokalemia 03/16/2020   • Uterine leiomyoma 03/16/2020   • Basal cell carcinoma 03/16/2020   • Nonsustained ventricular tachycardia versus aberrantly conducted atrial fibrillation 03/17/2020   • Hypomagnesemia 04/15/2020   • Leukocytosis 04/15/2020   • Paroxysmal atrial fibrillation (CMS/HCC) 05/18/2020     Resolved Ambulatory Problems     Diagnosis Date Noted   • No Resolved Ambulatory Problems     Past Medical History:   Diagnosis Date   • A-fib (CMS/HCC)          PAST SURGICAL HISTORY  Past Surgical History:   Procedure Laterality Date   • CARDIAC ELECTROPHYSIOLOGY PROCEDURE N/A 6/2/2020    Procedure: PVA (paroxysmal), no meds to hold, Precision (SJM), ASAP  ;  Surgeon: Thad Gonzalez DO;  Location: [facility] INVASIVE LOCATION;  Service: Cardiovascular;  Laterality: N/A;   • MANDIBULAR AND MAXILLARY OSTEOTOMY   1993    FOR tmj    • RHINOPLASTY  2004         FAMILY HISTORY  Family History   Problem Relation Age of Onset   • Atrial fibrillation Mother    • Heart failure Mother    • Atrial fibrillation Father          SOCIAL HISTORY  Social History     Socioeconomic History   • Marital status:      Spouse name: Not on file   • Number of children: Not on file   • Years of education: Not on file   • Highest education level: Not on file   Tobacco Use   • Smoking status: Never Smoker   • Smokeless tobacco: Never Used   Substance and Sexual Activity   • Alcohol use: Yes     Alcohol/week: 1.0 - 2.0 standard drinks     Types: 1 - 2 Glasses of wine per week     Comment: ocass   • Drug use: Never   • Sexual activity: Defer   Social History Narrative    caffeine use: 1 serving of coffee daily         ALLERGIES  Penicillins and Percocet [oxycodone-acetaminophen]        REVIEW OF SYSTEMS  Review of Systems     All systems reviewed and negative except for those discussed in HPI.       PHYSICAL EXAM    I have reviewed the triage vital signs and nursing notes.    ED Triage Vitals   Temp Heart Rate Resp BP SpO2   11/19/20 2050 11/19/20 2047 11/19/20 2047 11/19/20 2047 11/19/20 2047   98 °F (36.7 °C) 103 16 138/79 99 %      Temp src Heart Rate Source Patient Position BP Location FiO2 (%)   11/19/20 2050 11/19/20 2047 11/19/20 2047 11/19/20 2047 --   Oral Monitor Lying Right arm        Physical Exam  GENERAL:   Appears anxious but nontoxic she is wheeled in by paramedics..  HENT: Nares patent.  EYES: No scleral icterus.  CV: Regular rhythm, regular rate.  No murmurs gallops rubs.  Clear to auscultation.  RESPIRATORY: Normal effort.  No audible wheezes, rales or rhonchi.  ABDOMEN: Soft, nontender.  MUSCULOSKELETAL: No deformities.  No reproduction of chest discomfort with palpation.    NEURO: Alert, moves all extremities, follows commands.  SKIN: Warm, dry, no rash visualized.        LAB RESULTS  Recent Results (from the past 24 hour(s))    ECG 12 Lead    Collection Time: 11/19/20  8:41 PM   Result Value Ref Range    QT Interval 340 ms    QTC Interval 453 ms   COVID-19, ABBOTT IN-HOUSE,NP Swab (NO TRANSPORT MEDIA) 2 HR TAT - Swab, Nasopharynx    Collection Time: 11/19/20  8:53 PM    Specimen: Nasopharynx; Swab   Result Value Ref Range    COVID19 Not Detected Not Detected - Ref. Range   Troponin    Collection Time: 11/19/20  8:53 PM    Specimen: Blood   Result Value Ref Range    Troponin T <0.010 0.000 - 0.030 ng/mL   Comprehensive Metabolic Panel    Collection Time: 11/19/20  8:53 PM    Specimen: Blood   Result Value Ref Range    Glucose 121 (H) 65 - 99 mg/dL    BUN 12 6 - 20 mg/dL    Creatinine 0.76 0.57 - 1.00 mg/dL    Sodium 139 136 - 145 mmol/L    Potassium 3.8 3.5 - 5.2 mmol/L    Chloride 104 98 - 107 mmol/L    CO2 24.0 22.0 - 29.0 mmol/L    Calcium 9.0 8.6 - 10.5 mg/dL    Total Protein 6.6 6.0 - 8.5 g/dL    Albumin 4.20 3.50 - 5.20 g/dL    ALT (SGPT) 10 1 - 33 U/L    AST (SGOT) 13 1 - 32 U/L    Alkaline Phosphatase 74 39 - 117 U/L    Total Bilirubin 0.2 0.0 - 1.2 mg/dL    eGFR Non African Amer 81 >60 mL/min/1.73    Globulin 2.4 gm/dL    A/G Ratio 1.8 g/dL    BUN/Creatinine Ratio 15.8 7.0 - 25.0    Anion Gap 11.0 5.0 - 15.0 mmol/L   Lipase    Collection Time: 11/19/20  8:53 PM    Specimen: Blood   Result Value Ref Range    Lipase 58 13 - 60 U/L   BNP    Collection Time: 11/19/20  8:53 PM    Specimen: Blood   Result Value Ref Range    proBNP 115.1 0.0 - 450.0 pg/mL   Light Blue Top    Collection Time: 11/19/20  8:53 PM   Result Value Ref Range    Extra Tube hold for add-on    Green Top (Gel)    Collection Time: 11/19/20  8:53 PM   Result Value Ref Range    Extra Tube Hold for add-ons.    Lavender Top    Collection Time: 11/19/20  8:53 PM   Result Value Ref Range    Extra Tube hold for add-on    Gold Top - SST    Collection Time: 11/19/20  8:53 PM   Result Value Ref Range    Extra Tube Hold for add-ons.    CBC Auto Differential    Collection Time:  11/19/20  8:53 PM    Specimen: Blood   Result Value Ref Range    WBC 9.81 3.40 - 10.80 10*3/mm3    RBC 4.37 3.77 - 5.28 10*6/mm3    Hemoglobin 11.8 (L) 12.0 - 15.9 g/dL    Hematocrit 37.3 34.0 - 46.6 %    MCV 85.4 79.0 - 97.0 fL    MCH 27.0 26.6 - 33.0 pg    MCHC 31.6 31.5 - 35.7 g/dL    RDW 12.9 12.3 - 15.4 %    RDW-SD 40.4 37.0 - 54.0 fl    MPV 10.5 6.0 - 12.0 fL    Platelets 277 140 - 450 10*3/mm3    Neutrophil % 71.0 42.7 - 76.0 %    Lymphocyte % 21.7 19.6 - 45.3 %    Monocyte % 5.5 5.0 - 12.0 %    Eosinophil % 1.0 0.3 - 6.2 %    Basophil % 0.5 0.0 - 1.5 %    Immature Grans % 0.3 0.0 - 0.5 %    Neutrophils, Absolute 6.96 1.70 - 7.00 10*3/mm3    Lymphocytes, Absolute 2.13 0.70 - 3.10 10*3/mm3    Monocytes, Absolute 0.54 0.10 - 0.90 10*3/mm3    Eosinophils, Absolute 0.10 0.00 - 0.40 10*3/mm3    Basophils, Absolute 0.05 0.00 - 0.20 10*3/mm3    Immature Grans, Absolute 0.03 0.00 - 0.05 10*3/mm3    nRBC 0.0 0.0 - 0.2 /100 WBC       If labs were ordered, I independently reviewed the results.        RADIOLOGY  Xr Chest 1 View    Result Date: 11/19/2020  CR Chest 1 Vw INDICATION: Chest pain and dizziness beginning earlier today COMPARISON:  4/14/2020 FINDINGS: Single portable AP view(s) of the chest.  The heart and mediastinal contours are normal. The lungs are clear. No pneumothorax or pleural effusion.     No acute cardiopulmonary findings. Signer Name: Malik Ovalles MD  Signed: 11/19/2020 9:35 PM  Workstation Name: [id number]  [facility]      I ordered and reviewed the above noted radiographic studies.    I viewed images of chest x-ray which showed no active disease per my independent interpretation.  See radiologist's dictation for official interpretation.        PROCEDURES    Procedures    ECG 12 Lead   Final Result   Test Reason : STEMI PER EMS   Blood Pressure : **/** mmHG   Vent. Rate : 107 BPM     Atrial Rate : 107 BPM      P-R Int : 136 ms          QRS Dur : 088 ms       QT Int : 340  ms       P-R-T Axes : 070 064 036 degrees      QTc Int : 453 ms      Sinus tachycardia   Otherwise normal ECG   When compared with ECG of 02-JUL-2020 10:15,   No significant change was found   Confirmed by LUIS MIGUEL GODFREY MD ([id number]) on 11/19/2020 10:48:44 PM      Referred By:  EDMD           Confirmed By:LUIS MIGUEL GODFREY MD          MEDICATIONS GIVEN IN ER    Medications   sodium chloride 0.9 % flush 10 mL (has no administration in time range)   nitroglycerin (NITROSTAT) SL tablet 0.4 mg (has no administration in time range)   nitroglycerin (TRIDIL) 200 mcg/ml infusion (has no administration in time range)   enoxaparin (LOVENOX) syringe 80 mg (80 mg Subcutaneous Given 11/19/20 2241)         PROGRESS, DATA ANALYSIS, CONSULTS, AND MEDICAL DECISION MAKING    All labs have been independently reviewed by me.  All radiology studies have been reviewed by me and the radiologist dictating the report.   EKG's have been independently viewed and interpreted by me.      Differential diagnoses: Acute coronary syndrome versus GERD versus costochondritis, etc.      ED Course as of Nov 19 2256   Thu Nov 19, 2020 2246 I attended to the patient immediately upon arrival.    I evaluated the prehospital EKG as well as our first EKG in the emergency department.  I see no evidence of STEMI.  I counseled the cath team from coming in and canceled the cardiologist from coming to the hospital.    I ordered sublingual and IV drip nitroglycerin.  Prior to either being initiated the patient's discomfort reduced to 1 out of 10.  She appears in stable status at this point but her blood pressure is on the borderline side so we will not administer nitroglycerin other than the paste that is already on her chest wall done by paramedics.  She has received aspirin prehospital.  We have administered Lovenox I subcu.  I paged the hospitalist for admission.    [MS]   2253 I spoke with flaco Mg.  He will admit.    [MS]      ED Course User  Index  [MS] Ian Adams MD             AS OF 22:56 EST VITALS:    BP - 114/72  HR - 99  TEMP - 98 °F (36.7 °C) (Oral)  O2 SATS - 96%        DIAGNOSIS  Final diagnoses:   Precordial chest pain         DISPOSITION  Admission to telemetry           Ian Adams MD  11/19/20 0759

## 2020-11-20 NOTE — PLAN OF CARE
Problem: Adult Inpatient Plan of Care  Goal: Plan of Care Review  Flowsheets (Taken 11/20/2020 6329)  Progress: improving  Plan of Care Reviewed With: patient  Outcome Summary: Pt has no c/o soa, nausea, or pain. Pt remains NSR on the monitor. She is up ad dana and able to ambulate in the bathroom and in room.  Pt has left for heart cath procedure. VSS.  No new concerns at this time.   Goal Outcome Evaluation:  Plan of Care Reviewed With: patient  Progress: improving  Outcome Summary: Pt has no c/o soa, nausea, or pain. Pt remains NSR on the monitor. She is up ad dana and able to ambulate in the bathroom and in room.  Pt has left for heart cath procedure. VSS.  No new concerns at this time.

## 2020-11-20 NOTE — PLAN OF CARE
Goal Outcome Evaluation:  Plan of Care Reviewed With: patient  Progress: improving  Outcome Summary: VSS on RA.  Patient remains in NSR on monitor.  Infrequent PACs noted.  Patient remains free of pain.  Denies any chest pain, SOA, diaphoresis or nausea.  Magnesium replaced. Redraw pending.  Will continue to monitor progress.  Plan for cardiology consult today for possible stress test vs cardiac cath.

## 2020-11-20 NOTE — H&P
[facility] Medicine Services  Clinical Decision Unit (CDU)  HISTORY & PHYSICAL    Patient Name: Sumi Garay  : 1970  MRN: [id number]  Primary Care Physician: Tyrel Mehta DO  Date of admission: 2020  8:38 PM      Subjective   Subjective     Chief Complaint:  Chest pain     HPI:  Sumi Garay is a 50 y.o. female with PMH significant for afib (s/p ablation), who presents to the ED with complaint of chest pain.   She states that she has been having chest discomfort for the past 3 days that acutely increased today just PTA with associated nausea. The pain started mid chest and radiated to left shoulder and she rated the pain 10/10. Had associated diaphoresis. NTG sublingual and paste were given en route to the hospital per EMS and she had improved pain.   EMS reported ST depression on EKG and concern for STEMI. Upon arrival to the ED, EKG was reviewed and compared with new EKG and it was determined there was no concern for STEMI. She denies diaphoresis, Fever, SOB, cough, known COVID exposure, loss of sense of taste or smell.    Her initial troponin is negative. CXR is negative for acute findings. She will be admitted to Hospital Medicine for further evaluation.     Review of Systems   Constitutional: Negative for activity change, appetite change, chills, diaphoresis, fatigue and fever.   HENT: Negative.    Eyes: Negative for visual disturbance.   Respiratory: Negative for cough, chest tightness, shortness of breath and wheezing.    Cardiovascular: Negative for chest pain, palpitations and leg swelling.   Gastrointestinal: Positive for nausea. Negative for abdominal distention, abdominal pain, constipation, diarrhea and vomiting.   Genitourinary: Negative for difficulty urinating, dysuria, frequency and urgency.   Musculoskeletal: Negative for arthralgias, gait problem and myalgias.   Skin: Negative for color change, pallor and rash.   Neurological: Negative  for dizziness, weakness, light-headedness and headaches.   Psychiatric/Behavioral: Negative for confusion. The patient is not nervous/anxious.         All other systems reviewed and negative    Personal History     Past Medical History:   Diagnosis Date   • A-fib (CMS/HCC)    • Basal cell carcinoma 3/16/2020   • Uterine leiomyoma 3/16/2020       Past Surgical History:   Procedure Laterality Date   • CARDIAC ELECTROPHYSIOLOGY PROCEDURE N/A 6/2/2020    Procedure: PVA (paroxysmal), no meds to hold, Precision (SJM), ASAP  ;  Surgeon: Thad Gonzalez DO;  Location: [facility] INVASIVE LOCATION;  Service: Cardiovascular;  Laterality: N/A;   • MANDIBULAR AND MAXILLARY OSTEOTOMY  1993    FOR tmj    • RHINOPLASTY  2004       Family History: family history includes Atrial fibrillation in her father and mother; Heart failure in her mother. Otherwise pertinent FHx was reviewed and unremarkable.     Social History:  reports that she has never smoked. She has never used smokeless tobacco. She reports current alcohol use of about 1.0 - 2.0 standard drinks of alcohol per week. She reports that she does not use drugs.  Social History     Social History Narrative    caffeine use: 1 serving of coffee daily       Medications:  Available home medication information reviewed.  (Not in a hospital admission)      Allergies   Allergen Reactions   • Penicillins Hives and Rash   • Percocet [Oxycodone-Acetaminophen] Itching       Objective   Objective     Vital Signs:   Temp:  [98 °F (36.7 °C)] 98 °F (36.7 °C)  Heart Rate:  [] 89  Resp:  [16] 16  BP: ()/(69-79) 99/69        Physical Exam  Vitals signs reviewed.   Constitutional:       Appearance: Normal appearance.   HENT:      Head: Normocephalic and atraumatic.      Nose: Nose normal.   Eyes:      Pupils: Pupils are equal, round, and reactive to light.   Neck:      Musculoskeletal: Normal range of motion and neck supple.   Cardiovascular:      Rate and Rhythm: Normal rate and  regular rhythm.      Pulses: Normal pulses.      Heart sounds: Normal heart sounds. No murmur. No gallop.    Pulmonary:      Effort: Pulmonary effort is normal. No respiratory distress.      Breath sounds: Normal breath sounds. No wheezing or rales.   Abdominal:      General: Bowel sounds are normal. There is no distension.      Palpations: Abdomen is soft.      Tenderness: There is no abdominal tenderness. There is no guarding.   Musculoskeletal: Normal range of motion.         General: No swelling or tenderness.   Skin:     General: Skin is warm and dry.   Neurological:      General: No focal deficit present.      Mental Status: She is alert and oriented to person, place, and time.   Psychiatric:         Mood and Affect: Mood normal.         Thought Content: Thought content normal.         Judgment: Judgment normal.          Results Reviewed:  Xr Chest 1 View    Result Date: 11/19/2020  No acute cardiopulmonary findings. Signer Name: Malik Ovalles MD  Signed: 11/19/2020 9:35 PM  Workstation Name: [id number]  [facility]      ECG 12 Lead   Final Result   Test Reason : STEMI PER EMS   Blood Pressure : **/** mmHG   Vent. Rate : 107 BPM     Atrial Rate : 107 BPM      P-R Int : 136 ms          QRS Dur : 088 ms       QT Int : 340 ms       P-R-T Axes : 070 064 036 degrees      QTc Int : 453 ms      Sinus tachycardia   Otherwise normal ECG   When compared with ECG of 02-JUL-2020 10:15,   No significant change was found   Confirmed by LUIS MIGUEL GODFREY MD ([id number]) on 11/19/2020 10:48:44 PM      Referred By:  EDMD           Confirmed By:LUIS MIGUEL GODFREY MD        Assessment/Plan   Assessment / Plan     Active Hospital Problems    Diagnosis POA   • **Precordial chest pain [R07.2] Yes   • Hypomagnesemia [E83.42] Yes   • Paroxysmal atrial fibrillation (CMS/HCC) [I48.0] Yes     Added automatically from request for surgery [id number]       50 year old female with PMH significant for PAF s/p ablation who presents to  the ED with complaint of chest pain.     Plan:  Chest pain   -trend EKG  -trend troponin  -Cardiology consult in am, follows outpt with [facility]  -will defer to cardiology for plan of stress test vs cardiac cath   -ASA given PTA per EMS  -Lovenox load given in ED  -CBC, BMP, TSH, Lipid panel, HgA1c in am   -Abbott negative     Hypomagnesemia  -electrolyte replacement  -BMP and mg+ in am     PAF  -ablation per Dr Garcia 06/03/20  -off anticoagulation   -off rate control   -no further problems since ablation       CODE STATUS:    Code Status and Medical Interventions:   Ordered at: 11/19/20 1840     Code Status:    CPR     Medical Interventions (Level of Support Prior to Arrest):    Full       Admission Status:   I believe this patient meets OBSERVATION status, however if further evaluation or treatment plans warrant, status may change.  Based upon current information, I predict patient's care encounter to be less than or equal to 2 midnights.      Discharge Blueprint (criteria for discharge):   1. Chest pain free  2. Cleared per cardiology     Jillian Ospina, APRN  11/19/20    Patient seen and billed independently by [facility]

## 2020-11-23 ENCOUNTER — DOCUMENTATION (OUTPATIENT)
Dept: CARDIAC REHAB | Facility: HOSPITAL | Age: 50
End: 2020-11-23

## 2021-03-04 ENCOUNTER — OFFICE VISIT (OUTPATIENT)
Dept: CARDIOLOGY | Facility: CLINIC | Age: 51
End: 2021-03-04

## 2021-03-04 VITALS
OXYGEN SATURATION: 97 % | HEIGHT: 70 IN | HEART RATE: 96 BPM | WEIGHT: 184 LBS | BODY MASS INDEX: 26.34 KG/M2 | SYSTOLIC BLOOD PRESSURE: 123 MMHG | DIASTOLIC BLOOD PRESSURE: 64 MMHG

## 2021-03-04 DIAGNOSIS — R00.0 SINUS TACHYCARDIA: ICD-10-CM

## 2021-03-04 DIAGNOSIS — R55 NEAR SYNCOPE: Primary | ICD-10-CM

## 2021-03-04 DIAGNOSIS — R00.2 PALPITATIONS: ICD-10-CM

## 2021-03-04 PROCEDURE — 99214 OFFICE O/P EST MOD 30 MIN: CPT | Performed by: INTERNAL MEDICINE

## 2021-03-04 PROCEDURE — 93000 ELECTROCARDIOGRAM COMPLETE: CPT | Performed by: INTERNAL MEDICINE

## 2021-03-04 RX ORDER — MULTIPLE VITAMINS W/ MINERALS TAB 9MG-400MCG
1 TAB ORAL DAILY
COMMUNITY

## 2021-03-04 NOTE — PROGRESS NOTES
"  OFFICE FOLLOW UP     Date of Encounter:2021     Name: Sumi Garay  : 1970  [place] [address] [place] [address]    PCP: Tyrel Mehta, DO  [address] [place] [address]    Sumi Garay is a 50 y.o. female.      Chief Complaint: tachycardia - takes breath away, near syncope    Problem List:   1. Chest pain   a. [facility] admission with chest pain, 2020   b. LHC 20: Normal LV gram and normal coronaries   c. Echo 20: limited study with Lumason reveals NORMAL global and segmental wall motion and LVEF 67%  2. Paroxysmal atrial fibrillation   a. occurred in setting of use of Armor Thyroid for possible hypothyroidism in past.   b. Failed ECV x 2, Flecainide and Rythmol   c. Echo 2020 with EF 60%   d. CHADSVASC= 1  e. S/p PVA 2020, Dr. Gonzalez  f. Recurrent tachypalpitations with presyncope, winter 9527-3094.  3. Anxiety     Allergies:  Allergies   Allergen Reactions   • Penicillins Hives and Rash   • Percocet [Oxycodone-Acetaminophen] Itching     Current Medications:  •  doxepin (SINEquan) 10 MG capsule, Take 10 mg by mouth Daily As Needed  •  fluvoxaMINE (LUVOX) 50 MG tablet, 100 mg Every Night.  •  metoprolol succinate XL (TOPROL-XL) 50 MG 24 hr tablet, taking differently: Take 25 mg by mouth Daily.  •  multivitamin with minerals tablet tablet, Take 1 tablet by mouth Daily    History of Present Illness:      Sumi Garay returns for follow up after normal heart cath last November. She was sitting in the waiting room today and experienced a sensation that her heart \"stopped\" and then began to race. She was brought back to an exam room and EKG performed. This episode lasted about 10 minutes. She was lightheaded and afraid to stand. She has had these episodes 10-12 times over the last several months. She reports this occurred while sitting at her desk yesterday. She feels tachycardia takes her breath away. She denies saeid syncope. She " "decreased metoprolol from 50 mg to 25 mg due to excessive fatigue. She denies chest pain. She currently has a torn rotator cuff and may require surgery.  She thinks that symptoms are distinct from those noted with paroxysmal atrial fibrillation prior to her ablation.    The following portions of the patient's history were reviewed and updated as appropriate: allergies, current medications and problem list.    ROS: Pertinent positives as listed in the HPI.  All other systems reviewed and negative.    Objective:    Vitals:    03/04/21 1502 03/04/21 1540   BP: 179/98 123/64   BP Location: Left arm Right arm   Patient Position: Sitting Sitting   Pulse: 118 96   SpO2: 97%    Weight: 83.5 kg (184 lb)    Height: 177.8 cm (70\")      Body mass index is 26.4 kg/m².    Physical Exam:  GENERAL: Alert, cooperative, in no acute distress.   HEENT: Normocephalic, no adenopathy, no jugular venous distention  HEART: No discrete PMI is noted. Regular rhythm, normal rate, and no murmurs, gallops, or rubs.   LUNGS: Clear to auscultation bilaterally. No wheezing, rales or ronchi.  ABDOMEN: Soft, bowel sounds present, non-tender   NEUROLOGIC: No focal abnormalities involving strength or sensation are noted.   EXTREMITIES: No clubbing, cyanosis, or edema noted.      Diagnostic Data:  No new labs available to review.       ECG 12 Lead    Date/Time: 3/4/2021 4:07 PM  Performed by: Sulaiman Mcdermott MD  Authorized by: Sulaiman Mcdermott MD   Comparison: compared with previous ECG from 11/20/2020  Similar to previous ECG  Comparison to previous ECG: Rate higher today  Rhythm: sinus tachycardia  Rate: tachycardic  BPM: 103  Clinical impression comment: otherwise normal EKG               Advance Care Planning   ACP discussion was held with the patient during this visit. Patient does not have an advance directive, declines further assistance.    Assessment and Plan:   1 Tachycardia, near syncope: EKG revealed sinus tachycardia. We will place a 30 " day event monitor today and forward results to Dr. Gonzalez. She will also purchase a Kardia device and notify our office if she would like monitor strips reviewed.   2.  Initial elevated blood pressure this visit: Likely related to anxiety over palpitations as the pressure was normal at the time of the patient's dismissal from the office.    Scribed for Sulaiman Mcdermott MD by Sanna Bailey RN. 03/04/2021 16:09 EST.       EMR Dragon/Transcription Disclaimer:  Much of this encounter note is an electronic transcription/translation of spoken language to printed text.  The electronic translation of spoken language may permit erroneous, or at times, nonsensical words or phrases to be inadvertently transcribed.  Although I have reviewed the note for such errors, some may still exist.

## 2021-09-07 ENCOUNTER — OFFICE VISIT (OUTPATIENT)
Dept: CARDIOLOGY | Facility: CLINIC | Age: 51
End: 2021-09-07

## 2021-09-07 VITALS
DIASTOLIC BLOOD PRESSURE: 68 MMHG | OXYGEN SATURATION: 97 % | SYSTOLIC BLOOD PRESSURE: 100 MMHG | HEART RATE: 85 BPM | HEIGHT: 70 IN | WEIGHT: 186 LBS | BODY MASS INDEX: 26.63 KG/M2

## 2021-09-07 DIAGNOSIS — I48.0 PAROXYSMAL ATRIAL FIBRILLATION (HCC): Primary | ICD-10-CM

## 2021-09-07 DIAGNOSIS — R00.2 PALPITATIONS: ICD-10-CM

## 2021-09-07 PROCEDURE — 99214 OFFICE O/P EST MOD 30 MIN: CPT | Performed by: INTERNAL MEDICINE

## 2021-09-07 NOTE — PROGRESS NOTES
OFFICE FOLLOW UP     Date of Encounter:2021     Name: Sumi Garay  : 1970  Address: [address] [place] [address]    PCP: Tyrel Mehta, DO  [address] [place] [address]    Sumi Garay is a 50 y.o. female.    Chief Complaint: Follow up of chest pain, palpitations    Problem List:   1. Chest pain   a. [facility] admission with chest pain, 2020   b. LHC 20: Normal LV gram and normal coronaries   c. Echo 20: limited study with Lumason reveals NORMAL global and segmental wall motion and LVEF 67%  2. Paroxysmal atrial fibrillation   a. occurred in setting of use of Armor Thyroid for possible hypothyroidism in past.   b. Failed ECV x 2, Flecainide and Rythmol   c. Echo 2020 with EF 60%   d. CHADSVASC= 1  e. S/p PVA 2020, Dr. Gonzalez  f. Recurrent tachypalpitations with presyncope, winter 7805-0139.  g. 30 day event 2021: short runs of atrial tachycardia   3. Anxiety     Allergies:  Allergies   Allergen Reactions   • Penicillins Hives and Rash   • Percocet [Oxycodone-Acetaminophen] Itching       Current Medications:  Current Outpatient Medications   Medication Instructions   • doxepin (SINEQUAN) 10 mg, Oral, Daily PRN   • fluvoxaMINE (LUVOX) 200 mg, Nightly   • metoprolol succinate XL (TOPROL-XL) 50 mg, Oral, Daily   • multivitamin with minerals tablet tablet 1 tablet, Oral, Daily        History of Present Illness:           Mrs. Garay returns for follow up today. She reports continued brief palpitations, however these have improved. They are exacerbated by drinking any alcohol and with stress. She denies any recurrent chest pain, no syncope or dizziness. She has had her Covid vaccines and even received her booster.     The following portions of the patient's history were reviewed and updated as appropriate: allergies, current medications and problem list.    ROS: Pertinent positives as listed in the HPI.  All other systems reviewed  "and negative.    Objective:  Vitals:    09/07/21 1554 09/07/21 1555   BP: 120/71 100/68   BP Location: Right arm Right arm   Patient Position: Sitting Standing   Pulse: 79 85   SpO2: 97%    Weight: 84.4 kg (186 lb)    Height: 177.8 cm (70\")      Body mass index is 26.69 kg/m².    Physical Exam:  GENERAL: Alert, cooperative, in no acute distress.   HEENT: Normocephalic, no adenopathy, no jugular venous distention  HEART: No discrete PMI is noted. Regular rhythm, normal rate, and no murmurs, gallops, or rubs.   LUNGS: Clear to auscultation bilaterally. No wheezing, rales or ronchi.  ABDOMEN: Soft, bowel sounds present, non-tender   NEUROLOGIC: No focal abnormalities involving strength or sensation are noted.   EXTREMITIES: No clubbing, cyanosis, or edema noted.     Diagnostic Data:      Procedures    Assessment and Plan:     1. Palpitations: improved, and brief in nature. 30 day event monitor in March showed just brief episodes of atrial tachycardia. Continue BB and she has a follow up scheduled with Dr. Gonzalez later this month.   2. We will see her back as needed, and maintain follow up with Dr. Gonzalez.      Scribed for Sulaiman Mcdermott MD by Betty Carroll PA-C. 9/7/2021  16:34 EDT               "

## 2021-12-06 RX ORDER — METOPROLOL SUCCINATE 50 MG/1
TABLET, EXTENDED RELEASE ORAL
Qty: 90 TABLET | Refills: 2 | Status: SHIPPED | OUTPATIENT
Start: 2021-12-06 | End: 2023-01-23

## 2023-01-23 ENCOUNTER — OFFICE VISIT (OUTPATIENT)
Dept: CARDIOLOGY | Facility: CLINIC | Age: 53
End: 2023-01-23
Payer: COMMERCIAL

## 2023-01-23 VITALS
DIASTOLIC BLOOD PRESSURE: 70 MMHG | SYSTOLIC BLOOD PRESSURE: 110 MMHG | HEART RATE: 85 BPM | WEIGHT: 198 LBS | BODY MASS INDEX: 28.35 KG/M2 | OXYGEN SATURATION: 97 % | HEIGHT: 70 IN

## 2023-01-23 DIAGNOSIS — I48.0 PAROXYSMAL ATRIAL FIBRILLATION: Primary | ICD-10-CM

## 2023-01-23 DIAGNOSIS — R00.2 PALPITATIONS: ICD-10-CM

## 2023-01-23 PROBLEM — E83.42 HYPOMAGNESEMIA: Status: RESOLVED | Noted: 2020-11-19 | Resolved: 2023-01-23

## 2023-01-23 PROBLEM — R55 NEAR SYNCOPE: Status: RESOLVED | Noted: 2021-03-04 | Resolved: 2023-01-23

## 2023-01-23 PROBLEM — F41.9 ANXIETY: Status: ACTIVE | Noted: 2023-01-23

## 2023-01-23 PROBLEM — E83.42 HYPOMAGNESEMIA: Status: RESOLVED | Noted: 2020-04-15 | Resolved: 2023-01-23

## 2023-01-23 PROBLEM — E03.9 HYPOTHYROIDISM: Status: RESOLVED | Noted: 2020-03-16 | Resolved: 2023-01-23

## 2023-01-23 PROBLEM — R00.0 SINUS TACHYCARDIA: Status: RESOLVED | Noted: 2021-03-04 | Resolved: 2023-01-23

## 2023-01-23 PROBLEM — I47.29 NONSUSTAINED VENTRICULAR TACHYCARDIA: Status: RESOLVED | Noted: 2020-03-17 | Resolved: 2023-01-23

## 2023-01-23 PROBLEM — D72.829 LEUKOCYTOSIS: Status: RESOLVED | Noted: 2020-04-15 | Resolved: 2023-01-23

## 2023-01-23 PROCEDURE — 99213 OFFICE O/P EST LOW 20 MIN: CPT | Performed by: INTERNAL MEDICINE

## 2023-01-23 RX ORDER — HYDROXYZINE HYDROCHLORIDE 25 MG/1
25 TABLET, FILM COATED ORAL AS NEEDED
COMMUNITY

## 2023-01-23 RX ORDER — DESONIDE 0.5 MG/G
OINTMENT TOPICAL AS NEEDED
COMMUNITY

## 2023-01-23 RX ORDER — METOPROLOL SUCCINATE 50 MG/1
25 TABLET, EXTENDED RELEASE ORAL DAILY
Qty: 90 TABLET | Refills: 2 | Status: SHIPPED | OUTPATIENT
Start: 2023-01-23

## 2023-01-23 RX ORDER — METOPROLOL SUCCINATE 25 MG/1
TABLET, EXTENDED RELEASE ORAL DAILY
COMMUNITY
End: 2023-01-23 | Stop reason: SDUPTHER

## 2023-01-23 NOTE — LETTER
"January 23, 2023     Tyrel Mehta DO  [address] [place] [address]    Patient: Sumi ALMANZA   YOB: 1970   Date of Visit: 1/23/2023       Dear Dr. Tyson DO:    Thank you for referring Sumi ALMANZA to me for evaluation. Below are the relevant portions of my assessment and plan of care.    If you have questions, please do not hesitate to call me. I look forward to following Sumi along with you.         Sincerely,        Thad Gonzalez DO        CC: No Recipients  Javier Garcia PA  01/23/23 1535  Signed          Encounter Date:01/23/2023     Patient ID: Sumi ALMANZA is a 52 y.o. female.    Tyrel Mehta DO    Chief Complaint: PAF      PROBLEM LIST:  Patient Active Problem List    Diagnosis Date Noted   • Paroxysmal atrial fibrillation (HCC) 05/18/2020     Priority: High     Note Last Updated: 1/23/2023     a. occurred in setting of use of Armor Thyroid for possible hypothyroidism in past.   b. Failed ECV x 2, Flecainide and Rythmol   c. Echo March 2020 with EF 60%   d. CHADSVASC= 1  e. S/p PVA June 2020, Dr. Gonzalez  f. Recurrent tachypalpitations with presyncope, winter 2557-5881.  g. 30 day event March 2021: short runs of atrial tachycardia      • Anxiety 01/23/2023   • Precordial chest pain 11/19/2020     Note Last Updated: 1/23/2023     a. [facility] admission with chest pain, November 2020   b. LHC 11/20/20: Normal LV gram and normal coronaries   c. Echo 11/20/20: limited study with Lumason reveals NORMAL global and segmental wall motion and LVEF 67%     • Uterine leiomyoma 03/16/2020   • Basal cell carcinoma 03/16/2020               History of Present Illness  Patient presents today for follow-up with a history of paroxysmal atrial fibrillation status post pulmonary vein isolation with structurally normal heart.  She returns today with a complaint of \"quivering\" which last for a few seconds and occurs most frequently at night while in bed.  She has no awareness of " "sustained tachyarrhythmias, denies dizziness or syncope.  She does not check her blood pressure regularly at home.  She recently started exercising.    Allergies   Allergen Reactions   • Penicillins Hives and Rash   • Percocet [Oxycodone-Acetaminophen] Itching       Current Outpatient Medications   Medication Instructions   • desonide (DESOWEN) 0.05 % ointment As Needed   • doxepin (SINEQUAN) 10 mg, Oral, Daily PRN   • fluvoxaMINE (LUVOX) 200 mg, Nightly   • hydrOXYzine (ATARAX) 25 mg, Oral, As Needed, For anxiety   • linaclotide (LINZESS) 72 mcg, Oral, Every Morning Before Breakfast   • metoprolol succinate XL (TOPROL-XL) 25 MG 24 hr tablet Daily   • multivitamin with minerals tablet tablet 1 tablet, Oral, Daily       .    Objective:     /70 (BP Location: Right arm, Patient Position: Sitting)   Pulse 85   Ht 177.8 cm (70\")   Wt 89.8 kg (198 lb)   SpO2 97%   BMI 28.41 kg/m²   Body mass index is 28.41 kg/m².     Vitals reviewed.   Constitutional:       Appearance: Well-developed.   Pulmonary:      Effort: Pulmonary effort is normal. No respiratory distress.      Breath sounds: Normal breath sounds. No wheezing. No rales.      Comments: Bases clear  Chest:      Chest wall: Not tender to palpatation.   Cardiovascular:      Normal rate. Regular rhythm.      Murmurs: There is no murmur.      No gallop. No click. No rub.   Pulses:     Intact distal pulses.   Musculoskeletal: Normal range of motion.       Lab Review:                           Procedures              Assessment:      Diagnosis Plan   1. Paroxysmal atrial fibrillation (HCC)   sinus rhythm status post pulmonary vein isolation.  Normotensive with CHADS VASc 1      2. Palpitations  Holter Monitor - 72 Hour Up To 15 Days        Plan:     Stable cardiac status.  Continue current medications.   in 1 year, sooner as needed.  Thank you for allowing us to participate in the care of your patient.     Electronically signed by YEISON Mcconnell, " 01/23/23, 3:35 PM EST.

## 2023-01-23 NOTE — PROGRESS NOTES
"        Encounter Date:01/23/2023      Patient ID: Sumi ALMANZA is a 52 y.o. female.    Tyrel Mehta DO    Chief Complaint: PAF      PROBLEM LIST:  Patient Active Problem List    Diagnosis Date Noted   • Paroxysmal atrial fibrillation (HCC) 05/18/2020     Priority: High     Note Last Updated: 1/23/2023     a. occurred in setting of use of Armor Thyroid for possible hypothyroidism in past.   b. Failed ECV x 2, Flecainide and Rythmol   c. Echo March 2020 with EF 60%   d. CHADSVASC= 1  e. S/p PVA June 2020, Dr. Gonzalez  f. Recurrent tachypalpitations with presyncope, winter 1928-7050.  g. 30 day event March 2021: short runs of atrial tachycardia      • Anxiety 01/23/2023   • Precordial chest pain 11/19/2020     Note Last Updated: 1/23/2023     a. [facility] admission with chest pain, November 2020   b. LHC 11/20/20: Normal LV gram and normal coronaries   c. Echo 11/20/20: limited study with Lumason reveals NORMAL global and segmental wall motion and LVEF 67%     • Uterine leiomyoma 03/16/2020   • Basal cell carcinoma 03/16/2020               History of Present Illness  Patient presents today for follow-up with a history of paroxysmal atrial fibrillation status post pulmonary vein isolation with structurally normal heart.  She returns today with a complaint of \"quivering\" which last for a few seconds and occurs most frequently at night while in bed.  She has no awareness of sustained tachyarrhythmias, denies dizziness or syncope.  She does not check her blood pressure regularly at home.  She recently started exercising.    Allergies   Allergen Reactions   • Penicillins Hives and Rash   • Percocet [Oxycodone-Acetaminophen] Itching       Current Outpatient Medications   Medication Instructions   • desonide (DESOWEN) 0.05 % ointment As Needed   • doxepin (SINEQUAN) 10 mg, Oral, Daily PRN   • fluvoxaMINE (LUVOX) 200 mg, Nightly   • hydrOXYzine (ATARAX) 25 mg, Oral, As Needed, For anxiety   • linaclotide (LINZESS) 72 mcg, " "Oral, Every Morning Before Breakfast   • metoprolol succinate XL (TOPROL-XL) 25 MG 24 hr tablet Daily   • multivitamin with minerals tablet tablet 1 tablet, Oral, Daily       .    Objective:     /70 (BP Location: Right arm, Patient Position: Sitting)   Pulse 85   Ht 177.8 cm (70\")   Wt 89.8 kg (198 lb)   SpO2 97%   BMI 28.41 kg/m²    Body mass index is 28.41 kg/m².     Vitals reviewed.   Constitutional:       Appearance: Well-developed.   Pulmonary:      Effort: Pulmonary effort is normal. No respiratory distress.      Breath sounds: Normal breath sounds. No wheezing. No rales.      Comments: Bases clear  Chest:      Chest wall: Not tender to palpatation.   Cardiovascular:      Normal rate. Regular rhythm.      Murmurs: There is no murmur.      No gallop. No click. No rub.   Pulses:     Intact distal pulses.   Musculoskeletal: Normal range of motion.       Lab Review:                           Procedures               Assessment:      Diagnosis Plan   1. Paroxysmal atrial fibrillation (HCC)   sinus rhythm status post pulmonary vein isolation.  Normotensive with CHADS VASc 1.  Refilled metoprolol at current dose      2. Palpitations  Holter Monitor - 72 Hour Up To 15 Days        Plan:     Stable cardiac status.  Continue current medications.   in 1 year, sooner as needed.  Thank you for allowing us to participate in the care of your patient.     Electronically signed by YEISON Mcconnell, 01/23/23, 3:35 PM EST.    "

## 2023-02-18 ENCOUNTER — APPOINTMENT (OUTPATIENT)
Dept: GENERAL RADIOLOGY | Facility: HOSPITAL | Age: 53
End: 2023-02-18
Payer: COMMERCIAL

## 2023-02-18 ENCOUNTER — HOSPITAL ENCOUNTER (EMERGENCY)
Facility: HOSPITAL | Age: 53
Discharge: HOME | End: 2023-02-18
Attending: EMERGENCY MEDICINE | Admitting: EMERGENCY MEDICINE
Payer: COMMERCIAL

## 2023-02-18 VITALS
SYSTOLIC BLOOD PRESSURE: 116 MMHG | HEIGHT: 70 IN | HEART RATE: 74 BPM | OXYGEN SATURATION: 96 % | BODY MASS INDEX: 26.63 KG/M2 | TEMPERATURE: 97.6 F | DIASTOLIC BLOOD PRESSURE: 75 MMHG | RESPIRATION RATE: 20 BRPM | WEIGHT: 186 LBS

## 2023-02-18 DIAGNOSIS — R07.89 CHEST DISCOMFORT: Primary | ICD-10-CM

## 2023-02-18 DIAGNOSIS — D72.829 LEUKOCYTOSIS, UNSPECIFIED TYPE: ICD-10-CM

## 2023-02-18 DIAGNOSIS — E87.6 HYPOKALEMIA: ICD-10-CM

## 2023-02-18 DIAGNOSIS — R00.2 PALPITATIONS: ICD-10-CM

## 2023-02-18 LAB
ALBUMIN SERPL-MCNC: 4.5 G/DL (ref 3.5–5.2)
ALBUMIN/GLOB SERPL: 1.5 G/DL
ALP SERPL-CCNC: 110 U/L (ref 39–117)
ALT SERPL W P-5'-P-CCNC: 21 U/L (ref 1–33)
ANION GAP SERPL CALCULATED.3IONS-SCNC: 14 MMOL/L (ref 5–15)
AST SERPL-CCNC: 19 U/L (ref 1–32)
BASOPHILS # BLD AUTO: 0.06 10*3/MM3 (ref 0–0.2)
BASOPHILS NFR BLD AUTO: 0.5 % (ref 0–1.5)
BILIRUB SERPL-MCNC: 0.3 MG/DL (ref 0–1.2)
BUN SERPL-MCNC: 12 MG/DL (ref 6–20)
BUN/CREAT SERPL: 12.2 (ref 7–25)
CALCIUM SPEC-SCNC: 9.4 MG/DL (ref 8.6–10.5)
CHLORIDE SERPL-SCNC: 100 MMOL/L (ref 98–107)
CO2 SERPL-SCNC: 27 MMOL/L (ref 22–29)
CREAT SERPL-MCNC: 0.98 MG/DL (ref 0.57–1)
D DIMER PPP FEU-MCNC: <0.27 MCGFEU/ML (ref 0–0.51)
DEPRECATED RDW RBC AUTO: 42.6 FL (ref 37–54)
EGFRCR SERPLBLD CKD-EPI 2021: 70 ML/MIN/1.73
EOSINOPHIL # BLD AUTO: 0.16 10*3/MM3 (ref 0–0.4)
EOSINOPHIL NFR BLD AUTO: 1.4 % (ref 0.3–6.2)
ERYTHROCYTE [DISTWIDTH] IN BLOOD BY AUTOMATED COUNT: 13.7 % (ref 12.3–15.4)
GEN 5 2HR TROPONIN T REFLEX: <6 NG/L
GLOBULIN UR ELPH-MCNC: 3 GM/DL
GLUCOSE SERPL-MCNC: 114 MG/DL (ref 65–99)
HCT VFR BLD AUTO: 43.4 % (ref 34–46.6)
HGB BLD-MCNC: 13.9 G/DL (ref 12–15.9)
HOLD SPECIMEN: NORMAL
IMM GRANULOCYTES # BLD AUTO: 0.04 10*3/MM3 (ref 0–0.05)
IMM GRANULOCYTES NFR BLD AUTO: 0.4 % (ref 0–0.5)
LYMPHOCYTES # BLD AUTO: 3.84 10*3/MM3 (ref 0.7–3.1)
LYMPHOCYTES NFR BLD AUTO: 34 % (ref 19.6–45.3)
MAGNESIUM SERPL-MCNC: 2 MG/DL (ref 1.6–2.6)
MCH RBC QN AUTO: 27.3 PG (ref 26.6–33)
MCHC RBC AUTO-ENTMCNC: 32 G/DL (ref 31.5–35.7)
MCV RBC AUTO: 85.1 FL (ref 79–97)
MONOCYTES # BLD AUTO: 0.68 10*3/MM3 (ref 0.1–0.9)
MONOCYTES NFR BLD AUTO: 6 % (ref 5–12)
NEUTROPHILS NFR BLD AUTO: 57.7 % (ref 42.7–76)
NEUTROPHILS NFR BLD AUTO: 6.53 10*3/MM3 (ref 1.7–7)
NRBC BLD AUTO-RTO: 0 /100 WBC (ref 0–0.2)
NT-PROBNP SERPL-MCNC: 41.3 PG/ML (ref 0–900)
PLATELET # BLD AUTO: 350 10*3/MM3 (ref 140–450)
PMV BLD AUTO: 10 FL (ref 6–12)
POTASSIUM SERPL-SCNC: 3.4 MMOL/L (ref 3.5–5.2)
PROCALCITONIN SERPL-MCNC: 0.03 NG/ML (ref 0–0.25)
PROT SERPL-MCNC: 7.5 G/DL (ref 6–8.5)
QT INTERVAL: 314 MS
QT INTERVAL: 386 MS
QTC INTERVAL: 431 MS
QTC INTERVAL: 456 MS
RBC # BLD AUTO: 5.1 10*6/MM3 (ref 3.77–5.28)
SODIUM SERPL-SCNC: 141 MMOL/L (ref 136–145)
TROPONIN T DELTA: NORMAL
TROPONIN T SERPL HS-MCNC: <6 NG/L
TSH SERPL DL<=0.05 MIU/L-ACNC: 3.88 UIU/ML (ref 0.27–4.2)
WBC NRBC COR # BLD: 11.31 10*3/MM3 (ref 3.4–10.8)
WHOLE BLOOD HOLD COAG: NORMAL
WHOLE BLOOD HOLD SPECIMEN: NORMAL

## 2023-02-18 PROCEDURE — 36415 COLL VENOUS BLD VENIPUNCTURE: CPT

## 2023-02-18 PROCEDURE — 93005 ELECTROCARDIOGRAM TRACING: CPT | Performed by: EMERGENCY MEDICINE

## 2023-02-18 PROCEDURE — 83880 ASSAY OF NATRIURETIC PEPTIDE: CPT | Performed by: EMERGENCY MEDICINE

## 2023-02-18 PROCEDURE — 25010000002 MAGNESIUM SULFATE IN D5W 1G/100ML (PREMIX) 1-5 GM/100ML-% SOLUTION: Performed by: EMERGENCY MEDICINE

## 2023-02-18 PROCEDURE — 84484 ASSAY OF TROPONIN QUANT: CPT | Performed by: EMERGENCY MEDICINE

## 2023-02-18 PROCEDURE — 99283 EMERGENCY DEPT VISIT LOW MDM: CPT

## 2023-02-18 PROCEDURE — 71045 X-RAY EXAM CHEST 1 VIEW: CPT

## 2023-02-18 PROCEDURE — 85379 FIBRIN DEGRADATION QUANT: CPT | Performed by: EMERGENCY MEDICINE

## 2023-02-18 PROCEDURE — 84145 PROCALCITONIN (PCT): CPT | Performed by: EMERGENCY MEDICINE

## 2023-02-18 PROCEDURE — 99284 EMERGENCY DEPT VISIT MOD MDM: CPT

## 2023-02-18 PROCEDURE — 83735 ASSAY OF MAGNESIUM: CPT | Performed by: EMERGENCY MEDICINE

## 2023-02-18 PROCEDURE — 80050 GENERAL HEALTH PANEL: CPT | Performed by: EMERGENCY MEDICINE

## 2023-02-18 PROCEDURE — 25010000002 LORAZEPAM PER 2 MG: Performed by: EMERGENCY MEDICINE

## 2023-02-18 PROCEDURE — 96375 TX/PRO/DX INJ NEW DRUG ADDON: CPT

## 2023-02-18 PROCEDURE — 96365 THER/PROPH/DIAG IV INF INIT: CPT

## 2023-02-18 RX ORDER — MAGNESIUM SULFATE 1 G/100ML
1 INJECTION INTRAVENOUS ONCE
Status: COMPLETED | OUTPATIENT
Start: 2023-02-18 | End: 2023-02-18

## 2023-02-18 RX ORDER — SODIUM CHLORIDE 0.9 % (FLUSH) 0.9 %
10 SYRINGE (ML) INJECTION AS NEEDED
Status: DISCONTINUED | OUTPATIENT
Start: 2023-02-18 | End: 2023-02-18 | Stop reason: HOSPADM

## 2023-02-18 RX ORDER — LORAZEPAM 2 MG/ML
0.5 INJECTION INTRAMUSCULAR ONCE
Status: COMPLETED | OUTPATIENT
Start: 2023-02-18 | End: 2023-02-18

## 2023-02-18 RX ORDER — METOPROLOL TARTRATE 5 MG/5ML
2.5 INJECTION INTRAVENOUS ONCE
Status: COMPLETED | OUTPATIENT
Start: 2023-02-18 | End: 2023-02-18

## 2023-02-18 RX ADMIN — METOPROLOL TARTRATE 2.5 MG: 5 INJECTION INTRAVENOUS at 09:58

## 2023-02-18 RX ADMIN — LORAZEPAM 0.5 MG: 2 INJECTION INTRAMUSCULAR; INTRAVENOUS at 09:54

## 2023-02-18 RX ADMIN — MAGNESIUM SULFATE HEPTAHYDRATE 1 G: 1 INJECTION, SOLUTION INTRAVENOUS at 10:04

## 2023-02-18 NOTE — DISCHARGE INSTRUCTIONS
Consume a bit of potassium rich foods such as bananas, or apple or orange juice.    If recurrent episode you may want to double up on your metoprolol as you did today.

## 2023-02-18 NOTE — ED PROVIDER NOTES
Subjective   History of Present Illness  This is a very pleasant 52-year-old female who is accompanied by her cousin.  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  She sees cardiology here as she has had previous pulmonary vein ablation for paroxysmal A-fib.  I reviewed her most recent cardiology note.  She has had normal left heart catheterization 2 years ago.    She just mailed in an event monitor that she is worn for about a month results of that are pending.  I reviewed her event monitor from 2 years ago which showed SVT a few episodes along with PVCs and PACs.    She presents emergency room today with chest discomfort associated with palpitations that began this morning.  She had a normal day yesterday but woke up this morning at about 4 AM feeling her heart fluttering and numbness in both her arms and some chest pressure.  She never had anything quite like this before.  She took an extra 25 mg metoprolol but her symptoms though they are a bit improved are still present.  This is nothing like episode she has had with SVT or A-fib in the past.    She did start back exercising a few weeks ago and is noticed no exertional chest pain or dyspnea.  She has no history of blood clots.  She has not been ill with fevers or chills runny nose sore throat or cough.  Bowel movements and urine have been her baseline.  No recent medication changes.    No history of sleep apnea.  She does get big leg cramps at night and had severe leg cramps last night.  She has never had a sleep study.  She does not snore has no daytime sleepiness.        All other systems reviewed and are negative except as noted above.        Review of Systems   All other systems reviewed and are negative.      Past Medical History:   Diagnosis Date   • A-fib (HCC)    • Abnormal ECG March 2020    Atrial fib with Rvr   • Arrhythmia March 2020    Atrial fib w/ RVR   • Basal cell carcinoma 3/16/2020   • Uterine leiomyoma 3/16/2020   from nohemy  Radha note    Diagnosis Date Noted   • Paroxysmal atrial fibrillation (HCC) 05/18/2020       Priority: High       Note Last Updated: 1/23/2023       a. occurred in setting of use of Armor Thyroid for possible hypothyroidism in past.   b. Failed ECV x 2, Flecainide and Rythmol   c. Echo March 2020 with EF 60%   d. CHADSVASC= 1  e. S/p PVA June 2020, Dr. Gonzalez  f. Recurrent tachypalpitations with presyncope, winter 3742-1966.  g. 30 day event March 2021: short runs of atrial tachycardia       • Anxiety 01/23/2023   • Precordial chest pain 11/19/2020       Note Last Updated: 1/23/2023       a. [facility] admission with chest pain, November 2020   b. LHC 11/20/20: Normal LV gram and normal coronaries   c. Echo 11/20/20: limited study with Lumason reveals NORMAL global and segmental wall motion and LVEF 67%      • Uterine leiomyoma 03/16/2020   • Basal cell carcinoma 03/16/2020                   Allergies   Allergen Reactions   • Penicillins Hives and Rash   • Percocet [Oxycodone-Acetaminophen] Itching       Past Surgical History:   Procedure Laterality Date   • ABLATION OF DYSRHYTHMIC FOCUS  6/2/2020   • CARDIAC CATHETERIZATION N/A 11/20/2020    Procedure: Left Heart Cath;  Surgeon: Sulaiman Mcdermott MD;  Location:  [facility];  Service: Cardiology;  Laterality: N/A;   • CARDIAC ELECTROPHYSIOLOGY PROCEDURE N/A 6/2/2020    Procedure: PVA (paroxysmal), no meds to hold, Precision (SJM), ASAP  ;  Surgeon: Thad Gonzalez DO;  Location:  [facility] EP INVASIVE LOCATION;  Service: Cardiovascular;  Laterality: N/A;   • MANDIBULAR AND MAXILLARY OSTEOTOMY  1993    FOR tmj    • RHINOPLASTY  2004       Family History   Problem Relation Age of Onset   • Atrial fibrillation Mother    • Heart failure Mother    • Arrhythmia Mother         Atrial fib   • Hypertension Mother    • Anemia Mother         Treated with iron replacement-Feraheme   • Atrial fibrillation Father    • Arrhythmia Father         Atrial fib   •  Hyperlipidemia Father    • Hypertension Father        Social History     Socioeconomic History   • Marital status:    Tobacco Use   • Smoking status: Never   • Smokeless tobacco: Never   Vaping Use   • Vaping Use: Never used   Substance and Sexual Activity   • Alcohol use: Yes     Alcohol/week: 1.0 - 2.0 standard drink     Types: 1 - 2 Glasses of wine per week     Comment: ocass   • Drug use: Never   • Sexual activity: Yes     Partners: Male     Birth control/protection: None           Objective   Physical Exam  Vitals and nursing note reviewed.   Constitutional:       Appearance: She is normal weight.      Comments: This is a pleasant 52-year-old alert and oriented GCS 15.  When I examined her her heart rate was 95 beats a minute sinus.   HENT:      Right Ear: External ear normal.      Left Ear: External ear normal.      Nose: Nose normal.      Mouth/Throat:      Mouth: Mucous membranes are moist.      Pharynx: Oropharynx is clear.   Eyes:      Extraocular Movements: Extraocular movements intact.      Conjunctiva/sclera: Conjunctivae normal.      Pupils: Pupils are equal, round, and reactive to light.   Cardiovascular:      Rate and Rhythm: Normal rate and regular rhythm.      Pulses: Normal pulses.      Heart sounds: Normal heart sounds.   Pulmonary:      Effort: Pulmonary effort is normal.      Breath sounds: Normal breath sounds.   Abdominal:      General: Abdomen is flat. Bowel sounds are normal. There is no distension.      Palpations: Abdomen is soft.   Musculoskeletal:      Cervical back: Normal range of motion and neck supple.      Comments: Equal pulses without edema, synovitis, rash, or venous cords.   Skin:     General: Skin is warm and dry.      Capillary Refill: Capillary refill takes less than 2 seconds.   Neurological:      Mental Status: She is alert.      Comments: Face symmetric, voice strong, tongue midline.  Vision, hearing, and speech preserved.  No focal weakness         Procedures            ED Course      Recent Results (from the past 24 hour(s))   ECG 12 Lead ED Triage Standing Order; Dysrhythmia    Collection Time: 02/18/23  8:39 AM   Result Value Ref Range    QT Interval 314 ms    QTC Interval 456 ms   Comprehensive Metabolic Panel    Collection Time: 02/18/23  8:52 AM    Specimen: Blood   Result Value Ref Range    Glucose 114 (H) 65 - 99 mg/dL    BUN 12 6 - 20 mg/dL    Creatinine 0.98 0.57 - 1.00 mg/dL    Sodium 141 136 - 145 mmol/L    Potassium 3.4 (L) 3.5 - 5.2 mmol/L    Chloride 100 98 - 107 mmol/L    CO2 27.0 22.0 - 29.0 mmol/L    Calcium 9.4 8.6 - 10.5 mg/dL    Total Protein 7.5 6.0 - 8.5 g/dL    Albumin 4.5 3.5 - 5.2 g/dL    ALT (SGPT) 21 1 - 33 U/L    AST (SGOT) 19 1 - 32 U/L    Alkaline Phosphatase 110 39 - 117 U/L    Total Bilirubin 0.3 0.0 - 1.2 mg/dL    Globulin 3.0 gm/dL    A/G Ratio 1.5 g/dL    BUN/Creatinine Ratio 12.2 7.0 - 25.0    Anion Gap 14.0 5.0 - 15.0 mmol/L    eGFR 70.0 >60.0 mL/min/1.73   Magnesium    Collection Time: 02/18/23  8:52 AM    Specimen: Blood   Result Value Ref Range    Magnesium 2.0 1.6 - 2.6 mg/dL   High Sensitivity Troponin T    Collection Time: 02/18/23  8:52 AM    Specimen: Blood   Result Value Ref Range    HS Troponin T <6 <10 ng/L   TSH    Collection Time: 02/18/23  8:52 AM    Specimen: Blood   Result Value Ref Range    TSH 3.880 0.270 - 4.200 uIU/mL   BNP    Collection Time: 02/18/23  8:52 AM    Specimen: Blood   Result Value Ref Range    proBNP 41.3 0.0 - 900.0 pg/mL   Green Top (Gel)    Collection Time: 02/18/23  8:52 AM   Result Value Ref Range    Extra Tube Hold for add-ons.    Lavender Top    Collection Time: 02/18/23  8:52 AM   Result Value Ref Range    Extra Tube hold for add-on    Gold Top - SST    Collection Time: 02/18/23  8:52 AM   Result Value Ref Range    Extra Tube Hold for add-ons.    Gray Top    Collection Time: 02/18/23  8:52 AM   Result Value Ref Range    Extra Tube Hold for add-ons.    Light Blue Top    Collection Time: 02/18/23   8:52 AM   Result Value Ref Range    Extra Tube Hold for add-ons.    CBC Auto Differential    Collection Time: 02/18/23  8:52 AM    Specimen: Blood   Result Value Ref Range    WBC 11.31 (H) 3.40 - 10.80 10*3/mm3    RBC 5.10 3.77 - 5.28 10*6/mm3    Hemoglobin 13.9 12.0 - 15.9 g/dL    Hematocrit 43.4 34.0 - 46.6 %    MCV 85.1 79.0 - 97.0 fL    MCH 27.3 26.6 - 33.0 pg    MCHC 32.0 31.5 - 35.7 g/dL    RDW 13.7 12.3 - 15.4 %    RDW-SD 42.6 37.0 - 54.0 fl    MPV 10.0 6.0 - 12.0 fL    Platelets 350 140 - 450 10*3/mm3    Neutrophil % 57.7 42.7 - 76.0 %    Lymphocyte % 34.0 19.6 - 45.3 %    Monocyte % 6.0 5.0 - 12.0 %    Eosinophil % 1.4 0.3 - 6.2 %    Basophil % 0.5 0.0 - 1.5 %    Immature Grans % 0.4 0.0 - 0.5 %    Neutrophils, Absolute 6.53 1.70 - 7.00 10*3/mm3    Lymphocytes, Absolute 3.84 (H) 0.70 - 3.10 10*3/mm3    Monocytes, Absolute 0.68 0.10 - 0.90 10*3/mm3    Eosinophils, Absolute 0.16 0.00 - 0.40 10*3/mm3    Basophils, Absolute 0.06 0.00 - 0.20 10*3/mm3    Immature Grans, Absolute 0.04 0.00 - 0.05 10*3/mm3    nRBC 0.0 0.0 - 0.2 /100 WBC   D-dimer, Quantitative    Collection Time: 02/18/23  8:52 AM    Specimen: Blood   Result Value Ref Range    D-Dimer, Quantitative <0.27 0.00 - 0.51 MCGFEU/mL   Procalcitonin    Collection Time: 02/18/23  8:52 AM    Specimen: Blood   Result Value Ref Range    Procalcitonin 0.03 0.00 - 0.25 ng/mL   High Sensitivity Troponin T 2Hr    Collection Time: 02/18/23 11:05 AM    Specimen: Blood   Result Value Ref Range    HS Troponin T <6 <10 ng/L    Troponin T Delta     ECG 12 Lead Chest Pain    Collection Time: 02/18/23 11:12 AM   Result Value Ref Range    QT Interval 386 ms    QTC Interval 431 ms     Note: In addition to lab results from this visit, the labs listed above may include labs taken at another facility or during a different encounter within the last 24 hours. Please correlate lab times with ED admission and discharge times for further clarification of the services performed  during this visit.    XR Chest 1 View   Final Result   Impression:   No evidence of active chest disease.      Electronically Signed: Gil Darnell     2/18/2023 9:02 AM EST     Workstation ID: [id number]        Vitals:    02/18/23 1100 02/18/23 1128 02/18/23 1200 02/18/23 1230   BP: 108/75 114/67 128/75 116/75   Pulse: 77 81 78 74   Resp:       Temp:       SpO2: 98% 100% 95% 96%   Weight:       Height:         Medications   sodium chloride 0.9 % flush 10 mL (has no administration in time range)   metoprolol tartrate (LOPRESSOR) injection 2.5 mg (2.5 mg Intravenous Given 2/18/23 0958)   LORazepam (ATIVAN) injection 0.5 mg (0.5 mg Intravenous Given 2/18/23 0954)   magnesium sulfate in D5W 1g/100mL (PREMIX) (0 g Intravenous Stopped 2/18/23 1109)     ECG/EMG Results (last 24 hours)     Procedure Component Value Units Date/Time    ECG 12 Lead ED Triage Standing Order; Dysrhythmia [[id number]] Collected: 02/18/23 0839     Updated: 02/18/23 0908     QT Interval 314 ms      QTC Interval 456 ms     Narrative:      Test Reason : RHR  Blood Pressure :   */*   mmHG  Vent. Rate : 127 BPM     Atrial Rate : 127 BPM     P-R Int : 154 ms          QRS Dur :  82 ms      QT Int : 314 ms       P-R-T Axes :  75  57  16 degrees     QTc Int : 456 ms    Sinus tachycardia  Otherwise normal ECG  When compared with ECG of 20-NOV-2020 05:28,  Vent. rate has increased BY  47 BPM  ST now depressed in Lateral leads  Confirmed by GIDEON HERNANDEZ MD ([id number]) on 2/18/2023 9:08:10 AM    Referred By: ED MD           Confirmed By: GIDEON HERNANDEZ MD        ECG 12 Lead Chest Pain   Final Result   Test Reason : Chest Pain   Blood Pressure :   */*   mmHG   Vent. Rate :  75 BPM     Atrial Rate :  75 BPM      P-R Int : 140 ms          QRS Dur :  82 ms       QT Int : 386 ms       P-R-T Axes :  67  27  13 degrees      QTc Int : 431 ms      Normal sinus rhythm   Normal ECG   When compared with ECG of 18-FEB-2023 08:39,   Vent. rate has decreased BY  52 BPM   Confirmed by  GIDEON HERNANDEZ MD ([id number]) on 2/18/2023 2:31:49 PM      Referred By: AMINA           Confirmed By: GIDEON HERNANDEZ MD      ECG 12 Lead ED Triage Standing Order; Dysrhythmia   Final Result   Test Reason : RHR   Blood Pressure :   */*   mmHG   Vent. Rate : 127 BPM     Atrial Rate : 127 BPM      P-R Int : 154 ms          QRS Dur :  82 ms       QT Int : 314 ms       P-R-T Axes :  75  57  16 degrees      QTc Int : 456 ms      Sinus tachycardia   Otherwise normal ECG   When compared with ECG of 20-NOV-2020 05:28,   Vent. rate has increased BY  47 BPM   ST now depressed in Lateral leads   Confirmed by GIDEON HERNANDEZ MD ([id number]) on 2/18/2023 9:08:10 AM      Referred By: TAMELA ROMANO           Confirmed By: GIDEON HERNANDEZ MD                                             Medical Decision Making        I have reviewed all available studies at the bedside with the patient and her family.  Thankfully 2 troponins and EKGs are bland.  Initial tachycardia is much improved she received low-dose beta-blocker here along with little bit of Ativan and magnesium.  She has a leukocytosis with a normal procalcitonin I suspect this is reactive in nature.    Her most recent event monitor is pending.  I think she needs to follow-up with her cardiologist to see the results of this to see if she is having arrhythmias drive it.  Unlikely to be ischemia due to normal left heart catheterization recently and her trip opponents here today are normal as is her D-dimer.    She has a very mild hypokalemia and I talked her about treatment of this.    Discussed strategies to treat this at home such as taking an extra dose of beta-blocker.    She will return to the ED if worse in any way.    All are agreeable with plan    Chest discomfort: acute illness or injury that poses a threat to life or bodily functions  Hypokalemia: undiagnosed new problem with uncertain prognosis  Leukocytosis, unspecified type: complicated acute illness or injury  Palpitations: acute illness or  injury with systemic symptoms that poses a threat to life or bodily functions  Amount and/or Complexity of Data Reviewed  Independent Historian: friend  External Data Reviewed: labs, radiology, ECG and notes.  Labs: ordered. Decision-making details documented in ED Course.  Radiology: ordered and independent interpretation performed. Decision-making details documented in ED Course.  ECG/medicine tests: ordered and independent interpretation performed. Decision-making details documented in ED Course.      Risk  Prescription drug management.  Parenteral controlled substances.          Final diagnoses:   Chest discomfort   Palpitations   Hypokalemia   Leukocytosis, unspecified type       ED Disposition  ED Disposition     ED Disposition   Discharge    Condition   Stable    Comment   --             Tyrel Mehta, DO  [address]   [facility] [id number] [phone]    Schedule an appointment as soon as possible for a visit       Thad Gonzalez, DO  [address]  [address]  [facility] [id number] [phone]    Schedule an appointment as soon as possible for a visit   Follow-up on your event monitor         Medication List      No changes were made to your prescriptions during this visit.          Quinten Romero MD  02/18/23 2385

## 2023-09-08 RX ORDER — METOPROLOL SUCCINATE 50 MG/1
TABLET, EXTENDED RELEASE ORAL
Qty: 90 TABLET | Refills: 3 | Status: SHIPPED | OUTPATIENT
Start: 2023-09-08

## 2024-01-29 ENCOUNTER — OFFICE VISIT (OUTPATIENT)
Dept: CARDIOLOGY | Facility: CLINIC | Age: 54
End: 2024-01-29
Payer: COMMERCIAL

## 2024-01-29 VITALS
OXYGEN SATURATION: 98 % | WEIGHT: 164 LBS | SYSTOLIC BLOOD PRESSURE: 96 MMHG | BODY MASS INDEX: 23.48 KG/M2 | HEART RATE: 87 BPM | HEIGHT: 70 IN | DIASTOLIC BLOOD PRESSURE: 60 MMHG

## 2024-01-29 DIAGNOSIS — I48.0 PAROXYSMAL ATRIAL FIBRILLATION: Primary | ICD-10-CM

## 2024-01-29 PROCEDURE — 99214 OFFICE O/P EST MOD 30 MIN: CPT | Performed by: INTERNAL MEDICINE

## 2024-01-29 RX ORDER — HYDROXYZINE PAMOATE 25 MG/1
25 CAPSULE ORAL AS NEEDED
COMMUNITY
Start: 2024-01-23

## 2024-01-29 RX ORDER — FINASTERIDE 1 MG/1
1 TABLET, FILM COATED ORAL DAILY
COMMUNITY
Start: 2024-01-22

## 2024-01-29 NOTE — PROGRESS NOTES
Cardiac Electrophysiology Outpatient Follow Up Note            [facility]    Follow Up Office Visit      Sumi Pisano  [id number]  01/29/2024  [unfilled]  [unfilled]    Primary Care Physician: Tyrel Mehta DO    Referred By: No ref. provider found    Subjective     Chief Complaint:   Diagnoses and all orders for this visit:    1. Paroxysmal atrial fibrillation (Primary)      Chief Complaint   Patient presents with    PAF       History of Present Illness:   Sumi Pisano is a 53 y.o. female who presents to my electrophysiology clinic for follow up of above complaints.  Feels overall quite well.  Some palpitations usually at night.  Entirely different than her experience with A-fib however.      Past Medical History:   Past Medical History:   Diagnosis Date    A-fib     Abnormal ECG March 2020    Atrial fib with Rvr    Arrhythmia March 2020    Atrial fib w/ RVR    Basal cell carcinoma 3/16/2020    Uterine leiomyoma 3/16/2020       Past Surgical History:   Past Surgical History:   Procedure Laterality Date    ABLATION OF DYSRHYTHMIC FOCUS  6/2/2020    CARDIAC CATHETERIZATION N/A 11/20/2020    Procedure: Left Heart Cath;  Surgeon: Sulaiman Mcdermott MD;  Location: [facility] CATH INVASIVE LOCATION;  Service: Cardiology;  Laterality: N/A;    CARDIAC ELECTROPHYSIOLOGY PROCEDURE N/A 6/2/2020    Procedure: PVA (paroxysmal), no meds to hold, Precision (SJM), ASAP  ;  Surgeon: Thad Gonzalez DO;  Location: [facility] EP INVASIVE LOCATION;  Service: Cardiovascular;  Laterality: N/A;    MANDIBULAR AND MAXILLARY OSTEOTOMY  1993    FOR tmj     RHINOPLASTY  2004       Family History:   Family History   Problem Relation Age of Onset    Atrial fibrillation Mother     Heart failure Mother     Arrhythmia Mother         Atrial fib    Hypertension Mother     Anemia Mother         Treated with iron replacement-Feraheme    Atrial fibrillation Father     Arrhythmia Father         " Atrial fib    Hyperlipidemia Father     Hypertension Father        Social History:   Social History     Socioeconomic History    Marital status:    Tobacco Use    Smoking status: Never    Smokeless tobacco: Never   Vaping Use    Vaping Use: Never used   Substance and Sexual Activity    Alcohol use: Yes     Alcohol/week: 1.0 standard drink of alcohol     Types: 1 Glasses of wine per week     Comment: ocass    Drug use: Never    Sexual activity: Yes     Partners: Male     Birth control/protection: None       Medications:     Current Outpatient Medications:     doxepin (SINEquan) 10 MG capsule, Take 1 capsule by mouth Daily As Needed., Disp: , Rfl:     finasteride (PROPECIA) 1 MG tablet, Take 1 tablet by mouth Daily., Disp: , Rfl:     fluvoxaMINE (LUVOX) 50 MG tablet, 4 tablets Every Night., Disp: , Rfl:     hydrOXYzine pamoate (VISTARIL) 25 MG capsule, Take 1 capsule by mouth As Needed., Disp: , Rfl:     linaclotide (Linzess) 72 MCG capsule capsule, Take 1 capsule by mouth Every Morning Before Breakfast., Disp: , Rfl:     metoprolol succinate XL (TOPROL-XL) 50 MG 24 hr tablet, TAKE 1 TABLET BY MOUTH EVERY DAY, Disp: 90 tablet, Rfl: 3    multivitamin with minerals tablet tablet, Take 1 tablet by mouth Daily., Disp: , Rfl:     PROGESTERONE PO, Take 400 mg by mouth Daily., Disp: , Rfl:     Semaglutide 3 MG tablet, Take 2.5 mg by mouth Daily., Disp: , Rfl:     Allergies:   Allergies   Allergen Reactions    Penicillins Hives and Rash    Percocet [Oxycodone-Acetaminophen] Itching       Objective   Vital Signs:   Vitals:    01/29/24 1523   BP: 96/60   BP Location: Left arm   Patient Position: Sitting   Cuff Size: Adult   Pulse: 87   SpO2: 98%   Weight: 74.4 kg (164 lb)   Height: 177.8 cm (70\")       PHYSICAL EXAM  General appearance: Awake, alert, cooperative  Head: Normocephalic, without obvious abnormality, atraumatic  Eyes: Conjunctivae/corneas clear, EOMs intact  Neck: no adenopathy, no carotid bruit, no JVD, " "and thyroid: not enlarged  Lungs: clear to auscultation bilaterally and no rhonchi or crackles\", ' symmetric  Heart: regular rate and rhythm, S1, S2 normal, no murmur, click, rub or gallop  Abdomen: Soft, non-tender, bowel sounds normal,  no organomegaly  Extremities: extremities normal, atraumatic, no cyanosis or edema  Skin: Skin color, turgor normal, no rashes or lesions  Neurologic: Grossly normal     Lab Results   Component Value Date    GLUCOSE 114 (H) 02/18/2023    CALCIUM 9.4 02/18/2023     02/18/2023    K 3.4 (L) 02/18/2023    CO2 27.0 02/18/2023     02/18/2023    BUN 12 02/18/2023    CREATININE 0.98 02/18/2023    EGFRIFNONA 99 11/20/2020    BCR 12.2 02/18/2023    ANIONGAP 14.0 02/18/2023     Lab Results   Component Value Date    WBC 11.31 (H) 02/18/2023    HGB 13.9 02/18/2023    HCT 43.4 02/18/2023    MCV 85.1 02/18/2023     02/18/2023     No results found for: \"INR\", \"PROTIME\"  Lab Results   Component Value Date    TSH 3.880 02/18/2023       Cardiac Testing:     I personally viewed and interpreted the patient's EKG/Telemetry/lab data    Procedures    Tobacco Cessation: N/A  Obstructive Sleep Apnea Screening: Completed    Advance Care Planning   ACP discussion was declined by the patient. Patient does not have an advance directive, declines further assistance.       Assessment & Plan    Diagnoses and all orders for this visit:    1. Paroxysmal atrial fibrillation (Primary)         Diagnosis Plan   1. Paroxysmal atrial fibrillation  Ablated.  No recurrence.    It is now been almost 4 years since her ablation.    [id number] equals 1 due to female sex which in and of itself does not confer any incremental risk.  She is not anticoagulated appropriately.       Short episodes of very brief and irregular rapid palpitations occurring chiefly in the evening.  Beta-blocker is effective for this however does make her feel fatigued during the day.  She takes in the morning.    We will switch to the " short acting preparation metoprolol and have her take it at night.  I think this will go a long way making her feel better if it does not we will bring her back and we will discuss the option of a catheter ablation for what indeed may be an atrial tachycardia versus alternatively a low-dose of flecainide.  Otherwise we will see her in a year.     Body mass index is 23.53 kg/m².    I spent 38 minutes in consultation with this patient which included more than 65% of this time in direct face-to-face counseling, physical examination and discussion of my assessment and findings and this shared decision making with the patient.  The remainder of the time not spent face-to-face was performing one, some or all of the following actions: preparing to see the patient (e.g. reviewing tests, prior clinicians' notes, etc), ordering medications, tests or procedures, coordination of care, discussion of the plan with other healthcare providers, documenting clinical information in epic as well as independently interpreting results and communication of these results to the patient family and/or caregiver(s).  Please note that this explicitly excludes time spent on other separate billable services such as performing procedures or test interpretation, when applicable.      Follow Up:       Thank you for allowing me to participate in the care of your patient. Please to not hesitate to contact me with additional questions or concerns.      Thad Gonzalez DO, FACC, RS  Cardiac Electrophysiologist  [facility] / [facility]

## 2025-01-17 RX ORDER — METOPROLOL TARTRATE 25 MG/1
25 TABLET, FILM COATED ORAL
Qty: 90 TABLET | Refills: 3 | Status: SHIPPED | OUTPATIENT
Start: 2025-01-17

## 2025-02-03 ENCOUNTER — OFFICE VISIT (OUTPATIENT)
Dept: CARDIOLOGY | Facility: CLINIC | Age: 55
End: 2025-02-03
Payer: COMMERCIAL

## 2025-02-03 VITALS
BODY MASS INDEX: 23.85 KG/M2 | DIASTOLIC BLOOD PRESSURE: 60 MMHG | OXYGEN SATURATION: 98 % | WEIGHT: 161 LBS | HEART RATE: 71 BPM | HEIGHT: 69 IN | SYSTOLIC BLOOD PRESSURE: 100 MMHG

## 2025-02-03 DIAGNOSIS — R00.2 PALPITATIONS: ICD-10-CM

## 2025-02-03 DIAGNOSIS — I48.0 PAROXYSMAL ATRIAL FIBRILLATION: Primary | ICD-10-CM

## 2025-02-03 PROCEDURE — 99213 OFFICE O/P EST LOW 20 MIN: CPT | Performed by: PHYSICIAN ASSISTANT

## 2025-02-03 RX ORDER — MAGNESIUM 200 MG
200 TABLET ORAL DAILY
COMMUNITY

## 2025-02-03 RX ORDER — FLUVOXAMINE MALEATE 100 MG
200 TABLET ORAL NIGHTLY
COMMUNITY

## 2025-02-03 RX ORDER — GLUCOSAMINE/D3/BOSWELLIA SERRA 1500MG-400
TABLET ORAL DAILY
COMMUNITY

## 2025-02-03 NOTE — PROGRESS NOTES
Cardiac Electrophysiology Outpatient Follow Up Note            [facility]    Follow Up Office Visit      Sumi Pisano  [id number]      Primary Care Physician: Tyrel Mehta DO Subjective     Chief Complaint:   Diagnoses and all orders for this visit:    1. Paroxysmal atrial fibrillation (Primary)  -     ECG 12 Lead    2. Palpitations  -     ECG 12 Lead        Chief Complaint   Patient presents with    Paroxysmal atrial fibrillation       History of Present Illness:   Sumi Pisano is a 54 y.o. female who presents to my electrophysiology clinic for follow up of above complaints.  She was last seen in our office January 20, 2024. Since we last saw the pt, pt denies any clinical recurrence of  AF episodes, SOB, CP, LH, and dizziness. Denies any hospitalizations, ER visits, bleeding, or TIA/CVA symptoms.  She states that last visit she changed her metoprolol to 12.5 twice a day and this seemed to really help with occasional palpitation was had in the evening.        Problem List:   Chest pain   [facility] admission with chest pain, November 2020   LHC 11/20/20: Normal LV gram and normal coronaries   Echo 11/20/20: limited study with Lumason reveals NORMAL global and segmental wall motion and LVEF 67%  Paroxysmal atrial fibrillation   occurred in setting of use of Armor Thyroid for possible hypothyroidism in past.   Failed ECV x 2, Flecainide and Rythmol   Echo March 2020 with EF 60%   CHADSVASC= 1  S/p PVA June 2020, Dr. Lisa Floyd      Past Medical History:   Past Medical History:   Diagnosis Date    A-fib     Abnormal ECG 03/2020    Atrial fib with Rvr    Arrhythmia 03/2020    Atrial fib w/ RVR    Basal cell carcinoma 03/16/2020    Hyperlipidemia     Uterine leiomyoma 03/16/2020       Past Surgical History:   Past Surgical History:   Procedure Laterality Date    ABLATION OF DYSRHYTHMIC FOCUS  6/2/2020    CARDIAC CATHETERIZATION N/A 11/20/2020     Procedure: Left Heart Cath;  Surgeon: Sulaiman Mcdermott MD;  Location:  [facility];  Service: Cardiology;  Laterality: N/A;    CARDIAC ELECTROPHYSIOLOGY PROCEDURE N/A 6/2/2020    Procedure: PVA (paroxysmal), no meds to hold, Precision (SJM), ASAP  ;  Surgeon: Thad Gonzalez DO;  Location:  [facility];  Service: Cardiovascular;  Laterality: N/A;    MANDIBULAR AND MAXILLARY OSTEOTOMY  1993    FOR tmj     RHINOPLASTY  2004       Family History:   Family History   Problem Relation Age of Onset    Atrial fibrillation Mother     Heart failure Mother     Arrhythmia Mother         Atrial fib    Hypertension Mother     Anemia Mother         Treated with iron replacement-Feraheme    Atrial fibrillation Father     Arrhythmia Father         Atrial fib    Hyperlipidemia Father     Hypertension Father     Arrhythmia Paternal Aunt         atrial fib    Heart attack Paternal Uncle     Heart attack Paternal Uncle     Heart failure Paternal Aunt     Heart failure Paternal Aunt        Social History:   Social History     Socioeconomic History    Marital status:    Tobacco Use    Smoking status: Never     Passive exposure: Past    Smokeless tobacco: Never   Vaping Use    Vaping status: Never Used   Substance and Sexual Activity    Alcohol use: Yes     Alcohol/week: 1.0 standard drink of alcohol     Types: 1 Glasses of wine per week     Comment: ocass    Drug use: Never    Sexual activity: Yes     Partners: Male     Birth control/protection: None       Medications:     Current Outpatient Medications:     Biotin 64088 MCG tablet, Take  by mouth Daily., Disp: , Rfl:     doxepin (SINEquan) 10 MG capsule, Take 1 capsule by mouth Daily As Needed., Disp: , Rfl:     fluvoxaMINE (LUVOX) 100 MG tablet, Take 2 tablets by mouth Every Night., Disp: , Rfl:     hydrOXYzine pamoate (VISTARIL) 25 MG capsule, Take 1 capsule by mouth As Needed., Disp: , Rfl:     linaclotide (LINZESS) 145 MCG capsule capsule,  "Take 1 capsule by mouth Every Morning Before Breakfast., Disp: , Rfl:     Magnesium 200 MG tablet, Take 200 mg by mouth Daily., Disp: , Rfl:     metoprolol tartrate (LOPRESSOR) 25 MG tablet, TAKE 1 TABLET BY MOUTH EVERY DAY AT NIGHT (Patient taking differently: Take 0.5 tablets by mouth 2 (Two) Times a Day.), Disp: 90 tablet, Rfl: 3    multivitamin with minerals tablet tablet, Take 1 tablet by mouth Daily., Disp: , Rfl:     Tirzepatide-Weight Management (ZEPBOUND) 7.5 MG/0.5ML solution auto-injector, Inject 0.5 mL under the skin into the appropriate area as directed 1 (One) Time Per Week., Disp: , Rfl:     PROGESTERONE PO, Take 400 mg by mouth Daily. (Patient not taking: Reported on 2/3/2025), Disp: , Rfl:     Semaglutide 3 MG tablet, Take 2.5 mg by mouth Daily. (Patient not taking: Reported on 2/3/2025), Disp: , Rfl:     Allergies:   Allergies   Allergen Reactions    Penicillins Hives and Rash    Percocet [Oxycodone-Acetaminophen] Itching       Objective   Vital Signs:   Vitals:    02/03/25 0917   BP: 100/60   BP Location: Left arm   Patient Position: Sitting   Cuff Size: Adult   Pulse: 71   SpO2: 98%   Weight: 73 kg (161 lb)   Height: 175.3 cm (69\")         PHYSICAL EXAM  General appearance: Awake, alert, cooperative  Head: Normocephalic, without obvious abnormality, atraumatic  Eyes: Conjunctivae/corneas clear, EOMs intact  Neck: no adenopathy, no carotid bruit, no JVD, and thyroid: not enlarged  Lungs: clear to auscultation bilaterally and no rhonchi or crackles\", ' symmetric  Heart: regular rate and rhythm, S1, S2 normal, no murmur, click, rub or gallop  Abdomen: Soft, non-tender, bowel sounds normal,  no organomegaly  Extremities: extremities normal, atraumatic, no cyanosis or edema  Skin: Skin color, turgor normal, no rashes or lesions  Neurologic: Grossly normal     Lab Results   Component Value Date    GLUCOSE 114 (H) 02/18/2023    CALCIUM 9.4 02/18/2023     02/18/2023    K 3.4 (L) 02/18/2023    CO2 " "27.0 02/18/2023     02/18/2023    BUN 12 02/18/2023    CREATININE 0.98 02/18/2023    EGFRIFNONA 99 11/20/2020    BCR 12.2 02/18/2023    ANIONGAP 14.0 02/18/2023     Lab Results   Component Value Date    WBC 11.31 (H) 02/18/2023    HGB 13.9 02/18/2023    HCT 43.4 02/18/2023    MCV 85.1 02/18/2023     02/18/2023     No results found for: \"INR\", \"PROTIME\"  Lab Results   Component Value Date    TSH 3.880 02/18/2023       Cardiac Testing:     I personally viewed and interpreted the patient's EKG/Telemetry/lab data    Procedures    Tobacco Cessation: N/A  Obstructive Sleep Apnea Screening: Completed    Advance Care Planning   ACP discussion was declined by the patient. Patient does not have an advance directive, declines further assistance.       Assessment & Plan    Diagnoses and all orders for this visit:    1. Paroxysmal atrial fibrillation (Primary)  -     ECG 12 Lead    2. Palpitations  -     ECG 12 Lead           Diagnosis Plan   1. Paroxysmal atrial fibrillation  Ablated.  No recurrence.    It is now been almost 5 years since her ablation.    [id number] equals 1 due to female sex which in and of itself does not confer any incremental risk.       2. Recurrent Palpitations, Improved with BB, possible Atrial tachycardia. Well Mitigated.     Return follow-up in 1 year  Electronically signed by YEISON Canales, 02/03/25, 9:21 AM EST.   "

## 2025-02-14 ENCOUNTER — PATIENT MESSAGE (OUTPATIENT)
Dept: CARDIOLOGY | Facility: CLINIC | Age: 55
End: 2025-02-14
Payer: COMMERCIAL

## 2025-02-24 DIAGNOSIS — R00.2 PALPITATIONS: Primary | ICD-10-CM

## 2025-02-24 DIAGNOSIS — I48.0 PAROXYSMAL ATRIAL FIBRILLATION: ICD-10-CM

## 2025-02-24 NOTE — TELEPHONE ENCOUNTER
Patient came into clinic today with her mother who had an appointment. Order placed for 21 day event monitor and will be placed on patient today.

## (undated) DEVICE — PRESSURE MONITORING SET: Brand: TRUWAVE

## (undated) DEVICE — RETR ESOPH ESOSURE W/TEMP/CONTRL NITNL STY 27F

## (undated) DEVICE — ST EXT IV SMARTSITE 2VLV SP M LL 5ML IV1

## (undated) DEVICE — CATH DIAG EXPO M/ PK 6FR FL4/FR4 PIG 3PK

## (undated) DEVICE — CATH ULTRASND ECHO ACUNAV FOR ACUSON 8F 90CM

## (undated) DEVICE — GW J TP FIX CORE .035 150

## (undated) DEVICE — PINNACLE INTRODUCER SHEATH: Brand: PINNACLE

## (undated) DEVICE — ST INF PRI SMRTSTE 20DRP 2VLV 24ML 117

## (undated) DEVICE — PK CATH CARD 10

## (undated) DEVICE — DRSNG SURESITE123 4X4.8IN

## (undated) DEVICE — ST EXT IV LG BORE NDLESS FLTR LL 17IN

## (undated) DEVICE — KT MANIFOLD CATHLAB CUST

## (undated) DEVICE — TBG ABL COOL PT 2.6M 100042049

## (undated) DEVICE — Device

## (undated) DEVICE — SKIN PREP TRAY W/CHG: Brand: MEDLINE INDUSTRIES, INC.

## (undated) DEVICE — DECANT BG O JET

## (undated) DEVICE — INTRO SHEATH ENGAGE W/50 GW .038 9F12

## (undated) DEVICE — KT ELECTRD ENSITE PRECISION SURF

## (undated) DEVICE — INTRO SHEATH TRNSEP .032 SL0 8.5F 63CM

## (undated) DEVICE — SYRINGE,PISTON,IRRIGATION,60ML,STERILE: Brand: MEDLINE

## (undated) DEVICE — Device: Brand: MEDEX

## (undated) DEVICE — ADULT, W/LG. BACK PAD, RADIOTRANSPARENT ELEMENT AND LEAD WIRE: Brand: DEFIBRILLATION ELECTRODES

## (undated) DEVICE — GW TRNSEP SAFESEPT LT/ATRIAL RO 135CM .014IN

## (undated) DEVICE — STERILE (15.2 TAPERED TO 7.6 X 183CM) POLYETHYLENE ACCORDION-FOLDED COVER FOR USE WITH SIEMENS ACUNAV ULTRASOUND CATHETER FAMILY CONNECTOR: Brand: SWIFTLINK TRANSDUCER COVER

## (undated) DEVICE — INTRO STEER AGILIS NXT SM/CURL 8.5F

## (undated) DEVICE — SYS TRNSEP ACC BRK ACROSS A/ 18G 98CM

## (undated) DEVICE — DOME MONITORING W BONDED STPCK BIOTRANS2

## (undated) DEVICE — CATH ABL IRR BIDIR TACTICATH/SE DF/CRV 8F 2-2-2MM 3.5MM 115

## (undated) DEVICE — SYS TRNSEP ACC BRK ACROSS A/ 71CM

## (undated) DEVICE — SET PRIMARY GRVTY 10DP MALE LL 104IN

## (undated) DEVICE — PROB S-CATH TEMP ESOPH 10F

## (undated) DEVICE — LEX ELECTRO PHYSIOLOGY: Brand: MEDLINE INDUSTRIES, INC.

## (undated) DEVICE — KT MANIFLD EP